# Patient Record
Sex: FEMALE | Race: WHITE | NOT HISPANIC OR LATINO | Employment: FULL TIME | URBAN - METROPOLITAN AREA
[De-identification: names, ages, dates, MRNs, and addresses within clinical notes are randomized per-mention and may not be internally consistent; named-entity substitution may affect disease eponyms.]

---

## 2017-01-03 ENCOUNTER — APPOINTMENT (OUTPATIENT)
Dept: PHYSICAL THERAPY | Facility: MEDICAL CENTER | Age: 57
End: 2017-01-03
Payer: COMMERCIAL

## 2017-01-03 PROCEDURE — 97140 MANUAL THERAPY 1/> REGIONS: CPT

## 2017-01-03 PROCEDURE — 97110 THERAPEUTIC EXERCISES: CPT

## 2017-01-05 ENCOUNTER — APPOINTMENT (OUTPATIENT)
Dept: PHYSICAL THERAPY | Facility: MEDICAL CENTER | Age: 57
End: 2017-01-05
Payer: COMMERCIAL

## 2017-01-05 PROCEDURE — 97110 THERAPEUTIC EXERCISES: CPT

## 2017-01-05 PROCEDURE — 97140 MANUAL THERAPY 1/> REGIONS: CPT

## 2017-01-09 ENCOUNTER — APPOINTMENT (OUTPATIENT)
Dept: PHYSICAL THERAPY | Facility: MEDICAL CENTER | Age: 57
End: 2017-01-09
Payer: COMMERCIAL

## 2017-01-09 PROCEDURE — 97110 THERAPEUTIC EXERCISES: CPT

## 2017-01-09 PROCEDURE — 97140 MANUAL THERAPY 1/> REGIONS: CPT

## 2017-01-11 ENCOUNTER — APPOINTMENT (OUTPATIENT)
Dept: PHYSICAL THERAPY | Facility: MEDICAL CENTER | Age: 57
End: 2017-01-11
Payer: COMMERCIAL

## 2017-01-11 ENCOUNTER — GENERIC CONVERSION - ENCOUNTER (OUTPATIENT)
Dept: OTHER | Facility: OTHER | Age: 57
End: 2017-01-11

## 2017-01-11 PROCEDURE — 97140 MANUAL THERAPY 1/> REGIONS: CPT

## 2017-01-11 PROCEDURE — 97110 THERAPEUTIC EXERCISES: CPT

## 2017-01-13 ENCOUNTER — ALLSCRIPTS OFFICE VISIT (OUTPATIENT)
Dept: OTHER | Facility: OTHER | Age: 57
End: 2017-01-13

## 2017-01-16 ENCOUNTER — APPOINTMENT (OUTPATIENT)
Dept: PHYSICAL THERAPY | Facility: MEDICAL CENTER | Age: 57
End: 2017-01-16
Payer: COMMERCIAL

## 2017-01-16 PROCEDURE — 97140 MANUAL THERAPY 1/> REGIONS: CPT

## 2017-01-16 PROCEDURE — 97110 THERAPEUTIC EXERCISES: CPT

## 2017-01-16 PROCEDURE — 97010 HOT OR COLD PACKS THERAPY: CPT

## 2017-01-20 ENCOUNTER — APPOINTMENT (OUTPATIENT)
Dept: PHYSICAL THERAPY | Facility: MEDICAL CENTER | Age: 57
End: 2017-01-20
Payer: COMMERCIAL

## 2017-01-20 PROCEDURE — 97140 MANUAL THERAPY 1/> REGIONS: CPT

## 2017-01-20 PROCEDURE — 97110 THERAPEUTIC EXERCISES: CPT

## 2017-01-23 ENCOUNTER — APPOINTMENT (OUTPATIENT)
Dept: PHYSICAL THERAPY | Facility: MEDICAL CENTER | Age: 57
End: 2017-01-23
Payer: COMMERCIAL

## 2017-01-23 PROCEDURE — 97140 MANUAL THERAPY 1/> REGIONS: CPT

## 2017-01-23 PROCEDURE — 97110 THERAPEUTIC EXERCISES: CPT

## 2017-01-26 ENCOUNTER — APPOINTMENT (OUTPATIENT)
Dept: PHYSICAL THERAPY | Facility: MEDICAL CENTER | Age: 57
End: 2017-01-26
Payer: COMMERCIAL

## 2017-01-26 PROCEDURE — 97140 MANUAL THERAPY 1/> REGIONS: CPT

## 2017-01-26 PROCEDURE — 97110 THERAPEUTIC EXERCISES: CPT

## 2017-01-30 ENCOUNTER — APPOINTMENT (OUTPATIENT)
Dept: PHYSICAL THERAPY | Facility: MEDICAL CENTER | Age: 57
End: 2017-01-30
Payer: COMMERCIAL

## 2017-01-30 PROCEDURE — 97110 THERAPEUTIC EXERCISES: CPT

## 2017-02-06 ENCOUNTER — APPOINTMENT (OUTPATIENT)
Dept: PHYSICAL THERAPY | Facility: MEDICAL CENTER | Age: 57
End: 2017-02-06
Payer: COMMERCIAL

## 2017-02-06 PROCEDURE — 97110 THERAPEUTIC EXERCISES: CPT

## 2017-02-06 PROCEDURE — 97164 PT RE-EVAL EST PLAN CARE: CPT

## 2017-02-06 PROCEDURE — 97140 MANUAL THERAPY 1/> REGIONS: CPT

## 2017-02-07 ENCOUNTER — GENERIC CONVERSION - ENCOUNTER (OUTPATIENT)
Dept: OTHER | Facility: OTHER | Age: 57
End: 2017-02-07

## 2017-02-08 ENCOUNTER — ALLSCRIPTS OFFICE VISIT (OUTPATIENT)
Dept: OTHER | Facility: OTHER | Age: 57
End: 2017-02-08

## 2017-02-08 ENCOUNTER — APPOINTMENT (OUTPATIENT)
Dept: PHYSICAL THERAPY | Facility: MEDICAL CENTER | Age: 57
End: 2017-02-08
Payer: COMMERCIAL

## 2017-02-24 ENCOUNTER — ALLSCRIPTS OFFICE VISIT (OUTPATIENT)
Dept: OTHER | Facility: OTHER | Age: 57
End: 2017-02-24

## 2017-02-26 DIAGNOSIS — Z12.31 ENCOUNTER FOR SCREENING MAMMOGRAM FOR MALIGNANT NEOPLASM OF BREAST: ICD-10-CM

## 2017-03-09 ENCOUNTER — HOSPITAL ENCOUNTER (OUTPATIENT)
Dept: MAMMOGRAPHY | Facility: MEDICAL CENTER | Age: 57
Discharge: HOME | End: 2017-03-09
Payer: COMMERCIAL

## 2017-03-09 DIAGNOSIS — Z12.31 ENCOUNTER FOR SCREENING MAMMOGRAM FOR MALIGNANT NEOPLASM OF BREAST: ICD-10-CM

## 2017-03-09 PROCEDURE — G0202 SCR MAMMO BI INCL CAD: HCPCS

## 2017-03-09 PROCEDURE — 77063 BREAST TOMOSYNTHESIS BI: CPT

## 2017-04-24 ENCOUNTER — ALLSCRIPTS OFFICE VISIT (OUTPATIENT)
Dept: OTHER | Facility: OTHER | Age: 57
End: 2017-04-24

## 2017-04-24 ENCOUNTER — LAB REQUISITION (OUTPATIENT)
Dept: LAB | Facility: HOSPITAL | Age: 57
End: 2017-04-24
Payer: COMMERCIAL

## 2017-04-24 DIAGNOSIS — Z12.31 ENCOUNTER FOR SCREENING MAMMOGRAM FOR MALIGNANT NEOPLASM OF BREAST: ICD-10-CM

## 2017-04-24 DIAGNOSIS — Z12.4 ENCOUNTER FOR SCREENING FOR MALIGNANT NEOPLASM OF CERVIX: ICD-10-CM

## 2017-04-24 DIAGNOSIS — R10.2 PELVIC AND PERINEAL PAIN: ICD-10-CM

## 2017-04-24 PROCEDURE — G0145 SCR C/V CYTO,THINLAYER,RESCR: HCPCS | Performed by: OBSTETRICS & GYNECOLOGY

## 2017-04-27 LAB
LAB AP GYN PRIMARY INTERPRETATION: NORMAL
Lab: NORMAL

## 2017-05-01 ENCOUNTER — HOSPITAL ENCOUNTER (OUTPATIENT)
Dept: RADIOLOGY | Facility: HOSPITAL | Age: 57
Discharge: HOME | End: 2017-05-01
Attending: OBSTETRICS & GYNECOLOGY
Payer: COMMERCIAL

## 2017-05-01 DIAGNOSIS — R10.2 PELVIC AND PERINEAL PAIN: ICD-10-CM

## 2017-05-01 PROCEDURE — 76830 TRANSVAGINAL US NON-OB: CPT

## 2017-05-01 PROCEDURE — 76856 US EXAM PELVIC COMPLETE: CPT

## 2017-05-11 ENCOUNTER — ALLSCRIPTS OFFICE VISIT (OUTPATIENT)
Dept: OTHER | Facility: OTHER | Age: 57
End: 2017-05-11

## 2017-06-01 ENCOUNTER — ALLSCRIPTS OFFICE VISIT (OUTPATIENT)
Dept: OTHER | Facility: OTHER | Age: 57
End: 2017-06-01

## 2017-06-20 ENCOUNTER — APPOINTMENT (OUTPATIENT)
Dept: LAB | Facility: MEDICAL CENTER | Age: 57
End: 2017-06-20
Payer: COMMERCIAL

## 2017-06-20 ENCOUNTER — TRANSCRIBE ORDERS (OUTPATIENT)
Dept: ADMINISTRATIVE | Facility: HOSPITAL | Age: 57
End: 2017-06-20

## 2017-06-20 DIAGNOSIS — E78.5 HYPERLIPIDEMIA: ICD-10-CM

## 2017-06-20 DIAGNOSIS — I10 ESSENTIAL (PRIMARY) HYPERTENSION: ICD-10-CM

## 2017-06-20 DIAGNOSIS — E55.9 VITAMIN D DEFICIENCY: ICD-10-CM

## 2017-06-20 DIAGNOSIS — R73.09 OTHER ABNORMAL GLUCOSE: ICD-10-CM

## 2017-06-20 LAB
25(OH)D3 SERPL-MCNC: 45 NG/ML (ref 30–100)
ALBUMIN SERPL BCP-MCNC: 3.7 G/DL (ref 3.5–5)
ALP SERPL-CCNC: 87 U/L (ref 46–116)
ALT SERPL W P-5'-P-CCNC: 23 U/L (ref 12–78)
ANION GAP SERPL CALCULATED.3IONS-SCNC: 5 MMOL/L (ref 4–13)
AST SERPL W P-5'-P-CCNC: 14 U/L (ref 5–45)
BILIRUB SERPL-MCNC: 0.47 MG/DL (ref 0.2–1)
BUN SERPL-MCNC: 20 MG/DL (ref 5–25)
CALCIUM SERPL-MCNC: 8.8 MG/DL (ref 8.3–10.1)
CHLORIDE SERPL-SCNC: 106 MMOL/L (ref 100–108)
CHOLEST SERPL-MCNC: 163 MG/DL (ref 50–200)
CO2 SERPL-SCNC: 28 MMOL/L (ref 21–32)
CREAT SERPL-MCNC: 0.97 MG/DL (ref 0.6–1.3)
EST. AVERAGE GLUCOSE BLD GHB EST-MCNC: 117 MG/DL
GFR SERPL CREATININE-BSD FRML MDRD: 59.6 ML/MIN/1.73SQ M
GLUCOSE P FAST SERPL-MCNC: 96 MG/DL (ref 65–99)
HBA1C MFR BLD: 5.7 % (ref 4.2–6.3)
HDLC SERPL-MCNC: 43 MG/DL (ref 40–60)
LDLC SERPL CALC-MCNC: 105 MG/DL (ref 0–100)
POTASSIUM SERPL-SCNC: 3.9 MMOL/L (ref 3.5–5.3)
PROT SERPL-MCNC: 7.3 G/DL (ref 6.4–8.2)
SODIUM SERPL-SCNC: 139 MMOL/L (ref 136–145)
TRIGL SERPL-MCNC: 73 MG/DL

## 2017-06-20 PROCEDURE — 80061 LIPID PANEL: CPT

## 2017-06-20 PROCEDURE — 83036 HEMOGLOBIN GLYCOSYLATED A1C: CPT

## 2017-06-20 PROCEDURE — 36415 COLL VENOUS BLD VENIPUNCTURE: CPT

## 2017-06-20 PROCEDURE — 82306 VITAMIN D 25 HYDROXY: CPT

## 2017-06-20 PROCEDURE — 80053 COMPREHEN METABOLIC PANEL: CPT

## 2017-06-29 ENCOUNTER — ALLSCRIPTS OFFICE VISIT (OUTPATIENT)
Dept: OTHER | Facility: OTHER | Age: 57
End: 2017-06-29

## 2017-07-25 ENCOUNTER — APPOINTMENT (OUTPATIENT)
Dept: URGENT CARE | Facility: MEDICAL CENTER | Age: 57
End: 2017-07-25
Payer: COMMERCIAL

## 2017-07-25 ENCOUNTER — APPOINTMENT (OUTPATIENT)
Dept: RADIOLOGY | Facility: MEDICAL CENTER | Age: 57
End: 2017-07-25
Payer: COMMERCIAL

## 2017-07-25 ENCOUNTER — TRANSCRIBE ORDERS (OUTPATIENT)
Dept: URGENT CARE | Facility: MEDICAL CENTER | Age: 57
End: 2017-07-25

## 2017-07-25 DIAGNOSIS — S69.92XA HAND INJURY, LEFT, INITIAL ENCOUNTER: Primary | ICD-10-CM

## 2017-07-25 DIAGNOSIS — S69.92XA HAND INJURY, LEFT, INITIAL ENCOUNTER: ICD-10-CM

## 2017-07-25 PROCEDURE — S9088 SERVICES PROVIDED IN URGENT: HCPCS

## 2017-07-25 PROCEDURE — 99213 OFFICE O/P EST LOW 20 MIN: CPT

## 2017-07-25 PROCEDURE — 73130 X-RAY EXAM OF HAND: CPT

## 2017-08-14 ENCOUNTER — ALLSCRIPTS OFFICE VISIT (OUTPATIENT)
Dept: OTHER | Facility: OTHER | Age: 57
End: 2017-08-14

## 2017-11-13 ENCOUNTER — ALLSCRIPTS OFFICE VISIT (OUTPATIENT)
Dept: OTHER | Facility: OTHER | Age: 57
End: 2017-11-13

## 2017-12-21 ENCOUNTER — APPOINTMENT (OUTPATIENT)
Dept: LAB | Facility: MEDICAL CENTER | Age: 57
End: 2017-12-21
Payer: COMMERCIAL

## 2017-12-21 ENCOUNTER — TRANSCRIBE ORDERS (OUTPATIENT)
Dept: ADMINISTRATIVE | Facility: HOSPITAL | Age: 57
End: 2017-12-21

## 2017-12-21 DIAGNOSIS — R73.09 OTHER ABNORMAL GLUCOSE: Primary | ICD-10-CM

## 2017-12-21 DIAGNOSIS — E78.5 HYPERLIPIDEMIA, UNSPECIFIED HYPERLIPIDEMIA TYPE: ICD-10-CM

## 2017-12-21 DIAGNOSIS — I10 HYPERTENSION, UNSPECIFIED TYPE: ICD-10-CM

## 2017-12-21 DIAGNOSIS — R00.1 BRADYCARDIA: ICD-10-CM

## 2017-12-21 DIAGNOSIS — R73.09 OTHER ABNORMAL GLUCOSE: ICD-10-CM

## 2017-12-21 LAB
ALBUMIN SERPL BCP-MCNC: 3.6 G/DL (ref 3.5–5)
ALP SERPL-CCNC: 82 U/L (ref 46–116)
ALT SERPL W P-5'-P-CCNC: 18 U/L (ref 12–78)
ANION GAP SERPL CALCULATED.3IONS-SCNC: 4 MMOL/L (ref 4–13)
AST SERPL W P-5'-P-CCNC: 16 U/L (ref 5–45)
BASOPHILS # BLD AUTO: 0.07 THOUSANDS/ΜL (ref 0–0.1)
BASOPHILS NFR BLD AUTO: 1 % (ref 0–1)
BILIRUB SERPL-MCNC: 0.39 MG/DL (ref 0.2–1)
BUN SERPL-MCNC: 25 MG/DL (ref 5–25)
CALCIUM SERPL-MCNC: 8.8 MG/DL (ref 8.3–10.1)
CHLORIDE SERPL-SCNC: 108 MMOL/L (ref 100–108)
CHOLEST SERPL-MCNC: 147 MG/DL (ref 50–200)
CO2 SERPL-SCNC: 29 MMOL/L (ref 21–32)
CREAT SERPL-MCNC: 0.99 MG/DL (ref 0.6–1.3)
EOSINOPHIL # BLD AUTO: 0.15 THOUSAND/ΜL (ref 0–0.61)
EOSINOPHIL NFR BLD AUTO: 3 % (ref 0–6)
ERYTHROCYTE [DISTWIDTH] IN BLOOD BY AUTOMATED COUNT: 13.2 % (ref 11.6–15.1)
EST. AVERAGE GLUCOSE BLD GHB EST-MCNC: 111 MG/DL
GFR SERPL CREATININE-BSD FRML MDRD: 64 ML/MIN/1.73SQ M
GLUCOSE P FAST SERPL-MCNC: 83 MG/DL (ref 65–99)
HBA1C MFR BLD: 5.5 % (ref 4.2–6.3)
HCT VFR BLD AUTO: 39.5 % (ref 34.8–46.1)
HDLC SERPL-MCNC: 49 MG/DL (ref 40–60)
HGB BLD-MCNC: 12.9 G/DL (ref 11.5–15.4)
LDLC SERPL CALC-MCNC: 89 MG/DL (ref 0–100)
LYMPHOCYTES # BLD AUTO: 1.65 THOUSANDS/ΜL (ref 0.6–4.47)
LYMPHOCYTES NFR BLD AUTO: 29 % (ref 14–44)
MCH RBC QN AUTO: 30 PG (ref 26.8–34.3)
MCHC RBC AUTO-ENTMCNC: 32.7 G/DL (ref 31.4–37.4)
MCV RBC AUTO: 92 FL (ref 82–98)
MONOCYTES # BLD AUTO: 0.38 THOUSAND/ΜL (ref 0.17–1.22)
MONOCYTES NFR BLD AUTO: 7 % (ref 4–12)
NEUTROPHILS # BLD AUTO: 3.45 THOUSANDS/ΜL (ref 1.85–7.62)
NEUTS SEG NFR BLD AUTO: 60 % (ref 43–75)
NRBC BLD AUTO-RTO: 0 /100 WBCS
PLATELET # BLD AUTO: 309 THOUSANDS/UL (ref 149–390)
PMV BLD AUTO: 11 FL (ref 8.9–12.7)
POTASSIUM SERPL-SCNC: 4.3 MMOL/L (ref 3.5–5.3)
PROT SERPL-MCNC: 7.4 G/DL (ref 6.4–8.2)
RBC # BLD AUTO: 4.3 MILLION/UL (ref 3.81–5.12)
SODIUM SERPL-SCNC: 141 MMOL/L (ref 136–145)
TRIGL SERPL-MCNC: 46 MG/DL
TSH SERPL DL<=0.05 MIU/L-ACNC: 2.91 UIU/ML (ref 0.36–3.74)
WBC # BLD AUTO: 5.71 THOUSAND/UL (ref 4.31–10.16)

## 2017-12-21 PROCEDURE — 80053 COMPREHEN METABOLIC PANEL: CPT

## 2017-12-21 PROCEDURE — 84443 ASSAY THYROID STIM HORMONE: CPT

## 2017-12-21 PROCEDURE — 83036 HEMOGLOBIN GLYCOSYLATED A1C: CPT

## 2017-12-21 PROCEDURE — 85025 COMPLETE CBC W/AUTO DIFF WBC: CPT

## 2017-12-21 PROCEDURE — 80061 LIPID PANEL: CPT

## 2017-12-21 PROCEDURE — 36415 COLL VENOUS BLD VENIPUNCTURE: CPT

## 2018-01-04 ENCOUNTER — GENERIC CONVERSION - ENCOUNTER (OUTPATIENT)
Dept: OTHER | Facility: OTHER | Age: 58
End: 2018-01-04

## 2018-01-10 NOTE — PROGRESS NOTES
Assessment   1  Strain of tendon of right rotator cuff (840 4) (S46 011A)  2  Lateral epicondylitis of right elbow (726 32) (M77 11)1      1 Amended By: Karissa Boyle; Feb 02 2016 9:01 AM EST    Plan  Strain of tendon of right rotator cuff    · *1 - [facility] Physical Therapy  Consult  Status: Hold For - Scheduling   Requested for: 48THA7529  () Care Summary provided  : Yes    Discussion/Summary    Right shoulder rotator cuff strain, right elbow lateral epicondylitis  Referred to physical therapy for home exercise program   Continue meloxicam   Follow-up 5-6 weeks  Consider MRI if symptoms persist       Chief Complaint   1  Shoulder Pain    History of Present Illness  29-year-old right-hand-dominant female with right shoulder and elbow pain since 1/23/16 when she was shoveling snow  She initially had moderate pain in her right lateral elbow and has been icing that  History is notable for ECRB tendon debridement for lateral epicondylitis with Dr Reynaldo Olivera in 2007  She subsequently developed severe pain in her right shoulder with weakness and inability to raise her arm  The weakness has improved considerably but she continues to have pain localizing to the posterior and lateral aspect  No numbness or tingling reported  She takes meloxicam 15 mg daily  Symptoms are improving overall  The past medical history, past surgical history, medications, allergies, social history, and family history were reviewed and updated today  Review of Systems:  As stated in the history  The remaining systems were reviewed and are documented on the Patient Information and Medical History form in the chart  Active Problems   1  Abnormal blood sugar (790 29) (R73 09)  2  Allergic rhinitis (477 9) (J30 9)  3  Benign essential hypertension (401 1) (I10)  4  Hyperlipidemia (272 4) (E78 5)  5  Hypertension (401 9) (I10)  6  Insomnia (780 52) (G47 00)  7  Obesity (278 00) (E66 9)  8   Pain in both feet (729 5) (M79 671,M79 672)  9  Sinus bradycardia (427 89) (R00 1)  10  Vitamin D deficiency (268 9) (E55 9)    Past Medical History    · History of Abdominal pain (789 00) (R10 9)   · History of Abnormal blood chemistry (790 6) (R79 9)   · History of Acute sinusitis (461 9) (J01 90)   · History of Acute upper respiratory infection (465 9) (J06 9)   · History of Allergic Reaction (995 3)   · History of Allergic rhinitis (477 9) (J30 9)   · History of Arthralgia Of Left Shoulder Region (719 41)   · History of Diarrhea (787 91) (R19 7)   · History of Functional murmur (R01 0)   · 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)   · History of syncope (V15 89) (Z87 898)   · History of Lumbago (724 2) (M54 5)   · History of Obstruction of Eustachian tube, unspecified laterality (381 60) (H68 109)   · History of Otalgia, unspecified laterality (388 70) (H92 09)    Surgical History    · History of Appendectomy   · History of Cardiac Cath Procedure Summary   · History of Hysterectomy   · History of Pacemaker Placement    Family History    · Family history of Breast Cancer (V16 3)    · Family history of Cancer    · Family history of Allergic Bronchitis   · Family history of Diabetes Mellitus (V18 0)   · Family history of Epilepsy   · Family history of Heart Disease (V17 49)   · Family history of Hypertension (V17 49)   · Family history of Skin Cancer (V16 8)    Social History    · Denied: History of Alcohol Use (History)   · Living Independently With Spouse   · Never A Smoker   · Occupation:    Current Meds  1  Aspirin EC 81 MG Oral Tablet Delayed Release; Take 1 tablet daily; Last Rx:15Jun2015   Ordered  2  Azelastine HCl - 0 1 % Nasal Solution; USE 1 SPRAY IN EACH NOSTRIL TWICE DAILY; Therapy: 23Apr2014 to (Last Rx:21Apr2015)  Requested for: 21Apr2015 Ordered  3  EPINEPHrine 0 3 MG/0 3ML Injection Solution Auto-injector; use as directed;    Therapy: 52Ifv3031 to (Last Andrew Keen)  Requested for: 21Dec2015 Ordered  4  Fish Oil 1200 MG Oral Capsule; Take 1 capsule twice daily; Last Rx:15Jun2015 Ordered  5  Fluticasone Propionate 50 MCG/ACT Nasal Suspension; SUSP NA; Therapy: 62NNA0443 to (Evaluate:09Jun2016)  Requested for: 01NFS7752; Last   Rx:15Jun2015 Ordered  6  Meloxicam 7 5 MG Oral Tablet; TAKE 1 TABLET Twice daily PRN; Therapy: 09BEN0532 to (Evaluate:20Mar2016)  Requested for: 12Mac6632; Last   Rx:21Dec2015 Ordered  7  Red Yeast Rice 600 MG Oral Capsule; TAKE 4 CAPSULE Daily; Last Rx:15Jun2015   Ordered  8  Vitamin D2 2000 UNIT Oral Tablet; Take 1 tablet daily; Therapy: 11WWE6357 to Recorded    Allergies   1  Shellfish-derived Products  2  Darvon CAPS  3  Ultram TABS   4  Shellfish    Vitals   Recorded: 88GBP2966 08:38AM   Heart Rate 60   Systolic 239   Diastolic 84   Height 5 ft 7 in   Weight 248 lb    BMI Calculated 38 84   BSA Calculated 2 21     Physical Exam  Right shoulder: [No gross deformity]  [Nontender to palpation]  Range of motion is [symmetric bilaterally]  Neer impingement sign is positive  Fernandez impingement sign is positive  Empty can test is [negative]  Speed's test is [negative]  Emanuel's test is [negative]  Cross body adduction test is [negative]  [5]/5 strength forward flexion  Right elbow: [No] soft tissue swelling  Tenderness to palpation common extensor tendon near the lateral epicondyle  Well-healed surgical scar in this region without erythema  Range of motion is [symmetric bilaterally]  [Stable] with varus stress  [Stable] with valgus stress  Cardiovascular - Pulses: Normal    Skin - Skin and subcutaneous tissue: Normal    Psychiatric - Mood and affect: Normal       Future Appointments    Date/Time Provider Specialty Site   05/06/2016 09:30 AM Cardiology, [address]   02/08/2016 02:00 PM Cardiology, Device Remote  [facility]   05/11/2016 09:00 AM KASHIF Flores   Cardiology  CARDIOLOGY  [place]   06/23/2016 07:45 AM Marcos Bass DO Family Medicine [address]   04/18/2016 08:00 AM Jaswant Fairchild DO Obstetrics/Gynecology [facility]     Signatures   Electronically signed by : KASHIF Cee ; Feb 2 2016  9:01AM EST                       (Author)

## 2018-01-13 VITALS
HEIGHT: 67 IN | BODY MASS INDEX: 37.67 KG/M2 | DIASTOLIC BLOOD PRESSURE: 70 MMHG | SYSTOLIC BLOOD PRESSURE: 117 MMHG | WEIGHT: 240 LBS

## 2018-01-13 VITALS
HEIGHT: 67 IN | DIASTOLIC BLOOD PRESSURE: 78 MMHG | SYSTOLIC BLOOD PRESSURE: 126 MMHG | HEART RATE: 60 BPM | WEIGHT: 231.44 LBS | BODY MASS INDEX: 36.33 KG/M2

## 2018-01-13 VITALS
BODY MASS INDEX: 40.85 KG/M2 | DIASTOLIC BLOOD PRESSURE: 79 MMHG | WEIGHT: 260.25 LBS | SYSTOLIC BLOOD PRESSURE: 139 MMHG | HEART RATE: 66 BPM | HEIGHT: 67 IN

## 2018-01-13 NOTE — RESULT NOTES
Verified Results  * XR SHOULDER 2+ VIEW LEFT 31Bts7515 11:19AM Chandler Vu Order Number: [id number]     Test Name Result Flag Reference   XR SHOULDER 2+ VW LEFT (Report)     LEFT SHOULDER     INDICATION: Left shoulder pain  Lateral pain     COMPARISON: 12/22/2008     VIEWS: 3; 3 images     FINDINGS:   Left chest wall cardiac device  There is no acute fracture or dislocation  Small calcific density adjacent to the greater tuberosity may represent calcific tendinitis  No lytic or blastic lesions are seen  Soft tissues are unremarkable  IMPRESSION:     No acute osseous abnormality  Small calcific density adjacent to the greater tuberosity may represent calcific tendinitis         Workstation performed: [id number]     Signed by:   Wen Hedrick MD   7/12/16

## 2018-01-15 VITALS
WEIGHT: 246.25 LBS | HEIGHT: 67 IN | SYSTOLIC BLOOD PRESSURE: 115 MMHG | DIASTOLIC BLOOD PRESSURE: 76 MMHG | BODY MASS INDEX: 38.65 KG/M2 | HEART RATE: 60 BPM

## 2018-01-15 VITALS
HEIGHT: 67 IN | DIASTOLIC BLOOD PRESSURE: 82 MMHG | SYSTOLIC BLOOD PRESSURE: 128 MMHG | TEMPERATURE: 98.7 F | OXYGEN SATURATION: 98 % | HEART RATE: 55 BPM | WEIGHT: 226.44 LBS | RESPIRATION RATE: 16 BRPM | BODY MASS INDEX: 35.54 KG/M2

## 2018-01-24 VITALS
BODY MASS INDEX: 33.13 KG/M2 | TEMPERATURE: 97.6 F | OXYGEN SATURATION: 99 % | RESPIRATION RATE: 17 BRPM | DIASTOLIC BLOOD PRESSURE: 92 MMHG | HEART RATE: 60 BPM | WEIGHT: 211.56 LBS | SYSTOLIC BLOOD PRESSURE: 142 MMHG

## 2018-01-24 VITALS — SYSTOLIC BLOOD PRESSURE: 124 MMHG | DIASTOLIC BLOOD PRESSURE: 82 MMHG

## 2018-02-14 ENCOUNTER — CLINICAL SUPPORT (OUTPATIENT)
Dept: CARDIOLOGY CLINIC | Facility: CLINIC | Age: 58
End: 2018-02-14
Payer: COMMERCIAL

## 2018-02-14 DIAGNOSIS — Z95.0 CARDIAC PACEMAKER IN SITU: ICD-10-CM

## 2018-02-14 DIAGNOSIS — R55 SYNCOPE AND COLLAPSE: Primary | ICD-10-CM

## 2018-02-14 PROCEDURE — 93296 REM INTERROG EVL PM/IDS: CPT | Performed by: INTERNAL MEDICINE

## 2018-02-14 PROCEDURE — 93294 REM INTERROG EVL PM/LDLS PM: CPT | Performed by: INTERNAL MEDICINE

## 2018-02-15 NOTE — PROGRESS NOTES
CARELINK TRANSMISSION: BATTERY VOLTAGE ADEQUATE (31 MOS)  AP-34%, -0 8%  ALL AVAILABLE LEAD PARAMETERS WITHIN NORMAL LIMITS  NO SIGNIFICANT HIGH RATE EPISODES  155 RATE DROP RESPONSE EPISODES  NORMAL DEVICE FUNCTION   GV

## 2018-02-16 ENCOUNTER — EVALUATION (OUTPATIENT)
Dept: PHYSICAL THERAPY | Facility: MEDICAL CENTER | Age: 58
End: 2018-02-16
Payer: COMMERCIAL

## 2018-02-16 DIAGNOSIS — G56.22 CUBITAL TUNNEL SYNDROME ON LEFT: Primary | ICD-10-CM

## 2018-02-16 PROCEDURE — G8990 OTHER PT/OT CURRENT STATUS: HCPCS | Performed by: PHYSICAL THERAPIST

## 2018-02-16 PROCEDURE — 97161 PT EVAL LOW COMPLEX 20 MIN: CPT | Performed by: PHYSICAL THERAPIST

## 2018-02-16 PROCEDURE — G8991 OTHER PT/OT GOAL STATUS: HCPCS | Performed by: PHYSICAL THERAPIST

## 2018-02-16 NOTE — PROGRESS NOTES
PT Evaluation     Today's date: 2018  Patient name: Sheyla Reeder  : 1960  MRN: [id number]  Referring provider: Rahat Sumner PT  Dx:   Encounter Diagnosis   Name Primary?  Cubital tunnel syndrome on left Yes                  Assessment  Impairments: abnormal muscle firing and abnormal or restricted ROM    Assessment details: Sheyla Reeder is a 56 y o  female presents with Cubital tunnel syndrome on left  (primary encounter diagnosis)  Sheyla Reeder presents with the above listed impairments resulting in daily functional deficits and negative impact to quality of life  Skilled PT services appropriate to facilitate return to prior level of function with transition to home exercise program when appropriate  Understanding of Dx/Px/POC: good   Prognosis: good    Goals    - Increase streng th to 5/5 throughout scapular region  Impairment Goals  Resolve numbness in hand    Functional Goals  - Return to Prior Level of Function  - Return to patient specific functional goals identified at IE  No numbness in morning   - Patient will be independent with HEP    Plan  Patient would benefit from: skilled PT  Referral necessary: No  Other planned modality interventions: Modalities PRN  Planned therapy interventions: home exercise program, manual therapy, patient education, functional ROM exercises, strengthening, stretching and joint mobilization  Frequency: 2x week  Duration in weeks: 6  Treatment plan discussed with: patient        Subjective Evaluation    History of Present Illness  Date of onset: 2018  Mechanism of injury: Sheyla Reeder is a 56 y o female presenting to therapy with complaints of left hand numbness for a few days  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 who believes it is cubital tunnel  She notes intermittent events of numbness over past few months however since Wednesday it has been constant    She has attempted OTC brace however is not able to wear due to discomfort  Numbness is 5th digit and lateral side of 4th digit  She is present for DA evaluation  Quality of life: excellent    Pain  Current pain ratin  At best pain ratin  At worst pain ratin  Location: Numbness, not pain   Progression: worsening      Diagnostic Tests  No diagnostic tests performed  Treatments  No previous or current treatments  Current treatment: massage  Patient Goals  Patient goal: Resolve numbness        Objective     Tests     Additional Tests Details  + tinnel to cubital tunnel    (-) guyons canal compression    ULTT (-) for all neural biases     General Comments     Cervical/Thoracic Comments  Cervical spine testing unable to reproduce symptoms          Precautions: Pacemaker    Daily Treatment Diary     Manual                                                                                   Exercise Diary                                                                                                                                                                                                                                                                                      Modalities

## 2018-02-20 ENCOUNTER — OFFICE VISIT (OUTPATIENT)
Dept: PHYSICAL THERAPY | Facility: MEDICAL CENTER | Age: 58
End: 2018-02-20
Payer: COMMERCIAL

## 2018-02-20 DIAGNOSIS — G56.22 CUBITAL TUNNEL SYNDROME ON LEFT: Primary | ICD-10-CM

## 2018-02-20 PROCEDURE — 97110 THERAPEUTIC EXERCISES: CPT | Performed by: PHYSICAL THERAPIST

## 2018-02-21 NOTE — PROGRESS NOTES
Daily Note     Today's date: 2018  Patient name: Pedro Lucio  : 1960  MRN: [id number]  Referring provider: Tony Lobo, [place]  Dx:   Encounter Diagnosis     ICD-10-CM    1  Cubital tunnel syndrome on left G56 22                   Subjective: Patient reports no change from IE       Objective: See treatment diary below    Precautions: Pacemaker    Daily Treatment Diary     Manual             Radial-ulnar gliding  AF           Humero-ulnar distraction  AF           Cervical side glides  AF           Carpal mobs  AF                            Exercise Diary                                                                                                                                                                                                                                                                                      Modalities              MHP pre manuals  10'                                         Assessment: Tolerated treatment well  Patient would benefit from continued PT  Patient to have brace made to manage overnight  Plan: Continue per plan of care

## 2018-03-07 NOTE — PROGRESS NOTES
"  Discussion/Summary  Normal device function     Not clearly afib  atrial high rate episodes brief  Results/Data  Cardiac Device Remote [id number] 10:10PM Terrie Self     Test Name Result Flag Reference   MISCELLANEOUS COMMENT (Report)     CARELINK TRANSMISSION: BATTERY VOLTAGE ADEQUATE (3 5 YRS)  AP-16%, -0 7%  8 NEW AHR EPISODES LONGEST 12 SEC  ST W/ FARFIELD OVERSENSING ON EGM'S  HX OF SAME  WILL NEED TO REPROGRAM SENSITIVITY @ NEXT INTERRO  40 NEW RATE DROP RESPONSE EPISODES  ALL AVAILABLE LEAD PARAMETERS WITHIN NORMAL LIMITS  NORMAL DEVICE FUNCTION  GV   Cardiac Electrophysiology Report      [id number]  pdf   DEVICE TYPE Pacemaker       Cardiac Electrophysiology Report [id number] 10:10PM Terrie Self     Test Name Result Flag Reference   Cardiac Electrophysiology Report      [id number]  pdf     Signatures   Electronically signed by : Curly Rust RN; Feb 9 2017  4:14PM EST                       (Author)    Electronically signed by : Sarbjit Huntley DO; Feb 19 2017  6:00PM EST                       (Author)    "

## 2018-03-07 NOTE — PROGRESS NOTES
"  Discussion/Summary  Normal device function     Rate drop response noted  Results/Data  Cardiac Device Remote [id number] 02:48AM Lorean Fought     Test Name Result Flag Reference   MISCELLANEOUS COMMENT      CARELINK TRANSMISSION: BATTERY VOLTAGE ADEQUATE (~232 MOS)  AP-34%, -1%  ALL AVAILABLE LEAD PARAMETERS APPEAR WITHIN NORMAL LIMITS & STABLE  NO SIGNIFICANT HIGH RATE EPISODES  111 RATE DROP RESPONSE EPISODES  PACEMAKER FUNCTIONING APPROPRIATELY  eb   Cardiac Electrophysiology Report      [id number]  pdf   DEVICE TYPE Pacemaker       Cardiac Electrophysiology Report [id number] 02:48AM Loreabubba Fodarryl     Test Name Result Flag Reference   Cardiac Electrophysiology Report      [id number]  pdf     Signatures   Electronically signed by : Tej Burnett, ; Nov 13 2017 12:54PM EST                       (Author)    Electronically signed by : KASHIF Fermin ; Nov 14 2017  9:51AM EST                       (Author)    "

## 2018-03-07 NOTE — PROGRESS NOTES
"  Discussion/Summary  Normal device function      Results/Data  Cardiac Device Remote [id number] 07:09PM Kira Rodriguez     Test Name Result Flag Reference   MISCELLANEOUS COMMENT      CARELINK TRANSMISSION: BATTERY VOLTAGE ADEQUATE (3 YRS)  AP-34%, -0 7%  ALL AVAILABLE LEAD PARAMETERS WITHIN NORMAL LIMITS  NO SIGNIFICANT HIGH RATE EPISODES  50 RATE DROP RESPONSE EPISODES  NORMAL DEVICE FUNCTION  GV   Cardiac Electrophysiology Report      [id number]  pdf   DEVICE TYPE Pacemaker       Cardiac Electrophysiology Report [id number] 07:09PM Kira Rodriguez     Test Name Result Flag Reference   Cardiac Electrophysiology Report      [id number]  pdf     Signatures   Electronically signed by : Sandy Mejia RN; Aug 15 2017  4:29PM EST                       (Author)    Electronically signed by : William Aguilar DO; Aug 15 2017  6:42PM EST                       (Author)    "

## 2018-03-07 NOTE — PROGRESS NOTES
"  Discussion/Summary  Normal device function     Brief episodes of AHR  no egm , only markers      atrial lead is set up bipolar, hence unlikely far field sensing   follow closely  Results/Data  Cardiac Device Remote [id number] 04:01PM Arty Cutter     Test Name Result Flag Reference   MISCELLANEOUS COMMENT (Report)     CARELINK TRANSMISSION: BATTERY VOLTAGE ADEQUATE (3 5 YRS)  AP-19%, -0 7%  14 NEW AHR EPISODES- ST W/ FARFIELD OVERSENSING ON AVAILABLE EGM'S W/ HX OF SAME  78 RATE DROP RESPONSE EPISODES SINCE 5/6/16  ALL AVAILABLE LEAD PARAMETERS WITHIN NORMAL LIMITS  NORMAL DEVICE FUNCTION  GV   Cardiac Electrophysiology Report      [id number]  pdf   DEVICE TYPE Pacemaker       Cardiac Electrophysiology Report [id number] 04:01PM Arty Cutter     Test Name Result Flag Reference   Cardiac Electrophysiology Report      [id number]  pdf     Signatures   Electronically signed by : Alexa Westbrook RN; Nov 14 2016  4:37PM EST                       (Author)    Electronically signed by : KASHIF Stevenson ; Nov 23 2016 10:48PM EST                       (Author)    "

## 2018-03-07 NOTE — PROGRESS NOTES
"  Discussion/Summary  Normal device function      Results/Data  Results   Cardiac Device Remote [id number] 09:39AM Paula Garces     Test Name Result Flag Reference   MISCELLANEOUS COMMENT (Report)     CARELINK TRANSMISSION: BATTERY VOLTAGE ADEQUATE (4 YRS); AP = 15%,  = 1%; 4 DEVICE CLASSIFIED AHR EPISODES - ALL <1 MIN DURATION - HX OF SAME; PT TAKES ASA 81 MG; MULTI RATE DROP EPISODES NOTED - HX OF SAME; ALL LEAD PARAMETERS APPEAR WITHIN NORMAL LIMITS/STABLE; NO OTHER TESTING AVAILABLE DUE TO REMOTE TRANSMISSION; NORMAL DEVICE FUNCTION  eb   Cardiac Electrophysiology Report      [id number]}  pdf   DEVICE TYPE Pacemaker       Cardiac Electrophysiology Report [id number] 09:39AM Paula Garces     Test Name Result Flag Reference   Cardiac Electrophysiology Report      [id number]  pdf     Signatures   Electronically signed by : Lisy Abernathy, ; Feb 8 2016 12:17PM EST                       (Author)    Electronically signed by : Nilo Hodge DO; Feb 14 2016  7:45AM EST                       (Author)    "

## 2018-03-07 NOTE — PROGRESS NOTES
"  Discussion/Summary  Normal device function      Results/Data  Results   Cardiac Device In Clinic [id number] 01:17PM Blossom Ege     Test Name Result Flag Reference   MISCELLANEOUS COMMENT (Report)     DEVICE INTERROGATED IN THE [place] OFFICE  BATTERY VOLTAGE ADEQUATE (4 YRS)  AP 13 5%  0 8%  ALL AVAILABLE LEAD PARAMETERS WITHIN NORMAL LIMITS  MULT AHR EPISODES UP TO 8 SEC = FAR-FIELD OVERSENSING  MULT RATE DROP EPISODES WITH HX OF SAME  NO EGRAMS AVAILABLE  PT ASYMPTOMATIC  INCREASED PVAB FROM 180-200 MS  PACEMAKER FUNCTIONING APPROPRIATELY  CP   Cardiac Electrophysiology Report      [id number] [id number] [id number]_16_1  pdf   DEVICE TYPE Pacemaker       Cardiac Electrophysiology Report [id number] 01:17PM Blossom Ege     Test Name Result Flag Reference   Cardiac Electrophysiology Report      [id number]  pdf     Signatures   Electronically signed by : Ryan Taylor, ; May  6 2016 10:07AM EST                       (Author)    Electronically signed by : Vladimir Leonard DO; May  8 2016 11:41AM EST                       (Author)    "

## 2018-03-07 NOTE — PROGRESS NOTES
"  Discussion/Summary  Normal device function      Results/Data  Cardiac Device In Clinic [id number] 06:48PM Ilichris Tomlinson     Test Name Result Flag Reference   MISCELLANEOUS COMMENT (Report)     DEVICE INTERROGATED IN THE [place] OFFICE  J5IhKNIHMD VOLTAGE ADEQUATE (3YRS)  AP-20%, -1%  ALL LEAD PARAMETERS TEST WITHIN NORMAL LIMITS WITH SM  RISE IN RV CAPTURE THRESHOLD  INCREASE MADE TO RV AMPLITUDE TO PROVIDE ADEQUATE SAFETY MARGIN  6 NEW AHR EPISODES WITH DURATION ALL <1 MIN FOR ST W/ FARFIELD OS  RA SENSING INCREASED  168 RATE DROP RESPONSE EPISODES ALSO NOTED  NORMAL DEVICE FUNCTION  eb   Cardiac Electrophysiology Report      [id number] [id number] [id number]  pdf   DEVICE TYPE Pacemaker       Cardiac Electrophysiology Report [id number] 06:48PM Trista Tomlinson     Test Name Result Flag Reference   Cardiac Electrophysiology Report      [id number]  pdf     Signatures   Electronically signed by : Glenys Jama, ; May 15 2017  9:28AM EST                       (Author)    Electronically signed by : Morenita Uriarte DO; May 17 2017 12:46PM EST                       (Author)    "

## 2018-03-07 NOTE — PROGRESS NOTES
"  Discussion/Summary  Normal device function      Results/Data  Cardiac Device Remote [id number] 12:11PM Rbeecca Alphonso     Test Name Result Flag Reference   MISCELLANEOUS COMMENT (Report)     CARELINK TRANSMISSION: BATTERY VOLTAGE ADEQUATE (3 5 YRS); AP = 18%,  = 1%; (1) DEVICE CLASSIFIED AHR EPISODES - DURATION @ 2 SECS - APPEARS TO BE FAR FIELD OVERSENSING (NOT AF); (45) RATE DROP EPISODES ALSO NOTED (NO EGRMS STORED); PT WITH HX OF SAME; ALL AVAILABLE LEAD PARAMETERS APPEAR WITHIN NORMAL LIMITS/STABLE; NORMAL DEVICE FUNCTION  eb   Cardiac Electrophysiology Report      [id number]}  pdf   DEVICE TYPE Pacemaker       Cardiac Electrophysiology Report [id number] 12:11PM Rebecca Laphonso     Test Name Result Flag Reference   Cardiac Electrophysiology Report      [id number] pdf     Signatures   Electronically signed by : Torin Padilla, ; Aug  8 2016  9:22AM EST                       (Author)    Electronically signed by : Eze Nelson DO; Aug 10 2016  1:49PM EST                       (Author)    "

## 2018-03-12 ENCOUNTER — HOSPITAL ENCOUNTER (OUTPATIENT)
Dept: MAMMOGRAPHY | Facility: MEDICAL CENTER | Age: 58
Discharge: HOME | End: 2018-03-12
Payer: COMMERCIAL

## 2018-03-12 DIAGNOSIS — Z12.31 ENCOUNTER FOR SCREENING MAMMOGRAM FOR MALIGNANT NEOPLASM OF BREAST: ICD-10-CM

## 2018-03-12 PROCEDURE — 77063 BREAST TOMOSYNTHESIS BI: CPT

## 2018-03-12 PROCEDURE — 77067 SCR MAMMO BI INCL CAD: CPT

## 2018-04-17 DIAGNOSIS — R20.2 LEFT HAND PARESTHESIA: ICD-10-CM

## 2018-04-17 DIAGNOSIS — R20.2 PARESTHESIA OF THUMB OF LEFT HAND: Primary | ICD-10-CM

## 2018-04-18 ENCOUNTER — OFFICE VISIT (OUTPATIENT)
Dept: CARDIOLOGY CLINIC | Facility: CLINIC | Age: 58
End: 2018-04-18
Payer: COMMERCIAL

## 2018-04-18 VITALS — WEIGHT: 206 LBS | HEIGHT: 67 IN | BODY MASS INDEX: 32.33 KG/M2

## 2018-04-18 DIAGNOSIS — E78.2 MIXED HYPERLIPIDEMIA: ICD-10-CM

## 2018-04-18 DIAGNOSIS — I10 HYPERTENSION, UNSPECIFIED TYPE: Primary | ICD-10-CM

## 2018-04-18 DIAGNOSIS — R55 SYNCOPE, UNSPECIFIED SYNCOPE TYPE: ICD-10-CM

## 2018-04-18 DIAGNOSIS — R00.1 SINUS BRADYCARDIA: ICD-10-CM

## 2018-04-18 PROCEDURE — 93000 ELECTROCARDIOGRAM COMPLETE: CPT | Performed by: INTERNAL MEDICINE

## 2018-04-18 PROCEDURE — 99214 OFFICE O/P EST MOD 30 MIN: CPT | Performed by: INTERNAL MEDICINE

## 2018-04-18 NOTE — PROGRESS NOTES
Cardiology Follow Up    Yajaira [facility]  1960  [id number]  [address]  [address] [address]    1  Hypertension, unspecified type  POCT ECG   2  Mixed hyperlipidemia     3  Sinus bradycardia     4  Syncope, unspecified syncope type         Interval History:  Of overall she feels well  She tolerates all activity  She has had no further syncope  She has had significant weight loss with that notices herself to be at times a little bit orthostatic  She denies chest pain orthopnea paroxysmal nocturnal dyspnea or syncope  Patient Active Problem List   Diagnosis    Hypertension    Mixed hyperlipidemia    Sinus bradycardia    Syncope     Past Medical History:   Diagnosis Date    Abnormal blood chemistry     last assessed: 6/9/2014    Allergic reaction     last assessed: 9/17/2012    Allergic rhinitis     last assessed: 6/9/2014    Arthralgia of left shoulder region     last assessed: 12/3/2013    Arthritis     Dysautonomia     Elevated blood pressure reading without diagnosis of hypertension     last assessed: 9/30/2016    Familial combined hyperlipidemia     last assessed: 6/9/2014    Functional murmur     description: soft systolic murmur - no valvular disease on 2D echo Last assessed: 4/23/2014    Hypertension     Obstruction of eustachian tube     unspecified laterality Last assessed: 3/17/2014    PONV (postoperative nausea and vomiting)     Symptomatic bradycardia     Syncope     Wears glasses      Social History     Social History    Marital status: /Civil Union     Spouse name: N/A    Number of children: 3    Years of education: N/A     Occupational History    Not on file       Social History Main Topics    Smoking status: Never Smoker    Smokeless tobacco: Not on file    Alcohol use Yes      Comment: 1-2 x per year Per allscripts - no alcohol use     Drug use: No    Sexual activity: Not on file     Other Topics Concern    Not on file     Social History Narrative    Living independently with spouse    Caffeine use       Family History   Problem Relation Age of Onset    Breast cancer Mother     Cancer Father     Allergies Family      bronchitis    Diabetes Family     Seizures Family     Heart disease Family     Hypertension Family     Skin cancer Family      Past Surgical History:   Procedure Laterality Date    APPENDECTOMY      CARDIAC CATHETERIZATION      Description: Normal EF, No obstructive coronary artery disease, systolic hypertension  Zachariah Solis done at [facility] Resolved: 1998    CARDIAC PACEMAKER PLACEMENT Left      SECTION      COLONOSCOPY      DEBRIDEMENT TENNIS ELBOW      GANGLION CYST EXCISION Left     hand    HYSTERECTOMY      KNEE ARTHROSCOPY Right     WA SHLDR ARTHROSCOP,SURG,W/ROTAT CUFF REPR Left 10/3/2016    Procedure: ARTHROSCOPY SHOULDER, ROTATOR CUFF REPAIR,SUBACROMIAL DECOMPRESSION ; MICROFRACTURE MIKAELA GLENOID HEAD;  Surgeon: Anita Stapleton MD;  Location: [facility];  Service: Orthopedics    TONSILLECTOMY      WISDOM TOOTH EXTRACTION         Current Outpatient Prescriptions:     aspirin 81 MG tablet, Take 81 mg by mouth daily, Disp: , Rfl:     azelastine (ASTELIN) 0 1 % nasal spray, 2 sprays into each nostril daily Use in each nostril as directed, Disp: , Rfl:     Cholecalciferol (VITAMIN D-3 PO), Take 4,000 Units by mouth daily, Disp: , Rfl:     EPINEPHrine (EPIPEN) 0 3 mg/0 3 mL SOAJ, Inject 0 3 mg into the shoulder, thigh, or buttocks as needed for anaphylaxis, Disp: , Rfl:     FLUTICASONE PROPIONATE, NASAL, NA, 2 sprays into each nostril daily, Disp: , Rfl:     loratadine (CLARITIN) 10 mg tablet, Take 10 mg by mouth daily, Disp: , Rfl:     Omega-3 Fatty Acids (FISH OIL PO), Take 2,000 mg by mouth 2 (two) times a day, Disp: , Rfl:     Red Yeast Rice Extract (RED YEAST RICE PO), Take 1,200 mg by mouth 2 (two) times a day, Disp: , Rfl: Allergies   Allergen Reactions    Shellfish Allergy Anaphylaxis    Shellfish-Derived Products Anaphylaxis    Shrimp Flavor Anaphylaxis    Darvon [Propoxyphene] Hives    Tramadol Hives    Ultram [Tramadol Hcl] Hives       Labs:  Appointment on 12/21/2017   Component Date Value    WBC 12/21/2017 5 71     RBC 12/21/2017 4 30     Hemoglobin 12/21/2017 12 9     Hematocrit 12/21/2017 39 5     MCV 12/21/2017 92     MCH 12/21/2017 30 0     MCHC 12/21/2017 32 7     RDW 12/21/2017 13 2     MPV 12/21/2017 11 0     Platelets 35/68/1428 309     nRBC 12/21/2017 0     Neutrophils Relative 12/21/2017 60     Lymphocytes Relative 12/21/2017 29     Monocytes Relative 12/21/2017 7     Eosinophils Relative 12/21/2017 3     Basophils Relative 12/21/2017 1     Neutrophils Absolute 12/21/2017 3 45     Lymphocytes Absolute 12/21/2017 1 65     Monocytes Absolute 12/21/2017 0 38     Eosinophils Absolute 12/21/2017 0 15     Basophils Absolute 12/21/2017 0 07     Sodium 12/21/2017 141     Potassium 12/21/2017 4 3     Chloride 12/21/2017 108     CO2 12/21/2017 29     Anion Gap 12/21/2017 4     BUN 12/21/2017 25     Creatinine 12/21/2017 0 99     Glucose, Fasting 12/21/2017 83     Calcium 12/21/2017 8 8     AST 12/21/2017 16     ALT 12/21/2017 18     Alkaline Phosphatase 12/21/2017 82     Total Protein 12/21/2017 7 4     Albumin 12/21/2017 3 6     Total Bilirubin 12/21/2017 0 39     eGFR 12/21/2017 64     Hemoglobin A1C 12/21/2017 5 5     EAG 12/21/2017 111     TSH 3RD GENERATON 12/21/2017 2 910     Cholesterol 12/21/2017 147     Triglycerides 12/21/2017 46     HDL, Direct 12/21/2017 49     LDL Calculated 12/21/2017 89      Imaging: No results found  Review of Systems:  Review of Systems   Constitutional: Negative for fatigue  HENT: Negative for hearing loss  Eyes: Negative for discharge  Respiratory: Negative for shortness of breath      Cardiovascular: Positive for leg swelling  Negative for chest pain and palpitations  Gastrointestinal: Negative for abdominal pain  Endocrine: Negative for polyuria  Genitourinary: Negative for dysuria  Musculoskeletal: Negative for back pain and myalgias  Skin: Negative for rash  Neurological: Negative for syncope  Hematological: Does not bruise/bleed easily  Psychiatric/Behavioral: Negative for confusion and sleep disturbance  Physical Exam:  Physical Exam   Constitutional: She is oriented to person, place, and time  She appears well-developed and well-nourished  HENT:   Head: Normocephalic and atraumatic  Mouth/Throat: Oropharynx is clear and moist    Eyes: EOM are normal    Neck: Normal range of motion  Neck supple  No JVD present  Cardiovascular: Normal rate, regular rhythm, normal heart sounds and intact distal pulses  Pulmonary/Chest: Effort normal and breath sounds normal    Abdominal: Soft  Bowel sounds are normal    Musculoskeletal: Normal range of motion  Neurological: She is alert and oriented to person, place, and time  Skin: Skin is warm and dry  Psychiatric: She has a normal mood and affect  Her behavior is normal  Judgment and thought content normal    Nursing note and vitals reviewed  Discussion/Summary:  Of asymptomatic in functional class 1  She has a history of a pacemaker secondary to syncope that has not recurred since pacemaker was placed  Recent assessment of pacemaker shows it to be functioning appropriately  Twelve lead EKG today shows sinus bradycardia at a rate of 57 with first-degree AV block  Blood pressure is currently controlled  Most recent lipid profile shows an LDL less than 100  She takes red yeast rice  She takes an aspirin tablet daily    I have made no changes I will see her again in 1 year

## 2018-04-19 ENCOUNTER — HOSPITAL ENCOUNTER (OUTPATIENT)
Dept: NEUROLOGY | Facility: AMBULATORY SURGERY CENTER | Age: 58
Discharge: HOME | End: 2018-04-19
Payer: COMMERCIAL

## 2018-04-19 DIAGNOSIS — R20.2 LEFT HAND PARESTHESIA: ICD-10-CM

## 2018-04-19 DIAGNOSIS — R20.2 ARM PARESTHESIA, LEFT: Primary | ICD-10-CM

## 2018-04-19 PROCEDURE — 95886 MUSC TEST DONE W/N TEST COMP: CPT | Performed by: PSYCHIATRY & NEUROLOGY

## 2018-04-19 PROCEDURE — 95909 NRV CNDJ TST 5-6 STUDIES: CPT | Performed by: PSYCHIATRY & NEUROLOGY

## 2018-04-20 ENCOUNTER — HOSPITAL ENCOUNTER (OUTPATIENT)
Dept: RADIOLOGY | Facility: HOSPITAL | Age: 58
Discharge: HOME | End: 2018-04-20
Payer: COMMERCIAL

## 2018-04-20 DIAGNOSIS — R20.2 ARM PARESTHESIA, LEFT: ICD-10-CM

## 2018-04-20 PROCEDURE — 72125 CT NECK SPINE W/O DYE: CPT

## 2018-04-23 ENCOUNTER — EVALUATION (OUTPATIENT)
Dept: PHYSICAL THERAPY | Facility: MEDICAL CENTER | Age: 58
End: 2018-04-23
Payer: COMMERCIAL

## 2018-04-23 DIAGNOSIS — M54.12 CERVICAL RADICULOPATHY: Primary | ICD-10-CM

## 2018-04-23 PROCEDURE — G8990 OTHER PT/OT CURRENT STATUS: HCPCS | Performed by: PHYSICAL THERAPIST

## 2018-04-23 PROCEDURE — G8991 OTHER PT/OT GOAL STATUS: HCPCS | Performed by: PHYSICAL THERAPIST

## 2018-04-23 PROCEDURE — 97161 PT EVAL LOW COMPLEX 20 MIN: CPT | Performed by: PHYSICAL THERAPIST

## 2018-04-23 NOTE — PROGRESS NOTES
PT Evaluation     Today's date: 2018  Patient name: Andie Madrid  : 1960  MRN: [id number]  Referring provider: Latoya Alanis, PT  Dx:   Encounter Diagnosis     ICD-10-CM    1  Cervical radiculopathy M54 12                   Assessment  Impairments: abnormal muscle firing, abnormal muscle tone, abnormal or restricted ROM, impaired physical strength and pain with function    Assessment details: Andie Madrid is a 56 y o  female presents with Cervical radiculopathy  (primary encounter diagnosis)  Andie Madrid presents with the above listed impairments resulting in daily functional deficits and negative impact to quality of life  Skilled PT services appropriate to facilitate return to prior level of function with transition to home exercise program when appropriate  Understanding of Dx/Px/POC: good   Prognosis: good    Goals  Impairment Goals  Resolve numbness in hand      Functional Goals  - Return to Prior Level of Function  -  - Patient will be independent with Hient specific functional goals identified at IE  - Increase Functional Status Measure to: above FOTO predicted    Plan  Patient would benefit from: skilled PT  Referral necessary: No  Planned therapy interventions: home exercise program, manual therapy, neuromuscular re-education, patient education, functional ROM exercises, strengthening, stretching, joint mobilization, graded activity and graded exercise  Frequency: 2x week  Duration in weeks: 12  Treatment plan discussed with: patient        Subjective Evaluation    History of Present Illness  Mechanism of injury: Hola Martinez presents to therapy with complaints of L hand numbness in 4th and 5th digits  Numbness is constantly present and is unchanged by position  She underwent EMG and most significant neural compromise was present in the cervical spine without presence of distal compression    She is here for direct access evaluation and to begin treatment while she awaits to hear from her PCP regarding results  Goals for therapy are to resolve numbness  Pain  Current pain ratin  At best pain ratin  At worst pain ratin      Diagnostic Tests  EMG: abnormal  Treatments  No previous or current treatments  Patient Goals  Patient goal: Resolve numbness         Objective     Special Questions  Negative for night pain, disturbed sleep, dizziness, trouble swallowing and history of trauma    Neurological Testing     Sensation   Cervical/Thoracic   Left   Intact: light touch    Right   Intact: light touch    Comments   Left light touch: with exceptoin of C8 dermatome  Reflexes   Left   Biceps (C5/C6): normal (2+)  Triceps (C7): normal (2+)  Holden's reflex: negative    Right   Biceps (C5/C6): normal (2+)  Triceps (C7): normal (2+)  Holden's reflex: negative    Active Range of Motion   Cervical/Thoracic Spine   Normal active range of motion    Passive Range of Motion   Cervical/Thoracic Spine   Normal passive range of motion    Joint Play Hypomobile: C7, T1, T2 and T3   Comments: No change in symptoms with CPA or UPA spring testing     Tests   Cervical     Left   Negative cervical compression test       Right   Negative cervical compression test       Left Shoulder   Negative ULTT1, ULTT2, ULTT3 and ULTT4  Right Shoulder   Negative ULTT1, ULTT2 and ULTT4       Additional Tests Details  Negative tinnels at cubital and carpal tunnel           Precautions: Pacemaker,    Daily Treatment Diary     Manual              CPA C7-T3 AF            Rotational grade IV CTJ mob's  AF                                                       Exercise Diary              Traction 10min  24#/  10#                                                                                                                                                                                                                                                                       Modalities

## 2018-04-26 ENCOUNTER — ANNUAL EXAM (OUTPATIENT)
Dept: OBGYN CLINIC | Facility: CLINIC | Age: 58
End: 2018-04-26
Payer: COMMERCIAL

## 2018-04-26 VITALS
SYSTOLIC BLOOD PRESSURE: 114 MMHG | WEIGHT: 207 LBS | BODY MASS INDEX: 32.49 KG/M2 | DIASTOLIC BLOOD PRESSURE: 70 MMHG | HEIGHT: 67 IN

## 2018-04-26 DIAGNOSIS — Z80.41 FAMILY HISTORY OF OVARIAN CANCER: ICD-10-CM

## 2018-04-26 DIAGNOSIS — Z01.419 WOMEN'S ANNUAL ROUTINE GYNECOLOGICAL EXAMINATION: Primary | ICD-10-CM

## 2018-04-26 DIAGNOSIS — Z80.3 FAMILY HISTORY OF BREAST CANCER IN FIRST DEGREE RELATIVE: ICD-10-CM

## 2018-04-26 DIAGNOSIS — Z90.710 HISTORY OF HYSTERECTOMY FOR BENIGN DISEASE: ICD-10-CM

## 2018-04-26 PROCEDURE — 99396 PREV VISIT EST AGE 40-64: CPT | Performed by: OBSTETRICS & GYNECOLOGY

## 2018-04-26 PROCEDURE — G0143 SCR C/V CYTO,THINLAYER,RESCR: HCPCS | Performed by: OBSTETRICS & GYNECOLOGY

## 2018-04-26 NOTE — PROGRESS NOTES
Assessment   Well-woman exam  Family history of breast cancer first-degree relative, her mother  Family history of ovarian cancer first-degree relative, her mother  Status post hysterectomy, uterine fibroids and AUB  History of endometriosis         Plan   Referral to breast surgery regarding BRCA testing  Repeat pelvic ultrasound for ovarian screening   All questions answered  Await pap smear results  Subjective no new gyn concerns or complaints     Ty Leahy is a 56 y o  female who presents for annual exam  Periods absence status post hysterectomy  No intermenstrual bleeding, spotting, or discharge  The patient reports that there is not domestic violence in her life  Current contraception: none  History of abnormal Pap smear: no  Family history of uterine or ovarian cancer: yes -mother  Regular self breast exam: yes  History of abnormal mammogram: no  Family history of breast cancer: yes -mother  History of abnormal lipids: yes - patient  Menstrual History:  OB History     No data available         Menarche age: 17  No LMP recorded (lmp unknown)  Patient has had a hysterectomy  Review of Systems  Pertinent items are noted in HPI  Objective      /70 (BP Location: Left arm, Patient Position: Sitting, Cuff Size: Large)   Ht 5' 7" (1 702 m)   Wt 93 9 kg (207 lb)   LMP  (LMP Unknown)   Breastfeeding?  No   BMI 32 42 kg/m²     General:   alert and oriented, in no acute distress, alert, appears stated age and cooperative   Heart: regular rate and rhythm, S1, S2 normal, no murmur, click, rub or gallop   Lungs: clear to auscultation bilaterally   Abdomen: soft, non-tender, without masses or organomegaly   Vulva: normal, Bartholin's, Urethra, Hepzibah's normal   Vagina: normal mucosa   Cervix: surgically absent   Uterus: surgically absent   Adnexa: normal adnexa   Breast exam bilateral breast exam in the sitting and supine position no palpable masses or visible lesions no supraclavicular axillary lymphadenopathy noted    No nipple discharge

## 2018-04-27 ENCOUNTER — OFFICE VISIT (OUTPATIENT)
Dept: PHYSICAL THERAPY | Facility: MEDICAL CENTER | Age: 58
End: 2018-04-27
Payer: COMMERCIAL

## 2018-04-27 DIAGNOSIS — M54.12 CERVICAL RADICULOPATHY: Primary | ICD-10-CM

## 2018-04-27 PROCEDURE — 97140 MANUAL THERAPY 1/> REGIONS: CPT | Performed by: PHYSICAL THERAPIST

## 2018-04-27 NOTE — PROGRESS NOTES
Daily Note     Today's date: 2018  Patient name: Jatinder Bhatt  : 1960  MRN: [id number]  Referring provider: Fernando Regalado, PT  Dx:   Encounter Diagnosis     ICD-10-CM    1  Cervical radiculopathy M54 12                   Subjective: Estela Paez reports that she notes less numbness in her palm since last visit  Objective: See treatment diary below  Precautions: Pacemaker,    Daily Treatment Diary     Manual             CPA C7-T3 AF AF           Rotational grade IV CTJ mob's  AF AF           Followed by nerve glides  AF                                         Exercise Diary              Traction 10min  24#/  10#                                                                                                                                                                                                                                                                       Modalities                                                         Assessment: Tolerated treatment well  Patient would benefit from continued PT to resolve UE symptoms       Plan: Continue per plan of care

## 2018-04-30 LAB
LAB AP GYN PRIMARY INTERPRETATION: NORMAL
Lab: NORMAL

## 2018-05-04 ENCOUNTER — OFFICE VISIT (OUTPATIENT)
Dept: PHYSICAL THERAPY | Facility: MEDICAL CENTER | Age: 58
End: 2018-05-04
Payer: COMMERCIAL

## 2018-05-04 DIAGNOSIS — M54.12 CERVICAL RADICULOPATHY: Primary | ICD-10-CM

## 2018-05-04 PROCEDURE — 97140 MANUAL THERAPY 1/> REGIONS: CPT | Performed by: PHYSICAL THERAPIST

## 2018-05-05 NOTE — PROGRESS NOTES
Daily Note     Today's date: 2018  Patient name: Rere Israel  : 1960  MRN: [id number]  Referring provider: Gianfranco Rubio PT  Dx:   Encounter Diagnosis     ICD-10-CM    1  Cervical radiculopathy M54 12                   Subjective: Rosaura Diaz reports the the feeling in her hand and pinky is changing  No longer feels "dead" as before and is more intermittent numbness       Objective: See treatment diary below  Precautions: Pacemaker,    Daily Treatment Diary     Manual            CPA C7-T3 AF AF AF          Rotational grade IV CTJ mob's  AF AF AF          Followed by nerve glides  AF AF                                        Exercise Diary              Traction 10min  24#/  10#  10min  24#  10#                                                                                                                                                                                                                                                                     Modalities                                                         Assessment: Tolerated treatment well  Patient would benefit from continued PT and manual techniques given positive response to hand symptoms       Plan: Continue per plan of care

## 2018-05-11 ENCOUNTER — OFFICE VISIT (OUTPATIENT)
Dept: PHYSICAL THERAPY | Facility: MEDICAL CENTER | Age: 58
End: 2018-05-11
Payer: COMMERCIAL

## 2018-05-11 DIAGNOSIS — M54.12 CERVICAL RADICULOPATHY: Primary | ICD-10-CM

## 2018-05-11 PROCEDURE — 97140 MANUAL THERAPY 1/> REGIONS: CPT | Performed by: PHYSICAL THERAPIST

## 2018-05-12 NOTE — PROGRESS NOTES
Daily Note     Today's date: 2018  Patient name: Marielena South  : 1960  MRN: [id number]  Referring provider: Hola Rodriguez, PT  Dx:   Encounter Diagnosis     ICD-10-CM    1  Cervical radiculopathy M54 12                   Subjective: Yoshi Brennan reports she has some neck pain today  L hand remains improved but static from prior visit  Objective: See treatment diary below    Precautions: Pacemaker,    Daily Treatment Diary     Manual           CPA C7-T3 AF AF AF AF         Rotational grade IV CTJ mob's  AF AF AF aF         Followed by nerve glides  AF AF aF                                       Exercise Diary              Traction 10min  24#/  10#  10min  24#  10# 10 min  24#  10#                                                                                                                                                                                                                                                                    Modalities                                                         Assessment: Tolerated treatment well  Patient exhibited good technique with therapeutic exercises and would benefit from continued PT      Plan: Continue per plan of care

## 2018-05-17 ENCOUNTER — ULTRASOUND (OUTPATIENT)
Dept: OBGYN CLINIC | Facility: CLINIC | Age: 58
End: 2018-05-17
Payer: COMMERCIAL

## 2018-05-17 DIAGNOSIS — Z80.41 FAMILY HISTORY OF OVARIAN CARCINOMA: Primary | ICD-10-CM

## 2018-05-17 PROCEDURE — 76856 US EXAM PELVIC COMPLETE: CPT

## 2018-05-17 NOTE — PROGRESS NOTES
AMB US Pelvic Non OB  Date/Time: 5/17/2018 9:13 AM  Performed by: Shawn James by: Ricardo Drain     Procedure details:     Indications comment:  Family history ov ca    Technique:  US Pelvic, Non-OB with complete exam  Left ovary findings:     Left ovary:  Visualized    Diameter (mm):  30    Length (mm):  19    Width (mm):  23  Right ovary findings:     Right ovary:  Visualized    Diameter (mm):  28    Length (mm):  25    Width (mm):  18  Other findings:     Free pelvic fluid: not identified      Free peritoneal fluid: not identified    Post-Procedure Details:     Impression:  No cyst or mass identified    Complications: no complications

## 2018-05-18 ENCOUNTER — OFFICE VISIT (OUTPATIENT)
Dept: PHYSICAL THERAPY | Facility: MEDICAL CENTER | Age: 58
End: 2018-05-18
Payer: COMMERCIAL

## 2018-05-18 DIAGNOSIS — M54.12 CERVICAL RADICULOPATHY: Primary | ICD-10-CM

## 2018-05-18 PROCEDURE — 97140 MANUAL THERAPY 1/> REGIONS: CPT | Performed by: PHYSICAL THERAPIST

## 2018-05-18 PROCEDURE — 97110 THERAPEUTIC EXERCISES: CPT | Performed by: PHYSICAL THERAPIST

## 2018-05-19 NOTE — PROGRESS NOTES
Daily Note     Today's date: 2018  Patient name: Tahir Nesbitt  : 1960  MRN: [id number]  Referring provider: Ankush López, PT  Dx:   Encounter Diagnosis     ICD-10-CM    1  Cervical radiculopathy M54 12                   Subjective: Marisol Men reports her neck is feeling very good since last visit  Numbness present in hand but is aware it may last for quite some time       Objective: See treatment diary below  Precautions: Pacemaker,    Daily Treatment Diary     Manual          CPA C7-T3 AF AF AF AF AF        Rotational grade IV CTJ mob's  AF AF AF aF AF        Followed by nerve glides  AF AF aF AF                                      Exercise Diary              Traction 10min  24#/  10#  10min  24#  10# 10 min  24#  10# NP today        pec stretch     30sec  X3        Scap 4     Red  3X10                                                                                                                                                                                                                                         Modalities                                                         Assessment: Tolerated treatment well  Patient exhibited good technique with therapeutic exercises and would benefit from continued PT      Plan: Continue per plan of care    Follow up as needed

## 2018-05-29 ENCOUNTER — OFFICE VISIT (OUTPATIENT)
Dept: OBGYN CLINIC | Facility: CLINIC | Age: 58
End: 2018-05-29
Payer: COMMERCIAL

## 2018-05-29 VITALS
BODY MASS INDEX: 32.74 KG/M2 | HEIGHT: 67 IN | SYSTOLIC BLOOD PRESSURE: 112 MMHG | WEIGHT: 208.6 LBS | DIASTOLIC BLOOD PRESSURE: 78 MMHG

## 2018-05-29 DIAGNOSIS — Z84.2: ICD-10-CM

## 2018-05-29 DIAGNOSIS — Z80.41 FAMILY HISTORY OF OVARIAN CANCER: Primary | ICD-10-CM

## 2018-05-29 DIAGNOSIS — Z90.710 STATUS POST LAPAROSCOPY-ASSISTED VAGINAL HYSTERECTOMY: ICD-10-CM

## 2018-05-29 PROCEDURE — 99213 OFFICE O/P EST LOW 20 MIN: CPT | Performed by: OBSTETRICS & GYNECOLOGY

## 2018-05-29 NOTE — PROGRESS NOTES
Assessment/Plan:   Here for test results     Diagnoses and all orders for this visit:    Family history of ovarian cancer    Family history of breast disease    Status post laparoscopy-assisted vaginal hysterectomy        Subjective:  Here for test results   Patient ID: Mae Whitley is a 66 y o  female  HPI   62-year-old female here for test results regarding recent pelvic ultrasound  Patient's mother was diagnosed and treated for breast cancer  Also recently her mother was treated for ovarian cancer  Recent follow-up studies regarding her mother have been negative  Screening pelvic ultrasound was completed 2 weeks ago and was normal  There are no pelvic masses no ovarian cyst no free fluid identified  Her recent Pap smear was also normal   Screening mammography was also normal   This patient has had a vaginal hysterectomy many years ago for uterine prolapse and fibroids  Patient denies any pelvic pain symptoms bloating or abdominal swelling  She does have occasional hot flashes and some vaginal dryness  Declines any hormonal therapy treatments  Recommend continue annual surveillance declines any further testing at this time  Review of Systems   All other systems reviewed and are negative  Objective:  No acute distress     Physical Exam   Constitutional: She is oriented to person, place, and time  She appears well-developed and well-nourished  HENT:   Head: Normocephalic  Eyes: Pupils are equal, round, and reactive to light  Neck: Normal range of motion  Genitourinary: Vagina normal    Genitourinary Comments: Pelvic exam  Recent pelvic exam within normal limits status post vaginal hysterectomy no pelvic masses noted  Musculoskeletal: Normal range of motion  Neurological: She is alert and oriented to person, place, and time  Skin: Skin is warm and dry  Psychiatric: She has a normal mood and affect   Her behavior is normal  Judgment and thought content normal

## 2018-06-28 ENCOUNTER — APPOINTMENT (OUTPATIENT)
Dept: LAB | Facility: MEDICAL CENTER | Age: 58
End: 2018-06-28
Payer: COMMERCIAL

## 2018-06-28 ENCOUNTER — TRANSCRIBE ORDERS (OUTPATIENT)
Dept: ADMINISTRATIVE | Facility: HOSPITAL | Age: 58
End: 2018-06-28

## 2018-06-28 DIAGNOSIS — E78.5 HYPERLIPIDEMIA, UNSPECIFIED HYPERLIPIDEMIA TYPE: ICD-10-CM

## 2018-06-28 DIAGNOSIS — E66.9 OBESITY, UNSPECIFIED CLASSIFICATION, UNSPECIFIED OBESITY TYPE, UNSPECIFIED WHETHER SERIOUS COMORBIDITY PRESENT: ICD-10-CM

## 2018-06-28 DIAGNOSIS — I10 HYPERTENSION, UNSPECIFIED TYPE: ICD-10-CM

## 2018-06-28 DIAGNOSIS — R73.09 OTHER ABNORMAL GLUCOSE: Primary | ICD-10-CM

## 2018-06-28 DIAGNOSIS — R73.09 OTHER ABNORMAL GLUCOSE: ICD-10-CM

## 2018-06-28 LAB
ALBUMIN SERPL BCP-MCNC: 3.5 G/DL (ref 3.5–5)
ALP SERPL-CCNC: 74 U/L (ref 46–116)
ALT SERPL W P-5'-P-CCNC: 18 U/L (ref 12–78)
ANION GAP SERPL CALCULATED.3IONS-SCNC: 5 MMOL/L (ref 4–13)
AST SERPL W P-5'-P-CCNC: 13 U/L (ref 5–45)
BILIRUB SERPL-MCNC: 0.43 MG/DL (ref 0.2–1)
BUN SERPL-MCNC: 25 MG/DL (ref 5–25)
CALCIUM SERPL-MCNC: 9 MG/DL (ref 8.3–10.1)
CHLORIDE SERPL-SCNC: 106 MMOL/L (ref 100–108)
CHOLEST SERPL-MCNC: 157 MG/DL (ref 50–200)
CO2 SERPL-SCNC: 29 MMOL/L (ref 21–32)
CREAT SERPL-MCNC: 0.93 MG/DL (ref 0.6–1.3)
EST. AVERAGE GLUCOSE BLD GHB EST-MCNC: 105 MG/DL
GFR SERPL CREATININE-BSD FRML MDRD: 69 ML/MIN/1.73SQ M
GLUCOSE P FAST SERPL-MCNC: 88 MG/DL (ref 65–99)
HBA1C MFR BLD: 5.3 % (ref 4.2–6.3)
HDLC SERPL-MCNC: 47 MG/DL (ref 40–60)
LDLC SERPL CALC-MCNC: 100 MG/DL (ref 0–100)
NONHDLC SERPL-MCNC: 110 MG/DL
POTASSIUM SERPL-SCNC: 4.2 MMOL/L (ref 3.5–5.3)
PROT SERPL-MCNC: 7 G/DL (ref 6.4–8.2)
SODIUM SERPL-SCNC: 140 MMOL/L (ref 136–145)
TRIGL SERPL-MCNC: 52 MG/DL

## 2018-06-28 PROCEDURE — 83036 HEMOGLOBIN GLYCOSYLATED A1C: CPT

## 2018-06-28 PROCEDURE — 80053 COMPREHEN METABOLIC PANEL: CPT

## 2018-06-28 PROCEDURE — 36415 COLL VENOUS BLD VENIPUNCTURE: CPT

## 2018-06-28 PROCEDURE — 80061 LIPID PANEL: CPT

## 2018-07-05 PROBLEM — E66.9 OBESITY (BMI 30-39.9): Status: ACTIVE | Noted: 2018-07-05

## 2018-07-05 PROBLEM — R73.09 ABNORMAL BLOOD SUGAR: Status: ACTIVE | Noted: 2018-07-05

## 2018-07-09 ENCOUNTER — OFFICE VISIT (OUTPATIENT)
Dept: FAMILY MEDICINE CLINIC | Facility: CLINIC | Age: 58
End: 2018-07-09
Payer: COMMERCIAL

## 2018-07-09 VITALS
HEART RATE: 58 BPM | OXYGEN SATURATION: 99 % | DIASTOLIC BLOOD PRESSURE: 76 MMHG | TEMPERATURE: 96.8 F | BODY MASS INDEX: 32.69 KG/M2 | WEIGHT: 208.3 LBS | SYSTOLIC BLOOD PRESSURE: 108 MMHG | HEIGHT: 67 IN | RESPIRATION RATE: 16 BRPM

## 2018-07-09 DIAGNOSIS — E78.2 MIXED HYPERLIPIDEMIA: Primary | ICD-10-CM

## 2018-07-09 DIAGNOSIS — Z13.0 SCREENING FOR DEFICIENCY ANEMIA: ICD-10-CM

## 2018-07-09 DIAGNOSIS — R00.1 SINUS BRADYCARDIA: ICD-10-CM

## 2018-07-09 DIAGNOSIS — M54.12 CERVICAL RADICULOPATHY: ICD-10-CM

## 2018-07-09 DIAGNOSIS — R73.09 ABNORMAL BLOOD SUGAR: ICD-10-CM

## 2018-07-09 DIAGNOSIS — J30.1 ALLERGIC RHINITIS DUE TO POLLEN, UNSPECIFIED SEASONALITY: ICD-10-CM

## 2018-07-09 DIAGNOSIS — E66.9 OBESITY (BMI 30-39.9): ICD-10-CM

## 2018-07-09 PROBLEM — J30.9 ALLERGIC RHINITIS: Status: ACTIVE | Noted: 2018-07-09

## 2018-07-09 PROCEDURE — 99214 OFFICE O/P EST MOD 30 MIN: CPT | Performed by: FAMILY MEDICINE

## 2018-07-09 PROCEDURE — 1036F TOBACCO NON-USER: CPT | Performed by: FAMILY MEDICINE

## 2018-07-09 NOTE — ASSESSMENT & PLAN NOTE
History of sinus bradycardia / syncopal episode  Status post pacemaker insertion  Patient has not experienced any symptoms since insertion     Last seen by Cardiology April 2018 (Dr Jarrett Alvarez)   Continue f/u as directed (yearly)

## 2018-07-09 NOTE — ASSESSMENT & PLAN NOTE
Stable BMI at 32  Patient still meets with nutritional list   Continue diet and exercise    Patient has restarted weight lifting again

## 2018-07-09 NOTE — PROGRESS NOTES
Assessment/Plan:    Sinus bradycardia  History of sinus bradycardia / syncopal episode  Status post pacemaker insertion  Patient has not experienced any symptoms since insertion     Last seen by Cardiology April 2018 (Dr Sunday Lazaro)  Continue f/u as directed (yearly)    Abnormal blood sugar   A1c 5 3%  Was 5 5%  Recommend continue diet, exercise, and weight loss  Will continue to monitor    Allergic rhinitis   Doing well on allergy meds    Cervical radiculopathy   Overall stable/ improved since finishing physical therapy  Still with some numbness in left  hand    Mixed hyperlipidemia   Cholesterol 157/100  Has elevated somewhat since decreasing dosage of red yeast   Recommend increase back to 2400 mg daily    Obesity (BMI 30-39  9)   Stable BMI at 32  Patient still meets with nutritional list   Continue diet and exercise  Patient has restarted weight lifting again       Diagnoses and all orders for this visit:    Mixed hyperlipidemia  -     Lipid Panel with Direct LDL reflex; Future    Obesity (BMI 30-39  9)    Sinus bradycardia  -     TSH, 3rd generation with Free T4 reflex; Future    Abnormal blood sugar  -     Comprehensive metabolic panel; Future  -     Hemoglobin A1C; Future    Screening for deficiency anemia  -     CBC and differential; Future    Cervical radiculopathy    Allergic rhinitis due to pollen, unspecified seasonality       6 MONTHS, FASTING BLOOD WORK PRIOR AT [facility]    Subjective:      Patient ID: Mae Whitley is a 66 y o  female  Patient presents for recheck of chronic medical problems today  Overall, patient is feeling well without complaints  She is still watching her diet and meets with nutritional list regularly  She is also exercising  Doing well on red yeast rice and allergy meds    Patient had labs drawn June 28        The following portions of the patient's history were reviewed and updated as appropriate: allergies, current medications, past family history, past medical history, past social history, past surgical history and problem list     Review of Systems   Respiratory: Negative  Cardiovascular: Negative  Gastrointestinal: Negative  Genitourinary: Negative  Objective:      /76 (BP Location: Left arm, Patient Position: Sitting, Cuff Size: Large)   Pulse 58   Temp (!) 96 8 °F (36 °C) (Tympanic)   Resp 16   Ht 5' 7" (1 702 m)   Wt 94 5 kg (208 lb 4 8 oz)   LMP  (LMP Unknown)   SpO2 99%   BMI 32 62 kg/m²          Physical Exam   Cardiovascular: Normal rate, regular rhythm, normal heart sounds and intact distal pulses  Carotids: no bruits  Ext: no edema   Pulmonary/Chest: Effort normal  No respiratory distress  She has no wheezes  She has no rales  Psychiatric: She has a normal mood and affect   Her behavior is normal  Thought content normal

## 2018-07-09 NOTE — ASSESSMENT & PLAN NOTE
Cholesterol 157/100     Has elevated somewhat since decreasing dosage of red yeast   Recommend increase back to 2400 mg daily

## 2018-07-27 ENCOUNTER — IN-CLINIC DEVICE VISIT (OUTPATIENT)
Dept: CARDIOLOGY CLINIC | Facility: CLINIC | Age: 58
End: 2018-07-27
Payer: COMMERCIAL

## 2018-07-27 DIAGNOSIS — Z95.0 PRESENCE OF CARDIAC PACEMAKER: ICD-10-CM

## 2018-07-27 DIAGNOSIS — R55 SYNCOPE AND COLLAPSE: Primary | ICD-10-CM

## 2018-07-27 PROCEDURE — 93280 PM DEVICE PROGR EVAL DUAL: CPT | Performed by: INTERNAL MEDICINE

## 2018-07-27 NOTE — PROGRESS NOTES
Results for orders placed or performed in visit on 07/27/18   Cardiac EP device report    Narrative    MDT DUAL CHAMBER PM  DEVICE INTERROGATED IN THE [facility] OFFICE  bATTERY VOLTAGE ADEQUATE 29 MO)  AP: 32 9%  : 0 9%  ALL LEAD PARAMETERS WITHIN NORMAL LIMITS  NO SIGNIFICANT HIGH RATE EPISODES  SRD EPISODES NOTED  NO PROGRAMMING CHANGES MADE TO DEVICE PARAMETERS  PACEMAKER FUNCTIONING APPROPRIATELY    [address] [place] [address]

## 2018-10-17 ENCOUNTER — TELEPHONE (OUTPATIENT)
Dept: FAMILY MEDICINE CLINIC | Facility: CLINIC | Age: 58
End: 2018-10-17

## 2018-10-17 DIAGNOSIS — T78.40XS ALLERGIC REACTION, SEQUELA: Primary | ICD-10-CM

## 2018-10-17 PROBLEM — T78.40XA ALLERGIC REACTION: Status: ACTIVE | Noted: 2018-10-17

## 2018-10-17 RX ORDER — EPINEPHRINE 0.3 MG/.3ML
0.3 INJECTION SUBCUTANEOUS AS NEEDED
Qty: 2 EACH | Refills: 3 | Status: SHIPPED | OUTPATIENT
Start: 2018-10-17 | End: 2019-09-27 | Stop reason: SDUPTHER

## 2018-10-17 NOTE — TELEPHONE ENCOUNTER
Pecolia Duane from [address] called regarding pt's Epi pens  She stated the pt's insurance will not cover the brand name, is it ok to give her the generic pen   Please call Pecolia Duane at [phone]

## 2018-10-17 NOTE — TELEPHONE ENCOUNTER
Called Pharmacy and spoke with Yara Shrestha   Informed her per the pt's Dr it is ok to fill the Epi pen with a generic

## 2018-11-06 ENCOUNTER — REMOTE DEVICE CLINIC VISIT (OUTPATIENT)
Dept: CARDIOLOGY CLINIC | Facility: CLINIC | Age: 58
End: 2018-11-06
Payer: COMMERCIAL

## 2018-11-06 DIAGNOSIS — Z95.0 CARDIAC PACEMAKER: ICD-10-CM

## 2018-11-06 DIAGNOSIS — R00.1 SINUS BRADYCARDIA: ICD-10-CM

## 2018-11-06 DIAGNOSIS — R55 SYNCOPE AND COLLAPSE: Primary | ICD-10-CM

## 2018-11-06 PROCEDURE — 93294 REM INTERROG EVL PM/LDLS PM: CPT | Performed by: INTERNAL MEDICINE

## 2018-11-06 PROCEDURE — 93296 REM INTERROG EVL PM/IDS: CPT | Performed by: INTERNAL MEDICINE

## 2018-11-06 NOTE — PROGRESS NOTES
Results for orders placed or performed in visit on 11/06/18   Cardiac EP device report    Narrative    MDT DUAL CHAMBER PM  CARELINK TRANSMISSION: BATTERY VOLTAGE ADEQUATE (~27 MOS  )  ALL AVAILABLE LEAD PARAMETERS WITHIN NORMAL LIMITS & STABLE  76 SUDDEN RATE DROP EPISODES  1 VHR EPISODE WITH AVAIL  EGRM SHOWING STACH - DURATION OF 12 SEC/AVG RATE 160 BPM (1:1)  NO BB, ASA 81 ONLY  PACEMAKER FUNCTIONING APPROPRIATELY    eb

## 2019-01-03 ENCOUNTER — APPOINTMENT (OUTPATIENT)
Dept: LAB | Facility: MEDICAL CENTER | Age: 59
End: 2019-01-03
Payer: COMMERCIAL

## 2019-01-03 DIAGNOSIS — R00.1 SINUS BRADYCARDIA: ICD-10-CM

## 2019-01-03 DIAGNOSIS — E78.2 MIXED HYPERLIPIDEMIA: ICD-10-CM

## 2019-01-03 DIAGNOSIS — Z13.0 SCREENING FOR DEFICIENCY ANEMIA: ICD-10-CM

## 2019-01-03 DIAGNOSIS — R73.09 ABNORMAL BLOOD SUGAR: ICD-10-CM

## 2019-01-03 LAB
ALBUMIN SERPL BCP-MCNC: 3.6 G/DL (ref 3.5–5)
ALP SERPL-CCNC: 80 U/L (ref 46–116)
ALT SERPL W P-5'-P-CCNC: 25 U/L (ref 12–78)
ANION GAP SERPL CALCULATED.3IONS-SCNC: 5 MMOL/L (ref 4–13)
AST SERPL W P-5'-P-CCNC: 14 U/L (ref 5–45)
BASOPHILS # BLD AUTO: 0.06 THOUSANDS/ΜL (ref 0–0.1)
BASOPHILS NFR BLD AUTO: 1 % (ref 0–1)
BILIRUB SERPL-MCNC: 0.52 MG/DL (ref 0.2–1)
BUN SERPL-MCNC: 25 MG/DL (ref 5–25)
CALCIUM SERPL-MCNC: 9.1 MG/DL (ref 8.3–10.1)
CHLORIDE SERPL-SCNC: 105 MMOL/L (ref 100–108)
CHOLEST SERPL-MCNC: 213 MG/DL (ref 50–200)
CO2 SERPL-SCNC: 28 MMOL/L (ref 21–32)
CREAT SERPL-MCNC: 0.99 MG/DL (ref 0.6–1.3)
EOSINOPHIL # BLD AUTO: 0.26 THOUSAND/ΜL (ref 0–0.61)
EOSINOPHIL NFR BLD AUTO: 4 % (ref 0–6)
ERYTHROCYTE [DISTWIDTH] IN BLOOD BY AUTOMATED COUNT: 12.6 % (ref 11.6–15.1)
EST. AVERAGE GLUCOSE BLD GHB EST-MCNC: 114 MG/DL
GFR SERPL CREATININE-BSD FRML MDRD: 64 ML/MIN/1.73SQ M
GLUCOSE P FAST SERPL-MCNC: 89 MG/DL (ref 65–99)
HBA1C MFR BLD: 5.6 % (ref 4.2–6.3)
HCT VFR BLD AUTO: 40.6 % (ref 34.8–46.1)
HDLC SERPL-MCNC: 67 MG/DL (ref 40–60)
HGB BLD-MCNC: 12.8 G/DL (ref 11.5–15.4)
IMM GRANULOCYTES # BLD AUTO: 0.02 THOUSAND/UL (ref 0–0.2)
IMM GRANULOCYTES NFR BLD AUTO: 0 % (ref 0–2)
LDLC SERPL CALC-MCNC: 131 MG/DL (ref 0–100)
LYMPHOCYTES # BLD AUTO: 2.09 THOUSANDS/ΜL (ref 0.6–4.47)
LYMPHOCYTES NFR BLD AUTO: 33 % (ref 14–44)
MCH RBC QN AUTO: 29.7 PG (ref 26.8–34.3)
MCHC RBC AUTO-ENTMCNC: 31.5 G/DL (ref 31.4–37.4)
MCV RBC AUTO: 94 FL (ref 82–98)
MONOCYTES # BLD AUTO: 0.42 THOUSAND/ΜL (ref 0.17–1.22)
MONOCYTES NFR BLD AUTO: 7 % (ref 4–12)
NEUTROPHILS # BLD AUTO: 3.43 THOUSANDS/ΜL (ref 1.85–7.62)
NEUTS SEG NFR BLD AUTO: 55 % (ref 43–75)
NRBC BLD AUTO-RTO: 0 /100 WBCS
PLATELET # BLD AUTO: 297 THOUSANDS/UL (ref 149–390)
PMV BLD AUTO: 10.3 FL (ref 8.9–12.7)
POTASSIUM SERPL-SCNC: 4.3 MMOL/L (ref 3.5–5.3)
PROT SERPL-MCNC: 7.5 G/DL (ref 6.4–8.2)
RBC # BLD AUTO: 4.31 MILLION/UL (ref 3.81–5.12)
SODIUM SERPL-SCNC: 138 MMOL/L (ref 136–145)
T4 FREE SERPL-MCNC: 0.77 NG/DL (ref 0.76–1.46)
TRIGL SERPL-MCNC: 76 MG/DL
TSH SERPL DL<=0.05 MIU/L-ACNC: 4.17 UIU/ML (ref 0.36–3.74)
WBC # BLD AUTO: 6.28 THOUSAND/UL (ref 4.31–10.16)

## 2019-01-03 PROCEDURE — 83036 HEMOGLOBIN GLYCOSYLATED A1C: CPT

## 2019-01-03 PROCEDURE — 85025 COMPLETE CBC W/AUTO DIFF WBC: CPT

## 2019-01-03 PROCEDURE — 80053 COMPREHEN METABOLIC PANEL: CPT

## 2019-01-03 PROCEDURE — 84439 ASSAY OF FREE THYROXINE: CPT

## 2019-01-03 PROCEDURE — 80061 LIPID PANEL: CPT

## 2019-01-03 PROCEDURE — 36415 COLL VENOUS BLD VENIPUNCTURE: CPT

## 2019-01-03 PROCEDURE — 84443 ASSAY THYROID STIM HORMONE: CPT

## 2019-01-06 PROBLEM — R55 SYNCOPE: Status: RESOLVED | Noted: 2018-04-18 | Resolved: 2019-01-06

## 2019-01-06 PROBLEM — I10 HYPERTENSION: Status: RESOLVED | Noted: 2018-04-18 | Resolved: 2019-01-06

## 2019-01-06 PROBLEM — E03.8 SUBCLINICAL HYPOTHYROIDISM: Status: ACTIVE | Noted: 2019-01-06

## 2019-01-07 ENCOUNTER — OFFICE VISIT (OUTPATIENT)
Dept: FAMILY MEDICINE CLINIC | Facility: CLINIC | Age: 59
End: 2019-01-07
Payer: COMMERCIAL

## 2019-01-07 VITALS
HEART RATE: 66 BPM | BODY MASS INDEX: 34.9 KG/M2 | OXYGEN SATURATION: 99 % | HEIGHT: 67 IN | DIASTOLIC BLOOD PRESSURE: 80 MMHG | WEIGHT: 222.38 LBS | TEMPERATURE: 97.7 F | RESPIRATION RATE: 17 BRPM | SYSTOLIC BLOOD PRESSURE: 124 MMHG

## 2019-01-07 DIAGNOSIS — M54.12 CERVICAL RADICULOPATHY: ICD-10-CM

## 2019-01-07 DIAGNOSIS — E78.2 MIXED HYPERLIPIDEMIA: ICD-10-CM

## 2019-01-07 DIAGNOSIS — E03.8 SUBCLINICAL HYPOTHYROIDISM: ICD-10-CM

## 2019-01-07 DIAGNOSIS — R00.1 SINUS BRADYCARDIA: Primary | ICD-10-CM

## 2019-01-07 DIAGNOSIS — R73.09 ABNORMAL BLOOD SUGAR: ICD-10-CM

## 2019-01-07 DIAGNOSIS — E66.9 OBESITY (BMI 30-39.9): ICD-10-CM

## 2019-01-07 PROCEDURE — 99214 OFFICE O/P EST MOD 30 MIN: CPT | Performed by: FAMILY MEDICINE

## 2019-01-07 PROCEDURE — 3008F BODY MASS INDEX DOCD: CPT | Performed by: FAMILY MEDICINE

## 2019-01-07 PROCEDURE — 1036F TOBACCO NON-USER: CPT | Performed by: FAMILY MEDICINE

## 2019-01-07 RX ORDER — LEVOTHYROXINE SODIUM 0.05 MG/1
50 TABLET ORAL DAILY
Qty: 90 TABLET | Refills: 0 | Status: SHIPPED | OUTPATIENT
Start: 2019-01-07 | End: 2019-03-27 | Stop reason: SDUPTHER

## 2019-01-07 NOTE — PROGRESS NOTES
[facility]      NAME: Marlin Spangler  AGE: 66 y o  SEX: female  : 1960   MRN: [id number]    DATE: 2019  TIME: 8:48 AM    Assessment and Plan     Problem List Items Addressed This Visit     Mixed hyperlipidemia      Cholesterol 213/131, HDL 67  (was 157/100)  May be worse due to subclinical hypothyroid  Recommend continue diet and exercise  Will continue to monitor         Relevant Orders    Lipid Panel with Direct LDL reflex    Sinus bradycardia - Primary      History of sinus bradycardia in syncopal episode  She is status post pacemaker insertion  Doing well without chest pain or shortness of breath  Still sees cardiologist yearly (Dr Zuleima Andre)  Cervical radiculopathy      Patient finished physical therapy         Obesity (BMI 30-39  9)      Patient has gained some weight since last visit  She is still it hearing to 1400 calorie diet and exercising regularly  But lately she has not been exercising as often due to ongoing fatigue  Will start levothyroxine today due to subclinical hypothyroid  Will continue to monitor weights         Abnormal blood sugar      Blood sugar 89, A1c 5 6%  Continue reduced carb diet and exercise  Will continue to monitor         Relevant Orders    Comprehensive metabolic panel    Hemoglobin A1C    Subclinical hypothyroidism      Patient has been complaining of ongoing fatigue, moodiness recently  TSH is 4 170  Will start levothyroxine 50 mcg daily  Will recheck TSH in approximately 2 months  (And again in 6 months)  Relevant Medications    levothyroxine 50 mcg tablet    Other Relevant Orders    TSH, 3rd generation with Free T4 reflex    TSH, 3rd generation with Free T4 reflex              Return to office in:  TSH in 2 months    Will call   otherwise 6 months, fasting blood work prior at [facility] Complaint   Patient presents with    Follow-up     6m check up to chronic conditions and to discuss blood work from 1/3/19    Weight Gain     having muscle cramps and feeling cold x 3 months  History of Present Illness      Patient presents for recheck of chronic medical problems today  Lately, she has not been feeling well  Less energy, problems losing weight, dia  He still is watching her diet and currently on 1400 calorie diet she is also exercising, but lately not is often due to fatigue  No other problems today  Still taking red yeast rice for cholesterol  Still sees Cardiology yearly for pacemaker checks  She had labs drawn on January 3rd        The following portions of the patient's history were reviewed and updated as appropriate: allergies, current medications, past family history, past medical history, past social history, past surgical history and problem list     Review of Systems   Review of Systems   Respiratory: Negative  Cardiovascular: Negative  Gastrointestinal: Negative  Genitourinary: Negative  Active Problem List     Patient Active Problem List   Diagnosis    Mixed hyperlipidemia    Sinus bradycardia    Left hand paresthesia    Cervical radiculopathy    Obesity (BMI 30-39  9)    Abnormal blood sugar    Allergic rhinitis    Allergic reaction    Subclinical hypothyroidism       Objective   /80 (BP Location: Left arm, Patient Position: Sitting, Cuff Size: Large)   Pulse 66   Temp 97 7 °F (36 5 °C) (Tympanic)   Resp 17   Ht 5' 6 93" (1 7 m)   Wt 101 kg (222 lb 6 oz)   LMP  (LMP Unknown)   SpO2 99%   Breastfeeding? No   BMI 34 90 kg/m²     Physical Exam   Cardiovascular: Normal rate, regular rhythm, normal heart sounds and intact distal pulses  Carotids: no bruits  Ext: no edema   Pulmonary/Chest: Effort normal  No respiratory distress  She has no wheezes  She has no rales  Psychiatric: She has a normal mood and affect   Her behavior is normal  Thought content normal        Pertinent Laboratory/Diagnostic Studies:    Recent labs    Current Medications     Current Outpatient Prescriptions:     aspirin 81 MG tablet, Take 81 mg by mouth daily, Disp: , Rfl:     azelastine (ASTELIN) 0 1 % nasal spray, 2 sprays into each nostril daily Use in each nostril as directed, Disp: , Rfl:     Cholecalciferol (VITAMIN D-3 PO), Take 4,000 Units by mouth daily, Disp: , Rfl:     EPINEPHrine (EPIPEN) 0 3 mg/0 3 mL SOAJ, Inject 0 3 mL (0 3 mg total) into a muscle as needed for anaphylaxis (twin pack), Disp: 2 each, Rfl: 3    FLUTICASONE PROPIONATE, NASAL, NA, 2 sprays into each nostril as needed  , Disp: , Rfl:     loratadine (CLARITIN) 10 mg tablet, Take 10 mg by mouth daily, Disp: , Rfl:     Omega-3 Fatty Acids (FISH OIL PO), Take 2,000 mg by mouth 2 (two) times a day, Disp: , Rfl:     Red Yeast Rice Extract (RED YEAST RICE PO), Take 1,200 mg by mouth 2 (two) times a day, Disp: , Rfl:     levothyroxine 50 mcg tablet, Take 1 tablet (50 mcg total) by mouth daily, Disp: 90 tablet, Rfl: 0    Health Maintenance     Health Maintenance   Topic Date Due    Hepatitis C Screening  1960    DTaP,Tdap,and Td Vaccines (1 - Tdap) 04/27/1983    PT PLAN OF CARE  05/23/2018    INFLUENZA VACCINE  07/01/2018    Depression Screening PHQ  04/23/2019    CRC Screening: Colonoscopy  08/22/2023     Immunization History   Administered Date(s) Administered    Influenza 10/01/2016, 10/02/2017    Influenza Quadrivalent, 6-35 Months IM 10/02/2017    Influenza, Quadrivalent (nasal) 10/01/2016       Halima Waterman DO  [facility]

## 2019-01-07 NOTE — ASSESSMENT & PLAN NOTE
History of sinus bradycardia in syncopal episode  She is status post pacemaker insertion  Doing well without chest pain or shortness of breath  Still sees cardiologist yearly (Dr Malik Willoughby)

## 2019-01-07 NOTE — ASSESSMENT & PLAN NOTE
Patient has gained some weight since last visit  She is still it hearing to 1400 calorie diet and exercising regularly  But lately she has not been exercising as often due to ongoing fatigue  Will start levothyroxine today due to subclinical hypothyroid    Will continue to monitor weights

## 2019-01-07 NOTE — ASSESSMENT & PLAN NOTE
Cholesterol 213/131, HDL 67  (was 157/100)  May be worse due to subclinical hypothyroid  Recommend continue diet and exercise    Will continue to monitor

## 2019-01-07 NOTE — ASSESSMENT & PLAN NOTE
Patient has been complaining of ongoing fatigue, moodiness recently  TSH is 4 170  Will start levothyroxine 50 mcg daily  Will recheck TSH in approximately 2 months  (And again in 6 months)

## 2019-02-05 ENCOUNTER — REMOTE DEVICE CLINIC VISIT (OUTPATIENT)
Dept: CARDIOLOGY CLINIC | Facility: CLINIC | Age: 59
End: 2019-02-05
Payer: COMMERCIAL

## 2019-02-05 DIAGNOSIS — Z95.0 PRESENCE OF CARDIAC PACEMAKER: ICD-10-CM

## 2019-02-05 DIAGNOSIS — R55 SYNCOPE AND COLLAPSE: Primary | ICD-10-CM

## 2019-02-05 PROCEDURE — 93294 REM INTERROG EVL PM/LDLS PM: CPT | Performed by: INTERNAL MEDICINE

## 2019-02-05 PROCEDURE — 93296 REM INTERROG EVL PM/IDS: CPT | Performed by: INTERNAL MEDICINE

## 2019-02-05 NOTE — PROGRESS NOTES
Results for orders placed or performed in visit on 02/05/19   Cardiac EP device report    Narrative    MDT DUAL CHAMBER PM  CARELINK TRANSMISSION:  BATTERY VOLTAGE ADEQUATE (26 MOS)  AP: 25%  : 24 3%  ALL AVAILABLE LEAD PARAMETERS WITHIN NORMAL LIMITS  NO SIGNIFICANT HIGH RATE EPISODES (VHR ADDRESSED IN PREV REMOTE)  122 RATE DROPS NOTED OVER THE PAST 6 MO  PACEMAKER FUNCTIONING APPROPRIATELY    [address] [place] [address]

## 2019-03-15 ENCOUNTER — APPOINTMENT (OUTPATIENT)
Dept: LAB | Facility: HOSPITAL | Age: 59
End: 2019-03-15
Payer: COMMERCIAL

## 2019-03-15 DIAGNOSIS — E03.8 SUBCLINICAL HYPOTHYROIDISM: ICD-10-CM

## 2019-03-15 LAB — TSH SERPL DL<=0.05 MIU/L-ACNC: 1.19 UIU/ML (ref 0.36–3.74)

## 2019-03-15 PROCEDURE — 84443 ASSAY THYROID STIM HORMONE: CPT

## 2019-03-15 PROCEDURE — 36415 COLL VENOUS BLD VENIPUNCTURE: CPT

## 2019-03-27 DIAGNOSIS — E03.8 SUBCLINICAL HYPOTHYROIDISM: ICD-10-CM

## 2019-03-27 RX ORDER — LEVOTHYROXINE SODIUM 0.05 MG/1
50 TABLET ORAL DAILY
Qty: 90 TABLET | Refills: 1 | Status: SHIPPED | OUTPATIENT
Start: 2019-03-27 | End: 2019-09-27 | Stop reason: SDUPTHER

## 2019-04-10 ENCOUNTER — OFFICE VISIT (OUTPATIENT)
Dept: CARDIOLOGY CLINIC | Facility: CLINIC | Age: 59
End: 2019-04-10
Payer: COMMERCIAL

## 2019-04-10 VITALS
SYSTOLIC BLOOD PRESSURE: 128 MMHG | HEART RATE: 76 BPM | DIASTOLIC BLOOD PRESSURE: 80 MMHG | RESPIRATION RATE: 18 BRPM | BODY MASS INDEX: 36.07 KG/M2 | WEIGHT: 229.8 LBS | HEIGHT: 67 IN

## 2019-04-10 DIAGNOSIS — R00.1 SINUS BRADYCARDIA: Primary | ICD-10-CM

## 2019-04-10 DIAGNOSIS — Z95.0 PACEMAKER: ICD-10-CM

## 2019-04-10 DIAGNOSIS — E78.2 MIXED HYPERLIPIDEMIA: ICD-10-CM

## 2019-04-10 PROCEDURE — 99214 OFFICE O/P EST MOD 30 MIN: CPT | Performed by: INTERNAL MEDICINE

## 2019-04-11 ENCOUNTER — TRANSCRIBE ORDERS (OUTPATIENT)
Dept: ADMINISTRATIVE | Facility: HOSPITAL | Age: 59
End: 2019-04-11

## 2019-04-11 DIAGNOSIS — Z12.39 BREAST SCREENING, UNSPECIFIED: Primary | ICD-10-CM

## 2019-04-30 ENCOUNTER — ANNUAL EXAM (OUTPATIENT)
Dept: OBGYN CLINIC | Facility: CLINIC | Age: 59
End: 2019-04-30
Payer: COMMERCIAL

## 2019-04-30 VITALS
WEIGHT: 231.4 LBS | SYSTOLIC BLOOD PRESSURE: 142 MMHG | DIASTOLIC BLOOD PRESSURE: 80 MMHG | HEIGHT: 66 IN | BODY MASS INDEX: 37.19 KG/M2

## 2019-04-30 DIAGNOSIS — E66.9 OBESITY (BMI 35.0-39.9 WITHOUT COMORBIDITY): ICD-10-CM

## 2019-04-30 DIAGNOSIS — Z01.419 WOMEN'S ANNUAL ROUTINE GYNECOLOGICAL EXAMINATION: ICD-10-CM

## 2019-04-30 DIAGNOSIS — Z90.710 STATUS POST LAPAROSCOPIC HYSTERECTOMY: ICD-10-CM

## 2019-04-30 DIAGNOSIS — Z12.39 BREAST CANCER SCREENING: Primary | ICD-10-CM

## 2019-04-30 DIAGNOSIS — E03.9 HYPOTHYROIDISM (ACQUIRED): ICD-10-CM

## 2019-04-30 DIAGNOSIS — Z87.42 HISTORY OF ENDOMETRIOSIS: ICD-10-CM

## 2019-04-30 DIAGNOSIS — E28.2 PCOS (POLYCYSTIC OVARIAN SYNDROME): ICD-10-CM

## 2019-04-30 DIAGNOSIS — Z90.79 STATUS POST BILATERAL SALPINGECTOMY: ICD-10-CM

## 2019-04-30 PROCEDURE — 99396 PREV VISIT EST AGE 40-64: CPT | Performed by: OBSTETRICS & GYNECOLOGY

## 2019-05-07 ENCOUNTER — REMOTE DEVICE CLINIC VISIT (OUTPATIENT)
Dept: CARDIOLOGY CLINIC | Facility: CLINIC | Age: 59
End: 2019-05-07
Payer: COMMERCIAL

## 2019-05-07 DIAGNOSIS — Z95.0 CARDIAC PACEMAKER IN SITU: ICD-10-CM

## 2019-05-07 DIAGNOSIS — R55 SYNCOPE AND COLLAPSE: Primary | ICD-10-CM

## 2019-05-07 PROCEDURE — 93294 REM INTERROG EVL PM/LDLS PM: CPT | Performed by: INTERNAL MEDICINE

## 2019-05-07 PROCEDURE — 93296 REM INTERROG EVL PM/IDS: CPT | Performed by: INTERNAL MEDICINE

## 2019-05-16 ENCOUNTER — HOSPITAL ENCOUNTER (OUTPATIENT)
Dept: MAMMOGRAPHY | Facility: MEDICAL CENTER | Age: 59
Discharge: HOME | End: 2019-05-16
Payer: COMMERCIAL

## 2019-05-16 VITALS — WEIGHT: 230 LBS | BODY MASS INDEX: 36.1 KG/M2 | HEIGHT: 67 IN

## 2019-05-16 DIAGNOSIS — Z12.39 BREAST SCREENING, UNSPECIFIED: ICD-10-CM

## 2019-05-16 PROCEDURE — 77067 SCR MAMMO BI INCL CAD: CPT

## 2019-05-16 PROCEDURE — 77063 BREAST TOMOSYNTHESIS BI: CPT

## 2019-07-12 ENCOUNTER — APPOINTMENT (OUTPATIENT)
Dept: LAB | Facility: MEDICAL CENTER | Age: 59
End: 2019-07-12
Payer: COMMERCIAL

## 2019-07-12 DIAGNOSIS — E03.8 SUBCLINICAL HYPOTHYROIDISM: ICD-10-CM

## 2019-07-12 DIAGNOSIS — R73.09 ABNORMAL BLOOD SUGAR: ICD-10-CM

## 2019-07-12 DIAGNOSIS — E78.2 MIXED HYPERLIPIDEMIA: ICD-10-CM

## 2019-07-12 LAB
ALBUMIN SERPL BCP-MCNC: 3.6 G/DL (ref 3.5–5)
ALP SERPL-CCNC: 91 U/L (ref 46–116)
ALT SERPL W P-5'-P-CCNC: 23 U/L (ref 12–78)
ANION GAP SERPL CALCULATED.3IONS-SCNC: 4 MMOL/L (ref 4–13)
AST SERPL W P-5'-P-CCNC: 14 U/L (ref 5–45)
BILIRUB SERPL-MCNC: 0.35 MG/DL (ref 0.2–1)
BUN SERPL-MCNC: 25 MG/DL (ref 5–25)
CALCIUM SERPL-MCNC: 9 MG/DL (ref 8.3–10.1)
CHLORIDE SERPL-SCNC: 105 MMOL/L (ref 100–108)
CHOLEST SERPL-MCNC: 203 MG/DL (ref 50–200)
CO2 SERPL-SCNC: 28 MMOL/L (ref 21–32)
CREAT SERPL-MCNC: 1.02 MG/DL (ref 0.6–1.3)
EST. AVERAGE GLUCOSE BLD GHB EST-MCNC: 114 MG/DL
GFR SERPL CREATININE-BSD FRML MDRD: 61 ML/MIN/1.73SQ M
GLUCOSE P FAST SERPL-MCNC: 95 MG/DL (ref 65–99)
HBA1C MFR BLD: 5.6 % (ref 4.2–6.3)
HDLC SERPL-MCNC: 49 MG/DL (ref 40–60)
LDLC SERPL CALC-MCNC: 134 MG/DL (ref 0–100)
POTASSIUM SERPL-SCNC: 4.1 MMOL/L (ref 3.5–5.3)
PROT SERPL-MCNC: 7.4 G/DL (ref 6.4–8.2)
SODIUM SERPL-SCNC: 137 MMOL/L (ref 136–145)
TRIGL SERPL-MCNC: 98 MG/DL
TSH SERPL DL<=0.05 MIU/L-ACNC: 2.15 UIU/ML (ref 0.36–3.74)

## 2019-07-12 PROCEDURE — 80061 LIPID PANEL: CPT

## 2019-07-12 PROCEDURE — 83036 HEMOGLOBIN GLYCOSYLATED A1C: CPT

## 2019-07-12 PROCEDURE — 80053 COMPREHEN METABOLIC PANEL: CPT

## 2019-07-12 PROCEDURE — 84443 ASSAY THYROID STIM HORMONE: CPT

## 2019-07-12 PROCEDURE — 36415 COLL VENOUS BLD VENIPUNCTURE: CPT

## 2019-07-17 ENCOUNTER — OFFICE VISIT (OUTPATIENT)
Dept: FAMILY MEDICINE CLINIC | Facility: CLINIC | Age: 59
End: 2019-07-17
Payer: COMMERCIAL

## 2019-07-17 VITALS
BODY MASS INDEX: 36.81 KG/M2 | WEIGHT: 234.5 LBS | HEART RATE: 67 BPM | SYSTOLIC BLOOD PRESSURE: 122 MMHG | OXYGEN SATURATION: 98 % | TEMPERATURE: 98.6 F | HEIGHT: 67 IN | DIASTOLIC BLOOD PRESSURE: 76 MMHG | RESPIRATION RATE: 17 BRPM

## 2019-07-17 DIAGNOSIS — R00.1 SINUS BRADYCARDIA: ICD-10-CM

## 2019-07-17 DIAGNOSIS — R73.09 ABNORMAL BLOOD SUGAR: ICD-10-CM

## 2019-07-17 DIAGNOSIS — E66.9 OBESITY (BMI 30-39.9): Primary | ICD-10-CM

## 2019-07-17 DIAGNOSIS — E03.8 SUBCLINICAL HYPOTHYROIDISM: ICD-10-CM

## 2019-07-17 DIAGNOSIS — E55.9 VITAMIN D DEFICIENCY: ICD-10-CM

## 2019-07-17 DIAGNOSIS — E78.2 MIXED HYPERLIPIDEMIA: ICD-10-CM

## 2019-07-17 PROCEDURE — 1036F TOBACCO NON-USER: CPT | Performed by: FAMILY MEDICINE

## 2019-07-17 PROCEDURE — 99214 OFFICE O/P EST MOD 30 MIN: CPT | Performed by: FAMILY MEDICINE

## 2019-07-17 PROCEDURE — 3008F BODY MASS INDEX DOCD: CPT | Performed by: FAMILY MEDICINE

## 2019-07-17 NOTE — ASSESSMENT & PLAN NOTE
Cholesterol 203/134, HDL 49  Patient has been watching her diet and exercising    Will continue to monitor

## 2019-07-17 NOTE — PROGRESS NOTES
[facility]      NAME: Rere Israel  AGE: 64 y o  SEX: female  : 1960   MRN: [id number]    DATE: 2019  TIME: 4:00 PM    Assessment and Plan     Problem List Items Addressed This Visit     Mixed hyperlipidemia     Cholesterol 203/134, HDL 49  Patient has been watching her diet and exercising  Will continue to monitor           Relevant Orders    Lipid Panel with Direct LDL reflex    Sinus bradycardia     History of bradycardia and syncopal episode  Status post permanent pacemaker insertion  Continue regular yearly follow-up with her cardiologist, Dr Blanco Wylie         Obesity (BMI 30-39 9) - Primary     Continue diet and exercise  Will continue to monitor         Abnormal blood sugar     Blood sugar 95, A1c 5 6%  Continue reduced carb diet and exercise         Relevant Orders    Comprehensive metabolic panel    Subclinical hypothyroidism     TSH 2 1  Doing well on levothyroxine 50         Relevant Orders    TSH, 3rd generation with Free T4 reflex    Vitamin D deficiency     Doing well on OTC vitamin-D 2000 International Units daily  Will check vitamin-D level prior to next appointment in 6 months         Relevant Orders    Vitamin D 25 hydroxy              Return to office in:  6 months, fasting blood work prior at [facility] Financial Complaint   Patient presents with    Follow-up     6m check up to chronic conditions and to discuss blood from 19       History of Present Illness     Patient presents for recheck chronic medical problems today  Overall she is feeling well  She has been trying to watch her diet and exercise regularly  Doing well on levothyroxine 50  Still sees cardiologist regularly regarding her pacemaker    Last labs were done on        The following portions of the patient's history were reviewed and updated as appropriate: allergies, current medications, past family history, past medical history, past social history, past surgical history and problem list     Review of Systems   Review of Systems   Respiratory: Negative  Cardiovascular: Negative  Gastrointestinal: Negative  Genitourinary: Negative  Active Problem List     Patient Active Problem List   Diagnosis    Mixed hyperlipidemia    Sinus bradycardia    Left hand paresthesia    Cervical radiculopathy    Obesity (BMI 30-39  9)    Abnormal blood sugar    Allergic rhinitis    Allergic reaction    Subclinical hypothyroidism    Pacemaker    Vitamin D deficiency       Objective   /76 (BP Location: Left arm, Patient Position: Sitting, Cuff Size: Large)   Pulse 67   Temp 98 6 °F (37 °C) (Tympanic)   Resp 17   Ht 5' 7" (1 702 m)   Wt 106 kg (234 lb 8 oz)   LMP  (LMP Unknown)   SpO2 98%   Breastfeeding? No   BMI 36 73 kg/m²     Physical Exam   Cardiovascular: Normal rate, regular rhythm, normal heart sounds and intact distal pulses  Carotids: no bruits  Ext: no edema   Pulmonary/Chest: Effort normal  No respiratory distress  She has no wheezes  She has no rales  Psychiatric: She has a normal mood and affect   Her behavior is normal  Thought content normal        Pertinent Laboratory/Diagnostic Studies:  Lab results July 12th    Current Medications     Current Outpatient Medications:     aspirin 81 MG tablet, Take 81 mg by mouth daily, Disp: , Rfl:     azelastine (ASTELIN) 0 1 % nasal spray, 2 sprays into each nostril daily Use in each nostril as directed, Disp: , Rfl:     Cholecalciferol (VITAMIN D-3 PO), Take 4,000 Units by mouth daily, Disp: , Rfl:     EPINEPHrine (EPIPEN) 0 3 mg/0 3 mL SOAJ, Inject 0 3 mL (0 3 mg total) into a muscle as needed for anaphylaxis (twin pack), Disp: 2 each, Rfl: 3    FLUTICASONE PROPIONATE, NASAL, NA, 2 sprays into each nostril as needed  , Disp: , Rfl:     levothyroxine 50 mcg tablet, Take 1 tablet (50 mcg total) by mouth daily, Disp: 90 tablet, Rfl: 1    loratadine (CLARITIN) 10 mg tablet, Take 10 mg by mouth daily, Disp: , Rfl:     Omega-3 Fatty Acids (FISH OIL PO), Take 2,000 mg by mouth 2 (two) times a day, Disp: , Rfl:     Red Yeast Rice Extract (RED YEAST RICE PO), Take 1,200 mg by mouth 2 (two) times a day, Disp: , Rfl:     Health Maintenance     Health Maintenance   Topic Date Due    Hepatitis C Screening  1960    BMI: Followup Plan  04/27/1980    DTaP,Tdap,and Td Vaccines (1 - Tdap) 04/27/1983    PT PLAN OF CARE  05/23/2018    INFLUENZA VACCINE  09/17/2019 (Originally 7/1/2019)    MAMMOGRAM  05/16/2020    Depression Screening PHQ  07/17/2020    BMI: Adult  07/17/2020    CRC Screening: Colonoscopy  08/22/2023    Pneumococcal Vaccine: 65+ Years (1 of 2 - PCV13) 04/27/2027    Pneumococcal Vaccine: Pediatrics (0 to 5 Years) and At-Risk Patients (6 to 59 Years)  Aged Out    HEPATITIS B VACCINES  Aged [employer] History   Administered Date(s) Administered    INFLUENZA 10/01/2016, 10/02/2017    Influenza Quadrivalent, 6-35 Months IM 10/02/2017    Influenza, Quadrivalent (nasal) 10/01/2016       Humberto Rosenbaum DO  [facility]

## 2019-07-17 NOTE — ASSESSMENT & PLAN NOTE
History of bradycardia and syncopal episode  Status post permanent pacemaker insertion    Continue regular yearly follow-up with her cardiologist, Dr Galindo Route

## 2019-07-17 NOTE — ASSESSMENT & PLAN NOTE
Doing well on OTC vitamin-D 2000 International Units daily    Will check vitamin-D level prior to next appointment in 6 months

## 2019-08-14 ENCOUNTER — REMOTE DEVICE CLINIC VISIT (OUTPATIENT)
Dept: CARDIOLOGY CLINIC | Facility: CLINIC | Age: 59
End: 2019-08-14
Payer: COMMERCIAL

## 2019-08-14 DIAGNOSIS — Z95.0 CARDIAC PACEMAKER IN SITU: Primary | ICD-10-CM

## 2019-08-14 PROCEDURE — 93294 REM INTERROG EVL PM/LDLS PM: CPT | Performed by: INTERNAL MEDICINE

## 2019-08-14 PROCEDURE — 93296 REM INTERROG EVL PM/IDS: CPT | Performed by: INTERNAL MEDICINE

## 2019-08-14 NOTE — PROGRESS NOTES
MDT DUAL CHAMBER PM  CARELINK TRANSMISSION: BATTERY VOLTAGE ADEQUATE (~19 MOS)  AP 23%  0 9%  ALL AVAILABLE LEAD PARAMETERS WITHIN NORMAL LIMITS  1 AHR EPISODE, DURATION 5 SEC W/MARKERS SHOWING PAT  NO OTHER SIGNIFICANT HIGH RATE EPISODES  >254 RATE DROP RESPONSE EPISODES  NORMAL DEVICE FUNCTION      EB

## 2019-09-27 ENCOUNTER — APPOINTMENT (OUTPATIENT)
Dept: RADIOLOGY | Facility: CLINIC | Age: 59
End: 2019-09-27
Payer: COMMERCIAL

## 2019-09-27 ENCOUNTER — OFFICE VISIT (OUTPATIENT)
Dept: FAMILY MEDICINE CLINIC | Facility: CLINIC | Age: 59
End: 2019-09-27
Payer: COMMERCIAL

## 2019-09-27 VITALS
WEIGHT: 239.2 LBS | HEIGHT: 67 IN | OXYGEN SATURATION: 98 % | BODY MASS INDEX: 37.54 KG/M2 | DIASTOLIC BLOOD PRESSURE: 74 MMHG | SYSTOLIC BLOOD PRESSURE: 132 MMHG | HEART RATE: 61 BPM | TEMPERATURE: 98.1 F

## 2019-09-27 DIAGNOSIS — M79.641 HAND PAIN, RIGHT: Primary | ICD-10-CM

## 2019-09-27 DIAGNOSIS — T78.40XS ALLERGIC REACTION, SEQUELA: ICD-10-CM

## 2019-09-27 DIAGNOSIS — M79.641 HAND PAIN, RIGHT: ICD-10-CM

## 2019-09-27 DIAGNOSIS — E03.8 SUBCLINICAL HYPOTHYROIDISM: ICD-10-CM

## 2019-09-27 PROCEDURE — 99213 OFFICE O/P EST LOW 20 MIN: CPT | Performed by: FAMILY MEDICINE

## 2019-09-27 PROCEDURE — 73130 X-RAY EXAM OF HAND: CPT

## 2019-09-27 PROCEDURE — 3008F BODY MASS INDEX DOCD: CPT | Performed by: FAMILY MEDICINE

## 2019-09-27 RX ORDER — LEVOTHYROXINE SODIUM 0.05 MG/1
50 TABLET ORAL DAILY
Qty: 90 TABLET | Refills: 1 | Status: SHIPPED | OUTPATIENT
Start: 2019-09-27 | End: 2020-01-16 | Stop reason: SDUPTHER

## 2019-09-27 RX ORDER — EPINEPHRINE 0.3 MG/.3ML
0.3 INJECTION SUBCUTANEOUS AS NEEDED
Qty: 2 EACH | Refills: 3 | Status: SHIPPED | OUTPATIENT
Start: 2019-09-27 | End: 2019-10-01 | Stop reason: SDUPTHER

## 2019-09-27 RX ORDER — MELOXICAM 15 MG/1
15 TABLET ORAL DAILY
Qty: 30 TABLET | Refills: 0 | Status: SHIPPED | OUTPATIENT
Start: 2019-09-27 | End: 2019-11-27 | Stop reason: ALTCHOICE

## 2019-09-27 NOTE — ASSESSMENT & PLAN NOTE
2 month hx of pain/swelling 2nd MCP joint  No recent trauma  No morning stiffness  no other arthralgias  Examination reveals inflammation of 2nd MCP joint  Otherwise normal exam   Will sent for x-rays of right hand  Will also give Rx for meloxicam 15 mg daily as needed  Call with results  Possible refer to physical therapy    If symptoms persist, consider laboratory arthritis workup

## 2019-09-27 NOTE — PROGRESS NOTES
[facility]      NAME: Leisa De  AGE: 64 y o  SEX: female  : 1960   MRN: [id number]    DATE: 2019  TIME: 3:35 PM    Assessment and Plan     Problem List Items Addressed This Visit     Allergic reaction    Relevant Medications    EPINEPHrine (EPIPEN) 0 3 mg/0 3 mL SOAJ    meloxicam (MOBIC) 15 mg tablet    Subclinical hypothyroidism    Relevant Medications    levothyroxine 50 mcg tablet    Hand pain, right - Primary     2 month hx of pain/swelling 2nd MCP joint  No recent trauma  No morning stiffness  no other arthralgias  Examination reveals inflammation of 2nd MCP joint  Otherwise normal exam   Will sent for x-rays of right hand  Will also give Rx for meloxicam 15 mg daily as needed  Call with results  Possible refer to physical therapy  If symptoms persist, consider laboratory arthritis workup           Relevant Medications    meloxicam (MOBIC) 15 mg tablet    Other Relevant Orders    XR hand 3+ vw right              Return to office in:  Will call with x-ray results    Chief Complaint     Chief Complaint   Patient presents with    other     Pt c/o Right knuckle and index finger swollen for about 2 weeks now       History of Present Illness     2 month hx of pain/swelling 2nd MCP joint  No recent trauma  No morning stiffness  no other arthralgias  The following portions of the patient's history were reviewed and updated as appropriate: allergies, current medications, past family history, past medical history, past social history, past surgical history and problem list     Review of Systems   Review of Systems   Respiratory: Negative  Cardiovascular: Negative  Gastrointestinal: Negative  Genitourinary: Negative  Musculoskeletal: Positive for arthralgias  Active Problem List     Patient Active Problem List   Diagnosis    Mixed hyperlipidemia    Sinus bradycardia    Left hand paresthesia    Cervical radiculopathy    Obesity (BMI 30-39  9)    Abnormal blood sugar    Allergic rhinitis    Allergic reaction    Subclinical hypothyroidism    Pacemaker    Vitamin D deficiency    Hand pain, right       Objective   /74 (BP Location: Left arm, Patient Position: Sitting, Cuff Size: Adult)   Pulse 61   Temp 98 1 °F (36 7 °C) (Tympanic)   Ht 5' 6 93" (1 7 m)   Wt 109 kg (239 lb 3 2 oz)   LMP  (LMP Unknown)   SpO2 98%   BMI 37 54 kg/m²     Physical Exam    Pertinent Laboratory/Diagnostic Studies:   none    Current Medications     Current Outpatient Medications:     aspirin 81 MG tablet, Take 81 mg by mouth daily, Disp: , Rfl:     azelastine (ASTELIN) 0 1 % nasal spray, 2 sprays into each nostril daily Use in each nostril as directed, Disp: , Rfl:     Cholecalciferol (VITAMIN D-3 PO), Take 4,000 Units by mouth daily, Disp: , Rfl:     EPINEPHrine (EPIPEN) 0 3 mg/0 3 mL SOAJ, Inject 0 3 mL (0 3 mg total) into a muscle as needed for anaphylaxis (twin pack), Disp: 2 each, Rfl: 3    FLUTICASONE PROPIONATE, NASAL, NA, 2 sprays into each nostril as needed  , Disp: , Rfl:     levothyroxine 50 mcg tablet, Take 1 tablet (50 mcg total) by mouth daily, Disp: 90 tablet, Rfl: 1    loratadine (CLARITIN) 10 mg tablet, Take 10 mg by mouth daily, Disp: , Rfl:     Omega-3 Fatty Acids (FISH OIL PO), Take 2,000 mg by mouth 2 (two) times a day, Disp: , Rfl:     Red Yeast Rice Extract (RED YEAST RICE PO), Take 1,200 mg by mouth 2 (two) times a day, Disp: , Rfl:     meloxicam (MOBIC) 15 mg tablet, Take 1 tablet (15 mg total) by mouth daily, Disp: 30 tablet, Rfl: 0    Health Maintenance     Health Maintenance   Topic Date Due    Hepatitis C Screening  1960    BMI: Followup Plan  04/27/1980    DTaP,Tdap,and Td Vaccines (1 - Tdap) 04/27/1983    PT PLAN OF CARE  05/23/2018    INFLUENZA VACCINE  07/01/2019    MAMMOGRAM  05/16/2020    Depression Screening PHQ  07/17/2020    BMI: Adult  07/17/2020    CRC Screening: Colonoscopy  08/22/2023    Pneumococcal Vaccine: 65+ Years (1 of 2 - PCV13) 04/27/2027    Pneumococcal Vaccine: Pediatrics (0 to 5 Years) and At-Risk Patients (6 to 59 Years)  Aged Out    HEPATITIS B VACCINES  Aged [employer] History   Administered Date(s) Administered    INFLUENZA 10/01/2016, 10/02/2017    Influenza Quadrivalent, 6-35 Months IM 10/02/2017    Influenza, Quadrivalent (nasal) 10/01/2016       Salvador Deleon DO  [facility]

## 2019-10-01 DIAGNOSIS — T78.40XS ALLERGIC REACTION, SEQUELA: ICD-10-CM

## 2019-10-01 RX ORDER — EPINEPHRINE 0.3 MG/.3ML
0.3 INJECTION SUBCUTANEOUS AS NEEDED
Qty: 2 EACH | Refills: 3 | Status: SHIPPED | OUTPATIENT
Start: 2019-10-01 | End: 2021-01-18 | Stop reason: SDUPTHER

## 2019-10-03 DIAGNOSIS — M79.641 HAND PAIN, RIGHT: Primary | ICD-10-CM

## 2019-10-17 ENCOUNTER — EVALUATION (OUTPATIENT)
Dept: PHYSICAL THERAPY | Facility: MEDICAL CENTER | Age: 59
End: 2019-10-17
Payer: COMMERCIAL

## 2019-10-17 DIAGNOSIS — M79.641 HAND PAIN, RIGHT: ICD-10-CM

## 2019-10-17 PROCEDURE — 97161 PT EVAL LOW COMPLEX 20 MIN: CPT | Performed by: PHYSICAL THERAPIST

## 2019-10-17 PROCEDURE — 97110 THERAPEUTIC EXERCISES: CPT | Performed by: PHYSICAL THERAPIST

## 2019-10-17 NOTE — PROGRESS NOTES
PT Evaluation     Today's date: 10/17/2019  Patient name: Juan Jose Kraus  : 1960  MRN: [id number]  Referring provider: Andrew Jalloh DO  Dx:   Encounter Diagnosis     ICD-10-CM    1  Hand pain, right M79 641 Ambulatory referral to Physical Therapy                  Assessment  Assessment details: Ms Norma Mckeon is a pleasant 64 y o  RHD Female who presents today for physical therapy evaluation for insidious onset swelling and pain of the dorsal aspect of the index and long fingers of the right hand  No further referral appears necessary at this time based upon examination findings  Patient presents with signs and symptoms consistent with a lumbrical strain  She demonstrates mild swelling and tenderness around the insertion of the lumbricals on the 2nd and 3rd digits at the dorsal expansion  Patient presents with intrinsic muscle tightness and decreased lateral pinch strength  Patient will benefit from outpatient physical therapy services to address the observed impairments to improve tolerance to activities such as sewing  Prognosis is good given compliance with PT attendance and HEP performance  Please contact me with any questions  Thank you for the referral    Impairments: abnormal or restricted ROM, activity intolerance, lacks appropriate home exercise program and pain with function  Understanding of Dx/Px/POC: good   Prognosis: good    Goals  1  Patient will be independent in individualized HEP  2  Patient will improve lumbrical muscle length to WNL compared to contralateral side  3  Patient will improve lateral pinch strength to WNL compared to contralateral side  4  Patient will achieve functional outcome score by MDIC on FOTO  5  Patient will be able to sew without limitation due to pain         Plan  Patient would benefit from: skilled physical therapy  Planned therapy interventions: manual therapy, neuromuscular re-education, strengthening, stretching, therapeutic activities, therapeutic exercise, patient education, home exercise program and functional ROM exercises  Frequency: 1-2x/week  Duration in weeks: 8  Plan of Care beginning date: 10/17/2019  Plan of Care expiration date: 12/6/2019  Treatment plan discussed with: patient        Subjective Evaluation    History of Present Illness  Mechanism of injury: Ms Dobbs Monday is a 64 y o  Female who presents today with reports of insidious onset swelling of the right hand  She reports prior to the incident 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 in addition to moving furniture  She states in September she started to notice swelling on the dorsal aspect of the hand at the inde finger  She followed up with her PCP and was prescribed meloxicam with significant relief  She states she recently started a sewing project of 500 scarves in which the pain and swelling has returned  She denies any pain or stiffness in the morning, pain increases after repetitive activity such as keyboarding, sewing, grasping  She reports mild loss of strength, specifically with pinching and pulling  Denies any numbness/tingling or clumsiness  [place] reports her biggest concern is fear of losing function of her hand  She states her father does have a history of RA  She received x-rays which were normal but no blood work at this time  Pain  Current pain rating: 3  At best pain rating: 3  At worst pain rating: 10  Pain location: dorsal aspect of MCP joint of index finger  Quality: dull ache and sharp  Relieving factors: medications (topical cream)  Exacerbated by: grasping, holding a pencil, brushing teeth, sewing, pinching, pulling  Social Support    Employment status: working (director of neuromedical services)  Hand dominance: right      Diagnostic Tests  X-ray: normal  Treatments  No previous or current treatments        Objective     Observations     Right Wrist/Hand   Positive for edema   Negative for atrophy, Albert's nodes, Heberden's nodes, swan neck deformity and ulnar deviation  Additional Observation Details  Mild edema index finger and long finger at MCP     Palpation     Right   Tenderness of the intrinsics  Left Wrist/Hand Comments  Left intrinsics: 1st and 2nd digits  Tenderness     Right Wrist/Hand   No tenderness in the first dorsal compartment, second dorsal compartment, third dorsal compartment and common extensor tendon       Neurological Testing     Sensation     Wrist/Hand     Right   Intact: light touch    Active Range of Motion     Left Thumb   Opposition: Full active composite fist bilaterally    Passive Range of Motion     Additional Passive Range of Motion Details  Extrinsic flexors/extensors WNL bilaterally  Lumbrical tightness digits 2-4 of right hand  Passive MCP, IP flexion WNL of all digits  Good excursion of extensor tendons into composite flexion    Strength/Myotome Testing     Left Wrist/Hand   Normal wrist strength     (2nd hand position)     Trial 1: 80    Thumb Strength  Key/Lateral Pinch     Trail 1: 18  Tip/Two-Point Pinch     Trial 1: 9    Right Wrist/Hand   Normal wrist strength     (2nd hand position)     Trial 1: 78    Thumb Strength   Key/Lateral Pinch     Trial 1: 15  Tip/Two-Point Pinch     Trial 1: 9    Additional Strength Details  FDP, FDS 5/5  Dorsal/palmar interossei 5/5      Swelling     Left Wrist/Hand   Index     Proximal: 7 cm    Middle: 6 cm  Middle     Proximal: 7 cm    Middle: 6 2 cm  Circumference MCP: 20 5 cm    Right Wrist/Hand   Index     Proximal: 6 8 cm    Middle: 5 8 cm  Middle     Proximal: 6 6 cm    Middle: 6 2 cm  Circumference MCP: 20 5 cm      Flowsheet Rows      Most Recent Value   PT/OT G-Codes   Current Score  67   Projected Score  75   FOTO information reviewed  Yes             Precautions: pacemaker, hx of L RTC repair,       Manual  10/17            Intrinsic stretching             STM                                                        Exercise Diary  10/17 Hook fist             Puppet hand             Putty press puppet hand             Clothe pins                                                                                                                                                                                      HEP: intrinsic stretching, TGE's x8'                                          Modalities

## 2019-10-18 ENCOUNTER — IN-CLINIC DEVICE VISIT (OUTPATIENT)
Dept: CARDIOLOGY CLINIC | Facility: CLINIC | Age: 59
End: 2019-10-18
Payer: COMMERCIAL

## 2019-10-18 DIAGNOSIS — Z95.0 CARDIAC PACEMAKER IN SITU: Primary | ICD-10-CM

## 2019-10-18 PROCEDURE — 93280 PM DEVICE PROGR EVAL DUAL: CPT | Performed by: INTERNAL MEDICINE

## 2019-10-18 NOTE — PROGRESS NOTES
Results for orders placed or performed in visit on 10/18/19   Cardiac EP device report    Narrative    MDT DUAL CHAMBER PM  DEVICE INTERROGATED IN THE [place] OFFICE  BATTERY VOLTAGE ADEQUATE (~ 14 MOS)  AP 23%  1%  ALL LEAD PARAMETERS WITHIN NORMAL LIMITS  ALL OTHER TESTING WITHIN NORMAL LIMITS  NO NEW SIGNIFICANT HIGH RATE EPISODES  >245 RATE DROP EPISODES, NO EGRMS AVAILABLE FOR REVIEW  HX OF SAME  NO PROGRAMMING CHANGES MADE TO DEVICE PARAMETERS  PACEMAKER FUNCTIONING APPROPRIATELY      EB

## 2019-11-05 ENCOUNTER — EVALUATION (OUTPATIENT)
Dept: PHYSICAL THERAPY | Facility: MEDICAL CENTER | Age: 59
End: 2019-11-05
Payer: COMMERCIAL

## 2019-11-05 DIAGNOSIS — M79.641 HAND PAIN, RIGHT: Primary | ICD-10-CM

## 2019-11-05 PROCEDURE — 97110 THERAPEUTIC EXERCISES: CPT | Performed by: PHYSICAL THERAPIST

## 2019-11-05 PROCEDURE — 97140 MANUAL THERAPY 1/> REGIONS: CPT | Performed by: PHYSICAL THERAPIST

## 2019-11-05 NOTE — PROGRESS NOTES
Daily Note     Today's date: 2019  Patient name: Titi Guerra  : 1960  MRN: [id number]  Referring provider: Leah Moncada DO  Dx:   Encounter Diagnosis     ICD-10-CM    1  Hand pain, right M79 641                   Subjective: patient states she felt good initially following initial evaluation with her stretches, however she was away on vacation and she states her pain and swelling returned  Objective: See treatment diary below      Assessment:   Intrinsic tightness persists- positive response to manual interventions  Focused on AROM and intrinsic strengthening with light resistance  Difficulty with hook fist (intrinsic plus position)  Patient educated in minimizing prolonged use of index finger with phone  Patient provided with foam for updated HEP to continue working on intrinsic function        Plan: Continue per plan of care  Progress treatment as tolerated         Precautions: pacemaker, hx of L RTC repair,       Manual  111/5            Intrinsic stretching x5'            STM x5'                                                       Exercise Diary  11            Hook fist 2x10            Puppet hand 2x10            Hook fist pegs 1 round            theraball puttethand x20            Putty press puppet hand                                                                                                                                                                                                   HEP: intrinsic stretching, TGE's x8'                                          Modalities

## 2019-11-13 ENCOUNTER — OFFICE VISIT (OUTPATIENT)
Dept: PHYSICAL THERAPY | Facility: MEDICAL CENTER | Age: 59
End: 2019-11-13
Payer: COMMERCIAL

## 2019-11-13 DIAGNOSIS — M79.641 HAND PAIN, RIGHT: Primary | ICD-10-CM

## 2019-11-13 PROCEDURE — 97140 MANUAL THERAPY 1/> REGIONS: CPT | Performed by: PHYSICAL THERAPIST

## 2019-11-13 PROCEDURE — 97112 NEUROMUSCULAR REEDUCATION: CPT | Performed by: PHYSICAL THERAPIST

## 2019-11-13 PROCEDURE — 97110 THERAPEUTIC EXERCISES: CPT | Performed by: PHYSICAL THERAPIST

## 2019-11-13 NOTE — PROGRESS NOTES
Daily Note     Today's date: 2019  Patient name: Liza Zamora  : 1960  MRN: [id number]  Referring provider: Bala Higginbotham DO  Dx:   Encounter Diagnosis     ICD-10-CM    1  Hand pain, right M79 641                   Subjective: Patient reports good and bad days  She denies any swelling  She reports symptoms persist with activities such as peeling potatoes or sewing/pinning garments  Objective: See treatment diary below      Assessment:   Intrinsic tightness at 3rd digit only  Improved instrinsic minusposition  Gentle strengthening in instrinsic plus position- patient provided with foam to work on at home including MCP flexion and IP adduciton  Trialed KT to 2nd finger for stability at MCP joint- monitor response nv  Plan: Continue per plan of care  Progress treatment as tolerated         Precautions: pacemaker, hx of L RTC repair,       Manual             Intrinsic stretching x5' x5'           STM x5' x5'                                                      Exercise Diary             Hook fist 2x10            Puppet hand 2x10 2x10           Hook fist pegs 1 round 1  round           theraball puttethand x20 x20           Putty press puppet hand  x2' yellow           Rubber band spread  yellow x1 round                                                                                                                                                                                    HEP: intrinsic stretching, TGE's x8'                                          Modalities              KT  MCP 2nd digit

## 2019-11-21 ENCOUNTER — OFFICE VISIT (OUTPATIENT)
Dept: PHYSICAL THERAPY | Facility: MEDICAL CENTER | Age: 59
End: 2019-11-21
Payer: COMMERCIAL

## 2019-11-21 DIAGNOSIS — M79.641 HAND PAIN, RIGHT: Primary | ICD-10-CM

## 2019-11-21 PROCEDURE — 97140 MANUAL THERAPY 1/> REGIONS: CPT | Performed by: PHYSICAL THERAPIST

## 2019-11-21 PROCEDURE — 97110 THERAPEUTIC EXERCISES: CPT | Performed by: PHYSICAL THERAPIST

## 2019-11-21 NOTE — PROGRESS NOTES
Daily Note     Today's date: 2019  Patient name: Rosa Isela Garduno  : 1960  MRN: [id number]  Referring provider: Yimi Scanlon DO  Dx:   Encounter Diagnosis     ICD-10-CM    1  Hand pain, right M79 641                   Subjective: Patient reports with the combination of the tape and the new exercises she reports significant improvement  She states she has been able to hem pants, including pulling pins, and peel vegetables with out any discomfort  Objective: See treatment diary below    Outcome measures: FOTO = 98 (improved from 67 at intake)    Assessment:   Intrinsic tightness at 3rd digit only, mild  (-) tenderness with palpation  Patient tolerated treatment session well  Good technique with TE  Reviewed home exercises in which patient verbalized and demonstrated understanding  Applied KT to 2nd finger for stability at MCP joint- patient provided with KT for home  Plan: Follow up in one week as needed       Precautions: pacemaker, hx of L RTC repair,       Manual  /5           Intrinsic stretching x5' x5' x5'          STM x5' x5' x5'                                                     Exercise Diary            Hook fist 2x10            Puppet hand 2x10 2x10           Hook fist pegs 1 round 1  round 2 rounds          theraball puttethand x20 x20 x30          Putty press puppet hand  x2' yellow x2' yellow          Rubber band spread  yellow x1 round Red x1 round                                                                                                                                                                                   HEP: intrinsic stretching, TGE's x8'                                          Modalities             KT  MCP 2nd digit MCP 2nd digit

## 2019-11-25 ENCOUNTER — APPOINTMENT (OUTPATIENT)
Dept: PHYSICAL THERAPY | Facility: MEDICAL CENTER | Age: 59
End: 2019-11-25
Payer: COMMERCIAL

## 2019-11-27 ENCOUNTER — OFFICE VISIT (OUTPATIENT)
Dept: FAMILY MEDICINE CLINIC | Facility: CLINIC | Age: 59
End: 2019-11-27
Payer: COMMERCIAL

## 2019-11-27 VITALS
TEMPERATURE: 97.6 F | DIASTOLIC BLOOD PRESSURE: 80 MMHG | WEIGHT: 247.2 LBS | HEIGHT: 67 IN | HEART RATE: 62 BPM | OXYGEN SATURATION: 95 % | BODY MASS INDEX: 38.8 KG/M2 | SYSTOLIC BLOOD PRESSURE: 130 MMHG | RESPIRATION RATE: 17 BRPM

## 2019-11-27 DIAGNOSIS — R07.81 RIB PAIN ON LEFT SIDE: Primary | ICD-10-CM

## 2019-11-27 PROCEDURE — 99213 OFFICE O/P EST LOW 20 MIN: CPT | Performed by: FAMILY MEDICINE

## 2019-11-27 RX ORDER — MELOXICAM 15 MG/1
15 TABLET ORAL DAILY
Qty: 30 TABLET | Refills: 0 | Status: SHIPPED | OUTPATIENT
Start: 2019-11-27 | End: 2019-12-20 | Stop reason: SDUPTHER

## 2019-11-27 RX ORDER — CYCLOBENZAPRINE HCL 10 MG
10 TABLET ORAL 3 TIMES DAILY PRN
Qty: 30 TABLET | Refills: 0 | Status: SHIPPED | OUTPATIENT
Start: 2019-11-27 | End: 2020-01-10

## 2019-11-27 NOTE — PROGRESS NOTES
Assessment/Plan:    Patient is a 61-year-old female with what is most likely musculoskeletal pain  She was doing some heavy cleaning but otherwise does not recall any injury  We discussed other etiologies of this pain but I do not believe her symptoms or exam are consistent with cardiac or respiratory causes  I see no rash  Certainly if her symptoms worsen, she should go to the emergency room  He she should call if she has no improvement  Diagnoses and all orders for this visit:    Rib pain on left side  -     meloxicam (MOBIC) 15 mg tablet; Take 1 tablet (15 mg total) by mouth daily  -     cyclobenzaprine (FLEXERIL) 10 mg tablet; Take 1 tablet (10 mg total) by mouth 3 (three) times a day as needed for muscle spasms          Subjective:   Chief Complaint   Patient presents with    Rib Pain     L side rib pain started last night         Patient ID: Corine Cushing is a 64 y o  female  Patient started with pain in the left lateral ribs after cleaning last night  Woke up in the middle of the night as the pain was getting worse  Pain is not relieved with Tylenol  Also had 3 bouts of hiccups this morning  Pain is more prominent when twist to the right, but not tender on palpation  The following portions of the patient's history were reviewed and updated as appropriate: allergies, current medications, past family history, past medical history, past social history, past surgical history and problem list     Review of Systems   Constitutional: Negative for chills and fever  HENT: Negative  Respiratory:          Pain can take her breath away  Cardiovascular: Negative for palpitations and leg swelling  Gastrointestinal: Negative for constipation, diarrhea, nausea and vomiting  Hiccups   Musculoskeletal:          As in HPI  Skin: Negative for rash  Psychiatric/Behavioral: Negative            Objective:      /80 (BP Location: Left arm, Patient Position: Sitting, Cuff Size: Large)   Pulse 62   Temp 97 6 °F (36 4 °C) (Tympanic)   Resp 17   Ht 5' 7" (1 702 m)   Wt 112 kg (247 lb 3 2 oz)   LMP  (LMP Unknown)   SpO2 95%   BMI 38 72 kg/m²          Physical Exam   Constitutional: She is oriented to person, place, and time  No distress  Neck: Normal range of motion  Cardiovascular: Normal rate and regular rhythm  Pulmonary/Chest: Effort normal and breath sounds normal    Abdominal: Soft  Bowel sounds are normal    obese   Musculoskeletal:     Pain with range of motion  Lymphadenopathy:     She has no cervical adenopathy  Neurological: She is alert and oriented to person, place, and time  Skin: No rash noted  Psychiatric: She has a normal mood and affect  Nursing note and vitals reviewed

## 2019-12-12 PROBLEM — B02.9 HERPES ZOSTER WITHOUT COMPLICATION: Status: ACTIVE | Noted: 2019-12-12

## 2019-12-13 ENCOUNTER — OFFICE VISIT (OUTPATIENT)
Dept: FAMILY MEDICINE CLINIC | Facility: CLINIC | Age: 59
End: 2019-12-13
Payer: COMMERCIAL

## 2019-12-13 VITALS
TEMPERATURE: 98 F | HEART RATE: 72 BPM | DIASTOLIC BLOOD PRESSURE: 80 MMHG | WEIGHT: 247 LBS | HEIGHT: 67 IN | OXYGEN SATURATION: 98 % | BODY MASS INDEX: 38.77 KG/M2 | RESPIRATION RATE: 17 BRPM | SYSTOLIC BLOOD PRESSURE: 130 MMHG

## 2019-12-13 DIAGNOSIS — B02.9 HERPES ZOSTER WITHOUT COMPLICATION: Primary | ICD-10-CM

## 2019-12-13 PROCEDURE — 1036F TOBACCO NON-USER: CPT | Performed by: FAMILY MEDICINE

## 2019-12-13 PROCEDURE — 3008F BODY MASS INDEX DOCD: CPT | Performed by: FAMILY MEDICINE

## 2019-12-13 PROCEDURE — 99213 OFFICE O/P EST LOW 20 MIN: CPT | Performed by: FAMILY MEDICINE

## 2019-12-13 NOTE — ASSESSMENT & PLAN NOTE
Patient presents for recheck of shingles  Patient developed rash on left flank approximately 2 weeks ago while away in [facility]  She was seen by urgent care there and given prescription for Valtrex  She has been having mild to moderate pain, but is much improved in the past few days  Upon examination today, patient exhibits dried vesicular cholesterol on left flank  No signs of infection  Since pain is much improved  Will not recommend any additional treatment at this time  Patient is to contact me should her pain return  If this occurs, will start gabapentin  Otherwise I would like to get her vaccinated with Shingrix and approximately 3-6 months

## 2019-12-13 NOTE — PROGRESS NOTES
[facility]      NAME: Britt Gastelum  AGE: 64 y o  SEX: female  : 1960   MRN: [id number]    DATE: 2019  TIME: 9:10 AM    Assessment and Plan     Problem List Items Addressed This Visit     Herpes zoster without complication - Primary     Patient presents for recheck of shingles  Patient developed rash on left flank approximately 2 weeks ago while away in [facility]  She was seen by urgent care there and given prescription for Valtrex  She has been having mild to moderate pain, but is much improved in the past few days  Upon examination today, patient exhibits dried vesicular cholesterol on left flank  No signs of infection  Since pain is much improved  Will not recommend any additional treatment at this time  Patient is to contact me should her pain return  If this occurs, will start gabapentin  Otherwise I would like to get her vaccinated with Shingrix and approximately 3-6 months  Return to office in:  Call if pain returns  Keep next regularly scheduled appointment      Will make sure patient gets Shingrix vaccine in 3-6 months    Chief Complaint     Chief Complaint   Patient presents with    Follow-up     shingles       History of Present Illness      Patient presents for recheck of shingles  Patient developed rash on left flank approximately 2 weeks ago while away in [facility]  She was seen by urgent care there and given prescription for Valtrex  She has been having mild to moderate pain, but is much improved in the past few days  The following portions of the patient's history were reviewed and updated as appropriate: allergies, current medications, past family history, past medical history, past social history, past surgical history and problem list     Review of Systems   Review of Systems   Respiratory: Negative  Cardiovascular: Negative  Gastrointestinal: Negative  Genitourinary: Negative          Active Problem List     Patient Active Problem List   Diagnosis    Mixed hyperlipidemia    Sinus bradycardia    Left hand paresthesia    Cervical radiculopathy    Obesity (BMI 30-39  9)    Abnormal blood sugar    Allergic rhinitis    Allergic reaction    Subclinical hypothyroidism    Pacemaker    Vitamin D deficiency    Hand pain, right    Herpes zoster without complication       Objective   /80 (BP Location: Left arm, Patient Position: Sitting, Cuff Size: Large)   Pulse 72   Temp 98 °F (36 7 °C) (Tympanic)   Resp 17   Ht 5' 6 93" (1 7 m)   Wt 112 kg (247 lb)   LMP  (LMP Unknown)   SpO2 98%   BMI 38 77 kg/m²     Physical Exam   Skin:   Dried  Vesicular cluster left flank  No erythema    No discharge       Pertinent Laboratory/Diagnostic Studies:   none    Current Medications     Current Outpatient Medications:     aspirin 81 MG tablet, Take 81 mg by mouth daily, Disp: , Rfl:     azelastine (ASTELIN) 0 1 % nasal spray, 2 sprays into each nostril daily Use in each nostril as directed, Disp: , Rfl:     Cholecalciferol (VITAMIN D-3 PO), Take 4,000 Units by mouth daily, Disp: , Rfl:     EPINEPHrine (EPIPEN) 0 3 mg/0 3 mL SOAJ, Inject 0 3 mL (0 3 mg total) into a muscle as needed for anaphylaxis (twin pack), Disp: 2 each, Rfl: 3    FLUTICASONE PROPIONATE, NASAL, NA, 2 sprays into each nostril as needed  , Disp: , Rfl:     levothyroxine 50 mcg tablet, Take 1 tablet (50 mcg total) by mouth daily, Disp: 90 tablet, Rfl: 1    loratadine (CLARITIN) 10 mg tablet, Take 10 mg by mouth daily, Disp: , Rfl:     Omega-3 Fatty Acids (FISH OIL PO), Take 2,000 mg by mouth 2 (two) times a day, Disp: , Rfl:     Red Yeast Rice Extract (RED YEAST RICE PO), Take 1,200 mg by mouth 2 (two) times a day, Disp: , Rfl:     cyclobenzaprine (FLEXERIL) 10 mg tablet, Take 1 tablet (10 mg total) by mouth 3 (three) times a day as needed for muscle spasms (Patient not taking: Reported on 12/13/2019), Disp: 30 tablet, Rfl: 0    meloxicam (MOBIC) 15 mg tablet, Take 1 tablet (15 mg total) by mouth daily (Patient not taking: Reported on 12/13/2019), Disp: 30 tablet, Rfl: 0    Health Maintenance     Health Maintenance   Topic Date Due    DTaP,Tdap,and Td Vaccines (1 - Tdap) 04/27/1973    BMI: Followup Plan  04/27/1980    PT PLAN OF CARE  11/16/2019    HIV Screening  01/13/2020 (Originally 4/27/1975)    Hepatitis C Screening  12/13/2020 (Originally 1960)    MAMMOGRAM  05/16/2020    Depression Screening PHQ  10/17/2020    BMI: Adult  12/13/2020    CRC Screening: Colonoscopy  08/22/2023    Pneumococcal Vaccine: 65+ Years (1 of 2 - PCV13) 04/27/2027    Influenza Vaccine  Completed    Pneumococcal Vaccine: Pediatrics (0 to 5 Years) and At-Risk Patients (6 to 59 Years)  Aged Out    HIB Vaccine  Aged Out    Hepatitis B Vaccine  Aged Out    IPV Vaccine  Aged Out    Hepatitis A Vaccine  Aged Out    Meningococcal ACWY Vaccine  Aged Out    HPV Vaccine  Aged [employer] History   Administered Date(s) Administered    INFLUENZA 10/01/2016, 10/02/2017, 10/30/2019    Influenza Quadrivalent, 6-35 Months IM 10/02/2017    Influenza, Quadrivalent (nasal) 10/01/2016       Echo Reese DO  [facility]

## 2019-12-17 DIAGNOSIS — B02.29 POST HERPETIC NEURALGIA: Primary | ICD-10-CM

## 2019-12-17 RX ORDER — GABAPENTIN 300 MG/1
CAPSULE ORAL
Qty: 60 CAPSULE | Refills: 0 | Status: SHIPPED | OUTPATIENT
Start: 2019-12-17 | End: 2020-04-20 | Stop reason: ALTCHOICE

## 2019-12-20 DIAGNOSIS — R07.81 RIB PAIN ON LEFT SIDE: ICD-10-CM

## 2019-12-20 RX ORDER — MELOXICAM 15 MG/1
TABLET ORAL
Qty: 30 TABLET | Refills: 0 | Status: SHIPPED | OUTPATIENT
Start: 2019-12-20 | End: 2020-01-10

## 2020-01-06 ENCOUNTER — IN-CLINIC DEVICE VISIT (OUTPATIENT)
Dept: CARDIOLOGY CLINIC | Facility: CLINIC | Age: 60
End: 2020-01-06
Payer: COMMERCIAL

## 2020-01-06 DIAGNOSIS — Z95.0 CARDIAC PACEMAKER IN SITU: Primary | ICD-10-CM

## 2020-01-06 PROCEDURE — 93288 INTERROG EVL PM/LDLS PM IP: CPT | Performed by: INTERNAL MEDICINE

## 2020-01-06 NOTE — PROGRESS NOTES
Results for orders placed or performed in visit on 01/06/20   Cardiac EP device report    Narrative    MDT DUAL CHAMBER PM  DEVICE INTERROGATED IN THE [place] OFFICE (NO TEST): PT CALLED WITH C/O SUDDEN INCREASED EPISODES OF NEAR SYNCOPE AS BEFORE PPM IMPLANT  BATTERY VOLTAGE REACHED RRT (12/25/19 - 2 62 V) WHICH COORELATES WITH ONSET OF SXS  PT SCHEDULED FOR H&P WITH DR CANTU 1/10/2020 1:00 P  [place] [facility]   0 6%  ALL EGRM STORAGE PROGRAMMED OFF DUE TO BATTERY STATUS  NORMAL DEVICE FUNCTION @ RRT BATTERY STATUS    EB

## 2020-01-09 ENCOUNTER — CONSULT (OUTPATIENT)
Dept: CARDIOLOGY CLINIC | Facility: CLINIC | Age: 60
End: 2020-01-09
Payer: COMMERCIAL

## 2020-01-09 VITALS
DIASTOLIC BLOOD PRESSURE: 92 MMHG | WEIGHT: 251.6 LBS | BODY MASS INDEX: 40.43 KG/M2 | HEART RATE: 65 BPM | HEIGHT: 66 IN | SYSTOLIC BLOOD PRESSURE: 162 MMHG

## 2020-01-09 DIAGNOSIS — R06.00 DYSPNEA ON EXERTION: Primary | ICD-10-CM

## 2020-01-09 DIAGNOSIS — R00.1 SEVERE SINUS BRADYCARDIA: Primary | ICD-10-CM

## 2020-01-09 PROCEDURE — 99215 OFFICE O/P EST HI 40 MIN: CPT | Performed by: INTERNAL MEDICINE

## 2020-01-09 NOTE — PROGRESS NOTES
Patient reports experiencing epigastric pain on oscar morning with her symptoms worsening to having orthostatic episodes with shortness of breath  She states back in 2007 she experienced the same situation when she was reaching [employer]  Once she had her generator changed her symptoms completely subsided  She denies LE edema or syncope

## 2020-01-09 NOTE — PROGRESS NOTES
EPS Consultation/New Patient Evaluation - Georgia Youngblood 64 y o  female MRN: [id number]    CC/HPI:   It was a pleasure to see Georgia Youngblood in our arrhythmia clinic at [place] [address]  As you know she is a 64 y o  Woman with sinus bradycardia s/p PPM with previous gen change, arthritis, morbidity obesity, HTN who presents to discuss management of her dyspnea  Patient reports experiencing epigastric pain on oscar morning with her symptoms worsening to having orthostatic episodes with shortness of breath  She states back in 2007 she experienced the same situation when her pacemaker had reached CHAS  However the current symptoms are worse  She also feels dyspnea to walking 1/2 block and has noticed weight gain  She feels coughing every few minutes but does not have any phlegm  She denies LE edema or syncope  She had pacemaker placed originally after having syncopal episodes due to bradycardia       Past Medical History:  Past Medical History:   Diagnosis Date    Abnormal blood chemistry     last assessed: 6/9/2014    Allergic reaction     last assessed: 9/17/2012    Allergic rhinitis     last assessed: 6/9/2014    Arthralgia of left shoulder region     last assessed: 12/3/2013    Arthritis     Dysautonomia ([facility] [address] )     Elevated blood pressure reading without diagnosis of hypertension     last assessed: 9/30/2016    Endometriosis     Familial combined hyperlipidemia     last assessed: 6/9/2014    Female infertility     Fibroid     Functional murmur     description: soft systolic murmur - no valvular disease on 2D echo Last assessed: 4/23/2014    Hypertension     Obstruction of eustachian tube     unspecified laterality Last assessed: 3/17/2014    Polycystic ovary syndrome     PONV (postoperative nausea and vomiting)     Subclinical hypothyroidism 1/6/2019    Symptomatic bradycardia     Syncope     Wears glasses        Medications:      Current Outpatient Medications:     aspirin 81 MG tablet, Take 81 mg by mouth daily, Disp: , Rfl:     azelastine (ASTELIN) 0 1 % nasal spray, 2 sprays into each nostril daily Use in each nostril as directed, Disp: , Rfl:     Cholecalciferol (VITAMIN D-3 PO), Take 4,000 Units by mouth daily, Disp: , Rfl:     EPINEPHrine (EPIPEN) 0 3 mg/0 3 mL SOAJ, Inject 0 3 mL (0 3 mg total) into a muscle as needed for anaphylaxis (twin pack), Disp: 2 each, Rfl: 3    FLUTICASONE PROPIONATE, NASAL, NA, 2 sprays into each nostril as needed  , Disp: , Rfl:     gabapentin (NEURONTIN) 300 mg capsule, 1 cap qd x 3 days, then increase to 1 BID (Patient taking differently: 2 (two) times a day 1 cap qd x 3 days, then increase to 1 BID), Disp: 60 capsule, Rfl: 0    levothyroxine 50 mcg tablet, Take 1 tablet (50 mcg total) by mouth daily, Disp: 90 tablet, Rfl: 1    loratadine (CLARITIN) 10 mg tablet, Take 10 mg by mouth daily, Disp: , Rfl:     Omega-3 Fatty Acids (FISH OIL PO), Take 2,000 mg by mouth 2 (two) times a day, Disp: , Rfl:     Red Yeast Rice Extract (RED YEAST RICE PO), Take 1,200 mg by mouth 2 (two) times a day, Disp: , Rfl:     cyclobenzaprine (FLEXERIL) 10 mg tablet, Take 1 tablet (10 mg total) by mouth 3 (three) times a day as needed for muscle spasms (Patient not taking: Reported on 12/13/2019), Disp: 30 tablet, Rfl: 0    meloxicam (MOBIC) 15 mg tablet, TAKE 1 TABLET BY MOUTH EVERY DAY (Patient not taking: Reported on 1/9/2020), Disp: 30 tablet, Rfl: 0     Family History   Problem Relation Age of Onset    Breast cancer Mother 76    Ovarian cancer Mother 80    Multiple myeloma Mother     Cancer Father         liver    Allergies Family         bronchitis    Diabetes Family     Seizures Family     Heart disease Family     Hypertension Family     Skin cancer Family     No Known Problems Sister     No Known Problems Daughter     Breast cancer additional onset Maternal Grandmother     Cancer Maternal Grandfather         thyroid    No Known Problems Sister     No Known Problems Sister     No Known Problems Daughter     No Known Problems Maternal Aunt     No Known Problems Paternal Aunt     Cancer Paternal Aunt         liver     Social History     Socioeconomic History    Marital status: /Civil Union     Spouse name: Not on file    Number of children: 3    Years of education: Not on file    Highest education level: Not on file   Occupational History    Not on file   Social Needs    Financial resource strain: Not on file    Food insecurity:     Worry: Not on file     Inability: Not on file    Transportation needs:     Medical: Not on file     Non-medical: Not on file   Tobacco Use    Smoking status: Never Smoker    Smokeless tobacco: Never Used   Substance and Sexual Activity    Alcohol use: Yes     Frequency: 2-3 times a week     Drinks per session: 3 or 4     Comment: 1-2 x per year Per allscripts - no alcohol use     Drug use: No    Sexual activity: Yes     Birth control/protection: Female Sterilization   Lifestyle    Physical activity:     Days per week: Not on file     Minutes per session: Not on file    Stress: Not on file   Relationships    Social connections:     Talks on phone: Not on file     Gets together: Not on file     Attends Rastafarian service: Not on file     Active member of club or organization: Not on file     Attends meetings of clubs or organizations: Not on file     Relationship status: Not on file    Intimate partner violence:     Fear of current or ex partner: Not on file     Emotionally abused: Not on file     Physically abused: Not on file     Forced sexual activity: Not on file   Other Topics Concern    Not on file   Social History Narrative    Living independently with spouse    Caffeine use      Social History     Tobacco Use   Smoking Status Never Smoker   Smokeless Tobacco Never Used     Social History     Substance and Sexual Activity   Alcohol Use Yes    Frequency: 2-3 times a week    Drinks per session: 3 or 4    Comment: 1-2 x per year Per allscripts - no alcohol use        Review of Systems   Constitution: Positive for malaise/fatigue and weight gain  Negative for chills and fever  HENT: Negative  Eyes: Negative for blurred vision and double vision  Cardiovascular: Positive for dyspnea on exertion and palpitations  Negative for chest pain, leg swelling, orthopnea, paroxysmal nocturnal dyspnea and syncope  Respiratory: Positive for cough and shortness of breath  Negative for sputum production and wheezing  Endocrine: Negative  Skin: Negative  Negative for rash  Musculoskeletal: Negative  Gastrointestinal: Negative for abdominal pain, nausea and vomiting  Genitourinary: Negative  Neurological: Negative for dizziness and light-headedness  Psychiatric/Behavioral: Negative  Objective:     Vitals: Blood pressure 162/92, pulse 65, height 5' 6" (1 676 m), weight 114 kg (251 lb 9 6 oz), not currently breastfeeding , Body mass index is 40 61 kg/m²  ,        Physical Exam:    GEN: Robert Cooney appears well, alert and oriented x 3, pleasant and cooperative; morbidly obese  HEENT: pupils equal, round, and reactive to light; extraocular muscles intact  NECK: supple, no carotid bruits   HEART: regular rhythm, normal S1 and S2, no murmurs, clicks, gallops or rubs   LUNGS: clear to auscultation bilaterally; no wheezes, rales, or rhonchi   ABDOMEN: normal bowel sounds, soft, no tenderness, no distention  EXTREMITIES: peripheral pulses normal; no clubbing, cyanosis, or edema  NEURO: no focal findings   SKIN: normal without suspicious lesions on exposed skin      Labs & Results:  Below is the patient's most recent value for Albumin, ALT, AST, BUN, Calcium, Chloride, Cholesterol, CO2, Creatinine, GFR, Glucose, HDL, Hematocrit, Hemoglobin, Hemoglobin A1C, LDL, Magnesium, Phosphorus, Platelets, Potassium, PSA, Sodium, Triglycerides, and WBC     Lab Results   Component Value Date    ALT 23 07/12/2019    AST 14 07/12/2019    BUN 25 07/12/2019    CALCIUM 9 0 07/12/2019     07/12/2019    CHOL 204 12/17/2015    CO2 28 07/12/2019    CREATININE 1 02 07/12/2019    HDL 49 07/12/2019    HCT 40 6 01/03/2019    HGB 12 8 01/03/2019    HGBA1C 5 6 07/12/2019     01/03/2019    K 4 1 07/12/2019     12/17/2015    TRIG 98 07/12/2019    WBC 6 28 01/03/2019     Note: for a comprehensive list of the patient's lab results, access the Results Review activity  Cardiac testing:     I personally reviewed the ECG performed in the clinic on 01/09/20  It reveals ventricular paced rhythm at 65  Bpm      Device interrogation shows it has reached CHAS, and current mode is at VVI 65 bpm      ASSESSMENT:  #Syncope, sinus bradycardia s/p PPM  - Device now CHAS  - VVI at 65 bpm; unable to change modes due to CHAS status    #Arthritis  - stable    #Morbidity obesity  - attempting to loose weight   #HTN   - hypertensive in the office today  - Re-evaluate after generator change       SUMMARY:    In summary, Ms Dev Figueroa is a 64 y o  woman with HTN, morbid obesity, hypothyroid, syncope s/p PPM now at CHAS who has been having pacemaker syndrome since the device has mode switched to VVI 65 bpm  Unfortunately mode cannot be switched back due to CHAS status and patient need generator change  She has symptoms that are limiting her lifestyle  We will attempt at getting her schedule within a week  She was advised to go to ER if her symptoms worsen  She is also advised to stay away from driving  PLAN:  Schedule generator change  If has worsening of symptoms then recommend going to ER

## 2020-01-10 ENCOUNTER — ANESTHESIA (EMERGENCY)
Dept: NON INVASIVE DIAGNOSTICS | Facility: HOSPITAL | Age: 60
End: 2020-01-10
Payer: COMMERCIAL

## 2020-01-10 ENCOUNTER — APPOINTMENT (OUTPATIENT)
Dept: NON INVASIVE DIAGNOSTICS | Facility: HOSPITAL | Age: 60
End: 2020-01-10
Payer: COMMERCIAL

## 2020-01-10 ENCOUNTER — ANESTHESIA EVENT (EMERGENCY)
Dept: NON INVASIVE DIAGNOSTICS | Facility: HOSPITAL | Age: 60
End: 2020-01-10
Payer: COMMERCIAL

## 2020-01-10 ENCOUNTER — APPOINTMENT (EMERGENCY)
Dept: RADIOLOGY | Facility: HOSPITAL | Age: 60
End: 2020-01-10
Payer: COMMERCIAL

## 2020-01-10 ENCOUNTER — HOSPITAL ENCOUNTER (EMERGENCY)
Facility: HOSPITAL | Age: 60
Discharge: HOME | End: 2020-01-10
Attending: EMERGENCY MEDICINE | Admitting: EMERGENCY MEDICINE
Payer: COMMERCIAL

## 2020-01-10 ENCOUNTER — APPOINTMENT (EMERGENCY)
Dept: NON INVASIVE DIAGNOSTICS | Facility: HOSPITAL | Age: 60
End: 2020-01-10
Payer: COMMERCIAL

## 2020-01-10 VITALS
BODY MASS INDEX: 40.56 KG/M2 | TEMPERATURE: 97.9 F | OXYGEN SATURATION: 100 % | SYSTOLIC BLOOD PRESSURE: 142 MMHG | WEIGHT: 251.32 LBS | RESPIRATION RATE: 18 BRPM | DIASTOLIC BLOOD PRESSURE: 69 MMHG | HEART RATE: 71 BPM

## 2020-01-10 DIAGNOSIS — T82.9XXA DISORDER OF CARDIAC PACEMAKER SYSTEM, INITIAL ENCOUNTER: ICD-10-CM

## 2020-01-10 DIAGNOSIS — R42 DIZZINESS: Primary | ICD-10-CM

## 2020-01-10 LAB
ANION GAP SERPL CALCULATED.3IONS-SCNC: 6 MMOL/L (ref 4–13)
ATRIAL RATE: 65 BPM
ATRIAL RATE: 66 BPM
BASOPHILS # BLD AUTO: 0.05 THOUSANDS/ΜL (ref 0–0.1)
BASOPHILS NFR BLD AUTO: 1 % (ref 0–1)
BUN SERPL-MCNC: 18 MG/DL (ref 5–25)
CALCIUM SERPL-MCNC: 9 MG/DL (ref 8.3–10.1)
CHLORIDE SERPL-SCNC: 109 MMOL/L (ref 100–108)
CO2 SERPL-SCNC: 25 MMOL/L (ref 21–32)
CREAT SERPL-MCNC: 1.15 MG/DL (ref 0.6–1.3)
EOSINOPHIL # BLD AUTO: 0.15 THOUSAND/ΜL (ref 0–0.61)
EOSINOPHIL NFR BLD AUTO: 2 % (ref 0–6)
ERYTHROCYTE [DISTWIDTH] IN BLOOD BY AUTOMATED COUNT: 13.2 % (ref 11.6–15.1)
GFR SERPL CREATININE-BSD FRML MDRD: 53 ML/MIN/1.73SQ M
GLUCOSE SERPL-MCNC: 102 MG/DL (ref 65–140)
HCT VFR BLD AUTO: 38.7 % (ref 34.8–46.1)
HGB BLD-MCNC: 12.8 G/DL (ref 11.5–15.4)
IMM GRANULOCYTES # BLD AUTO: 0.02 THOUSAND/UL (ref 0–0.2)
IMM GRANULOCYTES NFR BLD AUTO: 0 % (ref 0–2)
LYMPHOCYTES # BLD AUTO: 2.49 THOUSANDS/ΜL (ref 0.6–4.47)
LYMPHOCYTES NFR BLD AUTO: 38 % (ref 14–44)
MCH RBC QN AUTO: 30 PG (ref 26.8–34.3)
MCHC RBC AUTO-ENTMCNC: 33.1 G/DL (ref 31.4–37.4)
MCV RBC AUTO: 91 FL (ref 82–98)
MONOCYTES # BLD AUTO: 0.5 THOUSAND/ΜL (ref 0.17–1.22)
MONOCYTES NFR BLD AUTO: 8 % (ref 4–12)
NEUTROPHILS # BLD AUTO: 3.3 THOUSANDS/ΜL (ref 1.85–7.62)
NEUTS SEG NFR BLD AUTO: 51 % (ref 43–75)
NRBC BLD AUTO-RTO: 0 /100 WBCS
P AXIS: 61 DEGREES
PLATELET # BLD AUTO: 317 THOUSANDS/UL (ref 149–390)
PMV BLD AUTO: 10.1 FL (ref 8.9–12.7)
POTASSIUM SERPL-SCNC: 3.8 MMOL/L (ref 3.5–5.3)
PR INTERVAL: 196 MS
QRS AXIS: -79 DEGREES
QRS AXIS: 81 DEGREES
QRSD INTERVAL: 164 MS
QRSD INTERVAL: 86 MS
QT INTERVAL: 422 MS
QT INTERVAL: 494 MS
QTC INTERVAL: 442 MS
QTC INTERVAL: 521 MS
RBC # BLD AUTO: 4.27 MILLION/UL (ref 3.81–5.12)
SODIUM SERPL-SCNC: 140 MMOL/L (ref 136–145)
T WAVE AXIS: -66 DEGREES
T WAVE AXIS: 89 DEGREES
TROPONIN I SERPL-MCNC: <0.02 NG/ML
TROPONIN I SERPL-MCNC: <0.02 NG/ML
VENTRICULAR RATE: 66 BPM
VENTRICULAR RATE: 67 BPM
WBC # BLD AUTO: 6.51 THOUSAND/UL (ref 4.31–10.16)

## 2020-01-10 PROCEDURE — 84484 ASSAY OF TROPONIN QUANT: CPT | Performed by: EMERGENCY MEDICINE

## 2020-01-10 PROCEDURE — 36415 COLL VENOUS BLD VENIPUNCTURE: CPT | Performed by: EMERGENCY MEDICINE

## 2020-01-10 PROCEDURE — 99285 EMERGENCY DEPT VISIT HI MDM: CPT | Performed by: EMERGENCY MEDICINE

## 2020-01-10 PROCEDURE — 99245 OFF/OP CONSLTJ NEW/EST HI 55: CPT | Performed by: INTERNAL MEDICINE

## 2020-01-10 PROCEDURE — 85025 COMPLETE CBC W/AUTO DIFF WBC: CPT | Performed by: EMERGENCY MEDICINE

## 2020-01-10 PROCEDURE — 93306 TTE W/DOPPLER COMPLETE: CPT

## 2020-01-10 PROCEDURE — 93306 TTE W/DOPPLER COMPLETE: CPT | Performed by: INTERNAL MEDICINE

## 2020-01-10 PROCEDURE — C1785 PMKR, DUAL, RATE-RESP: HCPCS

## 2020-01-10 PROCEDURE — 71045 X-RAY EXAM CHEST 1 VIEW: CPT

## 2020-01-10 PROCEDURE — 80048 BASIC METABOLIC PNL TOTAL CA: CPT | Performed by: EMERGENCY MEDICINE

## 2020-01-10 PROCEDURE — 33228 REMV&REPLC PM GEN DUAL LEAD: CPT | Performed by: INTERNAL MEDICINE

## 2020-01-10 PROCEDURE — 96365 THER/PROPH/DIAG IV INF INIT: CPT

## 2020-01-10 PROCEDURE — 93010 ELECTROCARDIOGRAM REPORT: CPT | Performed by: INTERNAL MEDICINE

## 2020-01-10 PROCEDURE — 99285 EMERGENCY DEPT VISIT HI MDM: CPT

## 2020-01-10 PROCEDURE — 93005 ELECTROCARDIOGRAM TRACING: CPT

## 2020-01-10 PROCEDURE — 33228 REMV&REPLC PM GEN DUAL LEAD: CPT

## 2020-01-10 RX ORDER — ACETAMINOPHEN 325 MG/1
650 TABLET ORAL EVERY 4 HOURS PRN
Status: CANCELLED | OUTPATIENT
Start: 2020-01-10

## 2020-01-10 RX ORDER — CEFAZOLIN SODIUM 2 G/50ML
2000 SOLUTION INTRAVENOUS ONCE
Status: DISCONTINUED | OUTPATIENT
Start: 2020-01-10 | End: 2020-01-10 | Stop reason: HOSPADM

## 2020-01-10 RX ORDER — CEFAZOLIN SODIUM 2 G/50ML
2000 SOLUTION INTRAVENOUS ONCE
Status: COMPLETED | OUTPATIENT
Start: 2020-01-10 | End: 2020-01-10

## 2020-01-10 RX ORDER — SODIUM CHLORIDE 9 MG/ML
INJECTION, SOLUTION INTRAVENOUS CONTINUOUS PRN
Status: DISCONTINUED | OUTPATIENT
Start: 2020-01-10 | End: 2020-01-10 | Stop reason: SURG

## 2020-01-10 RX ORDER — ASPIRIN 81 MG/1
324 TABLET, CHEWABLE ORAL ONCE
Status: COMPLETED | OUTPATIENT
Start: 2020-01-10 | End: 2020-01-10

## 2020-01-10 RX ORDER — FENTANYL CITRATE 50 UG/ML
INJECTION, SOLUTION INTRAMUSCULAR; INTRAVENOUS AS NEEDED
Status: DISCONTINUED | OUTPATIENT
Start: 2020-01-10 | End: 2020-01-10 | Stop reason: SURG

## 2020-01-10 RX ORDER — GENTAMICIN SULFATE 40 MG/ML
INJECTION, SOLUTION INTRAMUSCULAR; INTRAVENOUS CODE/TRAUMA/SEDATION MEDICATION
Status: COMPLETED | OUTPATIENT
Start: 2020-01-10 | End: 2020-01-10

## 2020-01-10 RX ORDER — LIDOCAINE HYDROCHLORIDE 10 MG/ML
INJECTION, SOLUTION EPIDURAL; INFILTRATION; INTRACAUDAL; PERINEURAL AS NEEDED
Status: DISCONTINUED | OUTPATIENT
Start: 2020-01-10 | End: 2020-01-10 | Stop reason: SURG

## 2020-01-10 RX ORDER — PROPOFOL 10 MG/ML
INJECTION, EMULSION INTRAVENOUS CONTINUOUS PRN
Status: DISCONTINUED | OUTPATIENT
Start: 2020-01-10 | End: 2020-01-10 | Stop reason: SURG

## 2020-01-10 RX ORDER — LIDOCAINE HYDROCHLORIDE 10 MG/ML
INJECTION, SOLUTION EPIDURAL; INFILTRATION; INTRACAUDAL; PERINEURAL CODE/TRAUMA/SEDATION MEDICATION
Status: COMPLETED | OUTPATIENT
Start: 2020-01-10 | End: 2020-01-10

## 2020-01-10 RX ORDER — CEFAZOLIN SODIUM 2 G/50ML
SOLUTION INTRAVENOUS AS NEEDED
Status: DISCONTINUED | OUTPATIENT
Start: 2020-01-10 | End: 2020-01-10 | Stop reason: SURG

## 2020-01-10 RX ADMIN — FENTANYL CITRATE 50 MCG: 50 INJECTION, SOLUTION INTRAMUSCULAR; INTRAVENOUS at 15:53

## 2020-01-10 RX ADMIN — PROPOFOL 40 MCG/KG/MIN: 10 INJECTION, EMULSION INTRAVENOUS at 16:01

## 2020-01-10 RX ADMIN — CEFAZOLIN SODIUM 2000 MG: 2 SOLUTION INTRAVENOUS at 15:59

## 2020-01-10 RX ADMIN — SODIUM CHLORIDE: 0.9 INJECTION, SOLUTION INTRAVENOUS at 15:47

## 2020-01-10 RX ADMIN — LIDOCAINE HYDROCHLORIDE 5 ML: 10 INJECTION, SOLUTION EPIDURAL; INFILTRATION; INTRACAUDAL; PERINEURAL at 16:19

## 2020-01-10 RX ADMIN — ASPIRIN 81 MG 324 MG: 81 TABLET ORAL at 09:49

## 2020-01-10 RX ADMIN — LIDOCAINE HYDROCHLORIDE 20 ML: 10 INJECTION, SOLUTION EPIDURAL; INFILTRATION; INTRACAUDAL; PERINEURAL at 16:14

## 2020-01-10 RX ADMIN — GENTAMICIN SULFATE 80 MG: 40 INJECTION, SOLUTION INTRAMUSCULAR; INTRAVENOUS at 16:38

## 2020-01-10 RX ADMIN — CEFAZOLIN SODIUM 2000 MG: 2 SOLUTION INTRAVENOUS at 12:47

## 2020-01-10 RX ADMIN — LIDOCAINE HYDROCHLORIDE 50 MG: 10 INJECTION, SOLUTION EPIDURAL; INFILTRATION; INTRACAUDAL; PERINEURAL at 16:01

## 2020-01-10 RX ADMIN — FENTANYL CITRATE 50 MCG: 50 INJECTION, SOLUTION INTRAMUSCULAR; INTRAVENOUS at 16:01

## 2020-01-10 NOTE — CONSULTS
Consultation - Electrophysiology  Britt Gastelum 64 y o  female MRN: [id number]  Unit/Bed#: ED 27 Encounter: [id number]      Inpatient consult to Electrophysiology  Consult performed by: Jim Otto PA-C  Consult ordered by: Yaneth Rodriguez MD          History of Present Illness   Physician Requesting Consult: Yaneth Rodriguez MD  Reason for Consult / Principal Problem: Dyspnea    Assessment/Plan   Assessment:  1  Medtronic dual chamber pacemaker in situ  - placed for sinus bradycardia and syncope  - device having reached RRT on 12/25 / mode switched to VVI 65  2  Essential hypertension  - not on medications  3  Hyperlipidemia  - maintained on red yeast rice extract and fish oil   4  Hypothyroidism   - maintained on levothyroxine  5  Obesity with BMI of 40  6  Arthritis    Plan:  1  Would recommend pacemaker generator change as patient is currently having symptoms from her asynchronous pacing due to her low battery  Patient is aware of this plan and is in agreement  She has not eaten anything today and is not on anticoagulation  Will have echo done prior to procedure as she does not have one on file - pending those results and device interrogation intraop, we will still plan on proceeding with just simple generator GH  She was also educated on post generator change restrictions  She is aware that she will be able to leave after her 2 hour bed rest     HPI: Birtt Gastelum is a 64y o  year old female with a history of syncope status post dual-chamber pacemaker, hypertension, hyperlipidemia, hypothyroidism, obesity with BMI of 40, and arthritis  She follows with Dr Tristan Turcios as an outpatient but was seen most recently by Dr Josephine Chiang of electrophysiology yesterday  He had seen her due to the fact that her device had reached CHAS  At that time, she did report some epigastric pain and associated shortness of breath starting [place]    It was felt that some of her symptoms did stem from the fact that her device had switched to VVI 65 given the fact that it switched over to VVI at that time  Today, she did present to the emergency room as she was having symptoms  An EKG was performed showing below which does show ST depressions in the inferior leads - changes most likely due to her intermittent pacing "pacemaker memory"  She reports feeling dizzy and short of breath  She denies any chest pain  She currently does not have an echo on file  She first had her pacemaker placed in May of 1998 with last generator change being done in 2007  Labs drawn in the emergency room show no elevation in troponin as of yet  EKG:       Review of Systems  ROS as noted above, otherwise 12 point review of systems was performed and is negative  Historical Information   Past Medical History:   Diagnosis Date    Abnormal blood chemistry     last assessed: 6/9/2014    Allergic reaction     last assessed: 9/17/2012    Allergic rhinitis     last assessed: 6/9/2014    Arthralgia of left shoulder region     last assessed: 12/3/2013    Arthritis     Dysautonomia ([address] )     Elevated blood pressure reading without diagnosis of hypertension     last assessed: 9/30/2016    Endometriosis     Familial combined hyperlipidemia     last assessed: 6/9/2014    Female infertility     Fibroid     Functional murmur     description: soft systolic murmur - no valvular disease on 2D echo Last assessed: 4/23/2014    Hypertension     Obstruction of eustachian tube     unspecified laterality Last assessed: 3/17/2014    Polycystic ovary syndrome     PONV (postoperative nausea and vomiting)     Subclinical hypothyroidism 1/6/2019    Symptomatic bradycardia     Syncope     Wears glasses      Past Surgical History:   Procedure Laterality Date    APPENDECTOMY      CARDIAC CATHETERIZATION      Description: Normal EF, No obstructive coronary artery disease, systolic hypertension  Bridger Edwards done at [facility] Resolved: 11/9/1998  CARDIAC PACEMAKER PLACEMENT Left      SECTION      COLONOSCOPY      DEBRIDEMENT TENNIS ELBOW      GANGLION CYST EXCISION Left     hand    HYSTERECTOMY      KNEE ARTHROSCOPY Right     [place] SHLDR ARTHROSCOP,SURG,W/ROTAT CUFF REPR Left 10/3/2016    Procedure: ARTHROSCOPY SHOULDER, ROTATOR CUFF REPAIR,SUBACROMIAL DECOMPRESSION ; MICROFRACTURE MIKAELA GLENOID HEAD;  Surgeon: Della Naylor MD;  Location: [facility];  Service: Orthopedics    TONSILLECTOMY      WISDOM TOOTH EXTRACTION       Social History     Substance and Sexual Activity   Alcohol Use Yes    Frequency: 2-3 times a week    Drinks per session: 3 or 4    Comment: 1-2 x per year Per allscripts - no alcohol use      Social History     Substance and Sexual Activity   Drug Use No     Social History     Tobacco Use   Smoking Status Never Smoker   Smokeless Tobacco Never Used     Family History:   Family History   Problem Relation Age of Onset    Breast cancer Mother 76    Ovarian cancer Mother 80    Multiple myeloma Mother     Cancer Father         liver    Allergies Family         bronchitis    Diabetes Family     Seizures Family     Heart disease Family     Hypertension Family     Skin cancer Family     No Known Problems Sister     No Known Problems Daughter     Breast cancer additional onset Maternal Grandmother     Cancer Maternal Grandfather         thyroid    No Known Problems Sister     No Known Problems Sister     No Known Problems Daughter     No Known Problems Maternal Aunt     No Known Problems Paternal Aunt     Cancer Paternal Aunt         liver       Meds/Allergies   Hospital Medications: No current facility-administered medications for this encounter        Home Medications:   (Not in a hospital admission)    Allergies   Allergen Reactions    Shellfish Allergy Anaphylaxis    Shellfish-Derived Products Anaphylaxis    Shrimp Flavor Anaphylaxis    Darvon [Propoxyphene] Hives    Tramadol Hives    Ultram [Tramadol Hcl] Hives       Objective   Vitals: Blood pressure 159/76, pulse 65, temperature 97 9 °F (36 6 °C), temperature source Oral, resp  rate 18, weight 114 kg (251 lb 5 2 oz), SpO2 100 %, not currently breastfeeding  Orthostatic Blood Pressures      Most Recent Value   Blood Pressure  159/76 filed at 01/10/2020 9280   Patient Position - Orthostatic VS  Sitting filed at 01/10/2020 0849          No intake or output data in the 24 hours ending 01/10/20 0934    Invasive Devices     Peripheral Intravenous Line            Peripheral IV 01/10/20 Left Antecubital less than 1 day                Physical Exam   GEN: NAD, alert and oriented, well appearing  SKIN: dry without significant lesions or rashes  HEENT: NCAT, PERRL, EOMs intact  NECK: No JVD or carotid bruits appreciated  CARDIOVASCULAR: RRR, normal S1, S2 without murmurs, rubs, or gallops appreciated  LUNGS: Clear to auscultation bilaterally without wheezes, rhonchi, or rales  ABDOMEN: Soft, nontender, nondistended  EXTREMITIES/VASCULAR: perfused without clubbing, cyanosis, or edema b/l  PSYCH: Normal mood and affect  NEURO: CN ll-Xll grossly intact    Lab Results: I have personally reviewed pertinent lab results  Results from last 7 days   Lab Units 01/10/20  0851   WBC Thousand/uL 6 51   HEMOGLOBIN g/dL 12 8   HEMATOCRIT % 38 7   PLATELETS Thousands/uL 317     Results from last 7 days   Lab Units 01/10/20  0851   POTASSIUM mmol/L 3 8   CHLORIDE mmol/L 109*   CO2 mmol/L 25   BUN mg/dL 18   CREATININE mg/dL 1 15   CALCIUM mg/dL 9 0               Imaging: I have personally reviewed pertinent reports  ECHO: No results found for this or any previous visit  Cardiac testing:   ECHO:   No results found for this or any previous visit  No results found for this or any previous visit  CATH:  No results found for this or any previous visit  STRESS TEST:  No results found for this or any previous visit      VTE Prophylaxis: Sequential compression device Helena Olivarez

## 2020-01-10 NOTE — ED PROVIDER NOTES
History  Chief Complaint   Patient presents with    Dizziness     Pt has c/o dizziness and SOB since this AM     72-year-old female with past medical history of symptomatic bradycardia status post pacemaker placement in 1998 and generator change in 2007, hypothyroidism, hypertension, and obesity who is presenting with dyspnea  Patient reports that her symptoms began on [place], approximately 2 5 weeks ago  She noted orthostatic dizziness as well as dyspnea on exertion  The dyspnea has been progressing and on the morning of presentation, the patient began to no dyspnea at rest   Patient was evaluated by Electrophysiology yesterday  It was noted that her pacemaker had switched to VVI 65 beats per minute  This was secondary to generator needing to be changed  Plan was to have the patient scheduled for generator change within 1 week  Patient was advised to return to the ER with worsening symptoms  At this time, the patient reports persistent dyspnea on exertion  She does have a cough which she notices when her heart rate drops and the pacemaker begins with ventricular pacing  Patient denies any chest pain  She denies any fever, shaking chills, diaphoresis, focal weakness or numbness, speech difficulty, nausea, vomiting, abdominal pain, or any other complaints at this time  Patient denies a history of coronary artery disease  Patient does not have any other risk factors for CAD apart from hypertension such as diabetes mellitus, hyperlipidemia, CKD, history of tobacco abuse, history of cocaine abuse, history of SLE or other autoimmune condition, or family history of early CAD  Patient does not have any PE risk factors including surgery in the past 4 weeks, prolonged travel in the past 4 weeks, personal or family history of VTE, personal or family history of hypercoagulable state, active cancer, or exogenous estrogen use              Wt Readings from Last 3 Encounters:   01/10/20 114 kg (251 lb 5 2 oz) 20 114 kg (251 lb 9 6 oz)   19 112 kg (247 lb)       Prior to Admission Medications   Prescriptions Last Dose Informant Patient Reported? Taking?    Cholecalciferol (VITAMIN D-3 PO)  Self Yes Yes   Sig: Take 4,000 Units by mouth daily   EPINEPHrine (EPIPEN) 0 3 mg/0 3 mL SOAJ  Self No Yes   Sig: Inject 0 3 mL (0 3 mg total) into a muscle as needed for anaphylaxis (twin pack)   FLUTICASONE PROPIONATE, NASAL, NA  Self Yes Yes   Si sprays into each nostril as needed     Omega-3 Fatty Acids (FISH OIL PO)  Self Yes Yes   Sig: Take 2,000 mg by mouth 2 (two) times a day   Red Yeast Rice Extract (RED YEAST RICE PO)  Self Yes Yes   Sig: Take 1,200 mg by mouth 2 (two) times a day   aspirin 81 MG tablet  Self Yes Yes   Sig: Take 81 mg by mouth daily   azelastine (ASTELIN) 0 1 % nasal spray  Self Yes Yes   Si sprays into each nostril daily Use in each nostril as directed   gabapentin (NEURONTIN) 300 mg capsule  Self No Yes   Si cap qd x 3 days, then increase to 1 BID   Patient taking differently: 2 (two) times a day 1 cap qd x 3 days, then increase to 1 BID   levothyroxine 50 mcg tablet  Self No Yes   Sig: Take 1 tablet (50 mcg total) by mouth daily   loratadine (CLARITIN) 10 mg tablet  Self Yes Yes   Sig: Take 10 mg by mouth daily      Facility-Administered Medications: None       Past Medical History:   Diagnosis Date    Abnormal blood chemistry     last assessed: 2014    Allergic reaction     last assessed: 2012    Allergic rhinitis     last assessed: 2014    Arthralgia of left shoulder region     last assessed: 12/3/2013    Arthritis     Dysautonomia (HCC)     Elevated blood pressure reading without diagnosis of hypertension     last assessed: 2016    Endometriosis     Familial combined hyperlipidemia     last assessed: 2014    Female infertility     Fibroid     Functional murmur     description: soft systolic murmur - no valvular disease on 2D echo Last assessed: 2014    Hypertension     Obstruction of eustachian tube     unspecified laterality Last assessed: 3/17/2014    Polycystic ovary syndrome     PONV (postoperative nausea and vomiting)     Subclinical hypothyroidism 2019    Symptomatic bradycardia     Syncope     Wears glasses        Past Surgical History:   Procedure Laterality Date    APPENDECTOMY      CARDIAC CATHETERIZATION      Description: Normal EF, No obstructive coronary artery disease, systolic hypertension  Yenni Multani done at [facility] Resolved: 1998    CARDIAC PACEMAKER PLACEMENT Left      SECTION      COLONOSCOPY      DEBRIDEMENT TENNIS ELBOW      GANGLION CYST EXCISION Left     hand    HYSTERECTOMY      KNEE ARTHROSCOPY Right     NM SHLDR ARTHROSCOP,SURG,W/ROTAT CUFF REPR Left 10/3/2016    Procedure: ARTHROSCOPY SHOULDER, ROTATOR CUFF REPAIR,SUBACROMIAL DECOMPRESSION ; MICROFRACTURE MIKAELA GLENOID HEAD;  Surgeon: Rebekah Villafana MD;  Location: [facility];  Service: Orthopedics    TONSILLECTOMY      WISDOM TOOTH EXTRACTION         Family History   Problem Relation Age of Onset    Breast cancer Mother 76    Ovarian cancer Mother 80    Multiple myeloma Mother     Cancer Father         liver    Allergies Family         bronchitis    Diabetes Family     Seizures Family     Heart disease Family     Hypertension Family     Skin cancer Family     No Known Problems Sister     No Known Problems Daughter     Breast cancer additional onset Maternal Grandmother     Cancer Maternal Grandfather         thyroid    No Known Problems Sister     No Known Problems Sister     No Known Problems Daughter     No Known Problems Maternal Aunt     No Known Problems Paternal Aunt     Cancer Paternal Aunt         liver     I have reviewed and agree with the history as documented      Social History     Tobacco Use    Smoking status: Never Smoker    Smokeless tobacco: Never Used   Substance Use Topics    Alcohol use: Yes     Frequency: 2-3 times a week     Drinks per session: 3 or 4     Comment: 1-2 x per year Per allscripts - no alcohol use     Drug use: No        Review of Systems   Constitutional: Negative for diaphoresis, fever and unexpected weight change  HENT: Negative for congestion, rhinorrhea and sore throat  Eyes: Negative for pain, discharge and visual disturbance  Respiratory: Positive for cough and shortness of breath  Negative for wheezing  Cardiovascular: Negative for chest pain, palpitations and leg swelling  Gastrointestinal: Negative for abdominal pain, blood in stool, constipation, diarrhea, nausea and vomiting  Genitourinary: Negative for dysuria, flank pain and hematuria  Musculoskeletal: Negative for arthralgias and joint swelling  Skin: Negative for rash and wound  Allergic/Immunologic: Negative for environmental allergies and food allergies  Neurological: Negative for dizziness, seizures, weakness and numbness  Hematological: Negative for adenopathy  Psychiatric/Behavioral: Negative for confusion and hallucinations  Physical Exam  ED Triage Vitals   Temperature Pulse Respirations Blood Pressure SpO2   01/10/20 0849 01/10/20 0849 01/10/20 0849 01/10/20 0849 01/10/20 0849   97 9 °F (36 6 °C) 65 18 159/76 100 %      Temp Source Heart Rate Source Patient Position - Orthostatic VS BP Location FiO2 (%)   01/10/20 0849 01/10/20 0849 01/10/20 0849 01/10/20 0849 01/10/20 1703   Oral Monitor Sitting Right arm 30      Pain Score       01/10/20 0849       No Pain             Orthostatic Vital Signs  Vitals:    01/10/20 1300 01/10/20 1400 01/10/20 1703 01/10/20 1822   BP: 118/72 125/73 130/62 142/69   Pulse: 64 65 70 71   Patient Position - Orthostatic VS:  Sitting  Lying       Physical Exam   Constitutional: She is oriented to person, place, and time  She appears well-developed and well-nourished  No distress  HENT:   Head: Normocephalic and atraumatic     Right Ear: External ear normal    Left Ear: External ear normal    Eyes: Pupils are equal, round, and reactive to light  Conjunctivae and EOM are normal    Neck: Normal range of motion  Neck supple  Cardiovascular: Normal rate and regular rhythm  Murmur heard  Pulmonary/Chest: Effort normal and breath sounds normal  No respiratory distress  She has no wheezes  She has no rales  Abdominal: Soft  Bowel sounds are normal  She exhibits no distension  There is no tenderness  There is no guarding  Musculoskeletal: Normal range of motion  She exhibits no deformity  Neurological: She is alert and oriented to person, place, and time  No gross motor deficits noted  Cranial nerves II-XII are intact  Speech is fluent without dysarthria or aphasia  Skin: Skin is warm and dry  Capillary refill takes less than 2 seconds  Psychiatric: She has a normal mood and affect  Her behavior is normal    Nursing note and vitals reviewed        ED Medications  Medications   aspirin chewable tablet 324 mg (324 mg Oral Given 1/10/20 0949)   ceFAZolin (ANCEF) IVPB (premix) 2,000 mg (0 mg Intravenous Stopped 1/10/20 1332)   lidocaine (PF) (XYLOCAINE-MPF) 1 % injection (20 mL Infiltration Given 1/10/20 1614)   lidocaine (PF) (XYLOCAINE-MPF) 1 % injection (5 mL Infiltration Given 1/10/20 1619)   gentamicin (GARAMYCIN) 40 mg/mL injection (80 mg Irrigation Given 1/10/20 1638)       Diagnostic Studies  Results Reviewed     Procedure Component Value Units Date/Time    Troponin I [[id number]]  (Normal) Collected:  01/10/20 1154    Lab Status:  Final result Specimen:  Blood from Arm, Left Updated:  01/10/20 1331     Troponin I <0 02 ng/mL     Troponin I [[id number]]  (Normal) Collected:  01/10/20 0851    Lab Status:  Final result Specimen:  Blood from Arm, Left Updated:  01/10/20 0921     Troponin I <0 02 ng/mL     Basic metabolic panel [[id number]]  (Abnormal) Collected:  01/10/20 0851    Lab Status:  Final result Specimen:  Blood from Arm, Left Updated:  01/10/20 8824     Sodium 140 mmol/L      Potassium 3 8 mmol/L      Chloride 109 mmol/L      CO2 25 mmol/L      ANION GAP 6 mmol/L      BUN 18 mg/dL      Creatinine 1 15 mg/dL      Glucose 102 mg/dL      Calcium 9 0 mg/dL      eGFR 53 ml/min/1 73sq m     Narrative:       [place] guidelines for Chronic Kidney Disease (CKD):     Stage 1 with normal or high GFR (GFR > 90 mL/min/1 73 square meters)    Stage 2 Mild CKD (GFR = 60-89 mL/min/1 73 square meters)    Stage 3A Moderate CKD (GFR = 45-59 mL/min/1 73 square meters)    Stage 3B Moderate CKD (GFR = 30-44 mL/min/1 73 square meters)    Stage 4 Severe CKD (GFR = 15-29 mL/min/1 73 square meters)    Stage 5 End Stage CKD (GFR <15 mL/min/1 73 square meters)  Note: GFR calculation is accurate only with a steady state creatinine    CBC and differential [[id number]] Collected:  01/10/20 0851    Lab Status:  Final result Specimen:  Blood from Arm, Left Updated:  01/10/20 0902     WBC 6 51 Thousand/uL      RBC 4 27 Million/uL      Hemoglobin 12 8 g/dL      Hematocrit 38 7 %      MCV 91 fL      MCH 30 0 pg      MCHC 33 1 g/dL      RDW 13 2 %      MPV 10 1 fL      Platelets 074 Thousands/uL      nRBC 0 /100 WBCs      Neutrophils Relative 51 %      Immat GRANS % 0 %      Lymphocytes Relative 38 %      Monocytes Relative 8 %      Eosinophils Relative 2 %      Basophils Relative 1 %      Neutrophils Absolute 3 30 Thousands/µL      Immature Grans Absolute 0 02 Thousand/uL      Lymphocytes Absolute 2 49 Thousands/µL      Monocytes Absolute 0 50 Thousand/µL      Eosinophils Absolute 0 15 Thousand/µL      Basophils Absolute 0 05 Thousands/µL                  XR chest 1 view portable   ED Interpretation by David Oneill MD (01/10 4449)   Pacemaker with atrial and ventricular leads  Lungs are clear        Final Result by Lexi Khan MD (01/10 9908)      No acute consolidation or congestion   Hypoinflated lungs   Mild groundglass haze in the lungs likely related to hypoinflation         Workstation performed: [id number]               Procedures  ECG 12 Lead Documentation Only  Date/Time: 1/10/2020 12:20 PM  Performed by: Mathieu Hodge MD  Authorized by: Mathieu Hodge MD     ECG reviewed by me, the ED Provider: yes    Patient location:  ED  Previous ECG:     Previous ECG:  Compared to current    Comparison ECG info:  September 29, 2016    Similarity:  Changes noted    Comparison to cardiac monitor: Yes    Interpretation:     Interpretation: abnormal    Rate:     ECG rate:  66    ECG rate assessment: normal    Rhythm:     Rhythm: sinus rhythm    Ectopy:     Ectopy: none    QRS:     QRS axis:  Normal    QRS intervals:  Normal  Conduction:     Conduction: normal    ST segments:     ST segments:  Normal  T waves:     T waves: inverted    Q waves:     Q waves:  II, III, aVF, V4, V5 and V6          ED Course  ED Course as of Jan 11 0849   Fri James 10, 2020   5777 EP contacted by Dr Anshul Lucero  He spoke with Wood Alatorre PA-C   EP will come evaluate the patient at this time  7594 Troponin I: <0 02   0924 Stable renal function  Creatinine: 1 15   1056 Spoke with EP FABRIZIO Sanchez regarding the plan  She ordered an echocardiogram and will call echo tech in order to ensure that it is done soon  If the echocardiogram appears normal, the patient will be taken for pacemaker generator change  She may be discharged after this if she does well during 2 hour postprocedure observation  1211 Echocardiogram to be performed at 1200       1212 Repeat EKG demonstrated ventricular paced rhythm with no evidence of ischemia  1335 Troponin I: <0 02   1449 Patient in EP lab  MDM  Number of Diagnoses or Management Options  Disorder of cardiac pacemaker system, initial encounter: established and worsening  Dizziness: established and worsening  Diagnosis management comments:     Patient presented with worsening dizziness and dyspnea    Patient had prior history of symptomatic bradycardia status post pacemaker placement in 1998 with generator change in 2007  Patient had been evaluated by EP who felt that her symptoms were secondary to pacemaker syndrome from the pacemaker switching to VVI mode  Patient was seen immediately on arrival   EKG demonstrated T-wave inversions in the inferior and lateral leads  Cardiac workup was ordered and was unremarkable  EP was contacted and evaluated the patient  Echocardiogram was ordered per EP which demonstrated EF of 55% with grade 2 diastolic dysfunction  Patient was taken to the EP lab before generator change  Patient was signed out pending completion of procedure and postprocedure observation  Patient ultimately discharged by Dr Jesus Bar  Amount and/or Complexity of Data Reviewed  Clinical lab tests: ordered and reviewed  Tests in the radiology section of CPT®: ordered and reviewed  Decide to obtain previous medical records or to obtain history from someone other than the patient: yes  Review and summarize past medical records: yes  Discuss the patient with other providers: yes  Independent visualization of images, tracings, or specimens: yes    Risk of Complications, Morbidity, and/or Mortality  Presenting problems: moderate  Diagnostic procedures: minimal  Management options: minimal    Patient Progress  Patient progress: improved        Disposition  Final diagnoses:   Dizziness   Disorder of cardiac pacemaker system, initial encounter     Time reflects when diagnosis was documented in both MDM as applicable and the Disposition within this note     Time User Action Codes Description Comment    1/10/2020  6:36 PM Check, Mimetas Add [R42] Dizziness     1/10/2020  6:36 PM Check, Curious Sensebubba Silversky Add [T82  9XXA] Disorder of cardiac pacemaker system, initial encounter       ED Disposition     ED Disposition Condition Date/Time Comment    Discharge Stable Fri James 10, 2020  6:38 PM Анна Peterson discharge to home/self care             Follow-up Information     Follow up With Specialties Details Why Contact Info Additional Information    Ayo Whittaker   Follow up 1/24/2020 - 11:00AM  THIS IS AN APPOINTMENT FOR SITE CHECK  PLEASE CALL THE OFFICE TO RESCHEDULE IF NECESSARY AT: [phone]  *THIS IS NOT AN APPOINTMENT WITH THE PHYSICIAN, ONLY A FOLLOW UP WITH OUR DEVICE CLINIC  * [address] [place] [id number]  [phone]       [address] [place] [address] Emergency Department Emergency Medicine  If symptoms worsen [address] [id number]  [phone]  ED, [address] [place] [address], [address]   [phone]          Discharge Medication List as of 1/10/2020  6:38 PM      CONTINUE these medications which have NOT CHANGED    Details   aspirin 81 MG tablet Take 81 mg by mouth daily, Until Discontinued, Historical Med      azelastine (ASTELIN) 0 1 % nasal spray 2 sprays into each nostril daily Use in each nostril as directed, Until Discontinued, Historical Med      Cholecalciferol (VITAMIN D-3 PO) Take 4,000 Units by mouth daily, Until Discontinued, Historical Med      EPINEPHrine (EPIPEN) 0 3 mg/0 3 mL SOAJ Inject 0 3 mL (0 3 mg total) into a muscle as needed for anaphylaxis (twin pack), Starting Tue 10/1/2019, Normal      FLUTICASONE PROPIONATE, NASAL, NA 2 sprays into each nostril as needed  , Historical Med      gabapentin (NEURONTIN) 300 mg capsule 1 cap qd x 3 days, then increase to 1 BID, Normal      levothyroxine 50 mcg tablet Take 1 tablet (50 mcg total) by mouth daily, Starting Fri 9/27/2019, Normal      loratadine (CLARITIN) 10 mg tablet Take 10 mg by mouth daily, Until Discontinued, Historical Med      Omega-3 Fatty Acids (FISH OIL PO) Take 2,000 mg by mouth 2 (two) times a day, Until Discontinued, Historical Med      Red Yeast Rice Extract (RED YEAST RICE PO) Take 1,200 mg by mouth 2 (two) times a day, Until Discontinued, Historical Med           No discharge procedures on file  ED Provider  Attending physically available and evaluated Queenie Muñoz I managed the patient along with the ED Attending      Electronically Signed by         Christopher Pratt MD  01/11/20 3985

## 2020-01-10 NOTE — DISCHARGE INSTRUCTIONS
PLEASE HOLD FISH OIL FOR TWO WEEKS  Please refer to post pacemaker implantation discharge instructions and restrictions and your pacemaker booklet/temporary card  Keep incision dry for one week  Do not use lotions/powders/creams on incision  Remove outer bandage 48 hours after implantation  Leave underlying steri-strips in place, they will either fall off on their own or will be removed at 2 week follow up appointment  No overhead reaching/pushing/pulling/lifting greater than 5-10lbs with left arm for one month  Please call the office if you notice redness, swelling, bleeding, or drainage from incision or if you develop fevers  AFTER PACEMAKER CARE:    If you have any questions, please call [phone] to speak with a nurse (8:30am-4pm, or [phone] after hours)  For appointments, please call [phone]  WHAT YOU SHOULD KNOW:   A pacemaker is a small, battery-powered device that is placed under your skin in your upper chest area with wires placed through a vein that lead directly into the heart  It helps regulate your heart rate and prevent your heart from beating too slowly  AFTER YOU LEAVE:     Medicines:     Pain medicine: You may need medicine to take away or decrease pain  Learn how to take your medicine  Ask what medicine and how much you should take  Be sure you know how, when, and how often to take it  Usually Over the counter pain medicine is sufficient to control pain (Acetominophen or Ibuprofen) Ask your doctor if you may take these  If this does not control your pain, narcotic pain killers may be prescribed, please call if you need prescription  Do not wait until the pain is severe before you take your medicine  Tell caregivers if your pain does not decrease  Pain medicine can make you dizzy or sleepy  Prevent falls by calling someone when you get out of bed or if you need help  Take your medicine as directed    Call your healthcare provider if you think your medicine is not helping or if you have side effects  Tell him if you are allergic to any medicine  Follow up with your cardiologist after your procedure: You will need a follow-up visit approximately 2 weeks after you leave the hospital  Your cardiologist will check your wound and make sure that your pacemaker is working correctly  Follow the instructions to check your pacemaker: Your cardiologist or primary healthcare provider will check your pacemaker and the battery regularly  He will use a computer to check your pacemaker over the telephone or wireless device which will be given to you  Pacemaker batteries usually last 5 to 10 years  The pacemaker unit will be replaced when the battery gets low  This is a simpler procedure than the original one to implant your pacemaker  Wound care:  Keep your incision dry for one week  Sponge/tub baths are preferred, try to avoid a shower for 7 days  Do not use lotions/powders/creams on incision  Remove outer bandage 48 hours after implantation  Leave underlying steri-strips in place, they will either fall off on their own or will be removed at 2 week follow up appointment  Please call the office if you notice redness, swelling, bleeding, or drainage from incision or if you develop fevers  Activity:   Arm movement and lifting:  Be careful using the arm on the side of your pacemaker  Do not move your arm for the first 24 hours after your procedure  Do not  lift your arm above your shoulder or lift more than 10 pounds for one month after your procedure  Avoid pushing, pulling, or repetitive arm movements for one month  This helps the leads stay in place and helps your wound heal  Ask your caregiver when you can drive after your procedure  You may move your arm side to side without lifting above your shoulder, and do not need to wear a sling at home     Typically driving is allowed after 1 week post pacemaker if you are stable, however in some cases it may be longer and you should discuss with your doctor before proceeding  Sports:  Ask your caregiver when it is okay to play tennis, golf, basketball, or any sport that requires you to lift your arms  Do not play full contact sports, such as football, that could damage your pacemaker  Ask your cardiologist or primary healthcare provider how much and what kinds of physical activity are safe for you  Living with a pacemaker:   Tell all caregivers you have a pacemaker: This includes surgeons, radiologists, and medical technicians  You may want to wear a medical alert ID bracelet or necklace that states that you have a pacemaker  Carry your pacemaker ID card: Make sure you receive a pacemaker ID card  Carry it with you at all times  It lists important information about your pacemaker  Show it to airport security if you travel  Avoid electrical interference:  Avoid welding equipment and other equipment with large magnets or electric fields  These things could interfere with how your pacemaker works  Use your cell phone on the ear opposite from your pacemaker  Do not carry your cell phone in your shirt pocket over your chest      Some Pacemakers are MRI safe  Ask you doctor if it is safe to proceed with MRI and let the radiologist and staff know you have a pacemaker  Do not touch the skin around your pacemaker: This can cause damage to the lead wires or move the pacemaker unit from where it should be  Contact your cardiologist or primary healthcare provider if:   The area around your pacemaker has increasing amount of pain after surgery  The pain should improve over first few days after implantation  The skin around your stitches has increasing redness, swelling, or has drainage  This may mean that you have an infection  You have a fever  You have chills, a cough, and feel weak or achy  These are also signs of infection  Your feet or ankles are more swollen than your baseline  Your Heart rate is less than 50 beats per minute     Seek care immediately if:   Your bandage becomes soaked with blood  Your pacemaker is swelling rapidly    Your stitches open up  You feel your heart suddenly beating very slowly or quickly  You become too weak or dizzy to stand, or you pass out  Your arm or leg feels warm, tender, and painful  It may look swollen and red  You have chest pain that does not go away with rest or medicine  You feel lightheaded, short of breath, and have chest pain  You cough up blood  © 2014 [address] is for End User's use only and may not be sold, redistributed or otherwise used for commercial purposes  All illustrations and images included in CareNotes® are the copyrighted property of [employer]  or [employer]  The above information is an  only  It is not intended as medical advice for individual conditions or treatments  Talk to your doctor, nurse or pharmacist before following any medical regimen to see if it is safe and effective for you

## 2020-01-10 NOTE — ED NOTES
Report from Shakira Aguilar in [facility] lab  Pt transferred back to ER 27 by ashok RINCON&Ox4  Will continue to monitor anesthesia recovery        Alesha Cyr RN  01/10/20 [id number]

## 2020-01-10 NOTE — ANESTHESIA PREPROCEDURE EVALUATION
Review of Systems/Medical History  Patient summary reviewed  Chart reviewed  History of anesthetic complications PONV    Cardiovascular  EKG reviewed, Pacemaker/AICD, Hyperlipidemia, Hypertension ,   Comment: Normal sinus rhythm  ST & Marked T wave abnormality, consider inferior ischemia  Abnormal ECG  When compared with ECG of 29-SEP-2016 09:48,  Non-specific change in ST segment in Inferior leads  T wave inversion now evident in Inferior leads  T wave inversion now evident in Anterolateral leads  Confirmed by Sen Machado ([id number]) on 1/10/2020 8:55:56 AM      Symptomatic bradycardia    PPM interrogation:    MDT DUAL CHAMBER PM  DEVICE INTERROGATED IN THE [facility] OFFICE (NO TEST): PT CALLED WITH C/O SUDDEN INCREASED EPISODES OF NEAR SYNCOPE AS BEFORE PPM IMPLANT  BATTERY VOLTAGE REACHED RRT (19 - 2 62 V) WHICH COORELATES WITH ONSET OF SXS   PT SCHEDULED FOR H&P WITH DR CANTU 1/10/2020 1:00 P  Liat Done OFFICE   0 6%  ALL EGRM STORAGE PROGRAMMED OFF DUE TO BATTERY STATUS  NORMAL DEVICE FUNCTION @ RRT BATTERY STATUS   EB      ,  Pulmonary       GI/Hepatic            Endo/Other  History of thyroid disease , hypothyroidism,      GYN  , Prior pregnancy/OB history : 5 Parity: 4,          Hematology   Musculoskeletal    Arthritis     Neurology   Psychology       PSH:     SECTION APPENDECTOMY   GANGLION CYST EXCISION DEBRIDEMENT TENNIS ELBOW   KNEE ARTHROSCOPY CARDIAC PACEMAKER PLACEMENT   WISDOM TOOTH EXTRACTION COLONOSCOPY   TONSILLECTOMY WI SHLDR ARTHROSCOP,SURG,W/ROTAT CUFF REPR   CARDIAC CATHETERIZATION HYSTERECTOMY         Physical Exam    Airway    Mallampati score: II  TM Distance: >3 FB  Neck ROM: full     Dental   No notable dental hx     Cardiovascular  Cardiovascular exam normal    Pulmonary  Pulmonary exam normal Breath sounds clear to auscultation,     Other Findings        Anesthesia Plan  ASA Score- 3     Anesthesia Type- IV sedation with anesthesia with ASA Monitors  Additional Monitors:   Airway Plan:         Plan Factors- Patient instructed to abstain from smoking on day of procedure       Induction- intravenous  Postoperative Plan-     Informed Consent- Anesthetic plan and risks discussed with patient  I personally reviewed this patient with the CRNA  Discussed and agreed on the Anesthesia Plan with the CRNA             Lab Results   Component Value Date    WBC 6 51 01/10/2020    HGB 12 8 01/10/2020    HCT 38 7 01/10/2020    MCV 91 01/10/2020     01/10/2020     Lab Results   Component Value Date    GLUCOSE 98 12/17/2015    CALCIUM 9 0 01/10/2020     12/17/2015    K 3 8 01/10/2020    CO2 25 01/10/2020     (H) 01/10/2020    BUN 18 01/10/2020    CREATININE 1 15 01/10/2020     Lab Results   Component Value Date    INR 0 98 09/29/2016    PROTIME 13 0 09/29/2016     Lab Results   Component Value Date    PTT 28 09/29/2016           Discussed with pt the benefits/alternatives and risks or General Anesthesia including breathing tube remaining in place if not strong enough, PONV, damage to lips and teeth, sore throat, eye injury or blindness    I, Dr Barbie Obregon, the attending physician, have personally seen and evaluated the patient prior to anesthetic care  I have reviewed the pre-anesthetic record, and other medical records if appropriate to the anesthetic care  If a CRNA is involved in the case, I have reviewed the CRNA assessment, if present, and agree  The patient is in a suitable condition to proceed with my formulated anesthetic plan

## 2020-01-10 NOTE — ANESTHESIA POSTPROCEDURE EVALUATION
Post-Op Assessment Note    CV Status:  Stable  Pain Score: 0    Pain management: adequate     Mental Status:  Alert and awake   Hydration Status:  Stable   PONV Controlled:  None   Airway Patency:  Patent   Post Op Vitals Reviewed: Yes      Staff: CRNA   Comments: Pt  to return back to ER; Fully AAO x3; Course uneventful; VSS; Airway patent   No c/o Pain or PONV           /62 (01/10/20 1703)    Temp      Pulse 70 (01/10/20 1703)   Resp 18 (01/10/20 1703)    SpO2 99 % (01/10/20 1703)

## 2020-01-12 NOTE — ED ATTENDING ATTESTATION
1/10/2020  IGeorgette MD, saw and evaluated the patient  I have discussed the patient with the resident/non-physician practitioner and agree with the resident's/non-physician practitioner's findings, Plan of Care, and MDM as documented in the resident's/non-physician practitioner's note, except where noted  All available labs and Radiology studies were reviewed  I was present for key portions of any procedure(s) performed by the resident/non-physician practitioner and I was immediately available to provide assistance  At this point I agree with the current assessment done in the Emergency Department  I have conducted an independent evaluation of this patient a history and physical is as follows:    82-year-old woman status post pacemaker placement in 1998 presenting with shortness of breath  She just saw her electrophysiologist who told her the battery was at the end of its life and the pacer is frequently not working or is only providing ventricular pacing but no atrial pacing  Patient denies any chest or back pain  No cough  No leg pain or swelling  On initial evaluation she is awake and alert, appears uncomfortable with increased work of breathing  Heart regular rate rhythm, no murmurs rubs or gallops  Lungs are clear to auscultation bilaterally  Abdomen soft, nontender, nondistended  Electrophysiology was consulted and they took the patient for battery replacement  Patient improved afterwards and ultimately discharged  ED Course  ED Course as of James 12 0011   Fri James 10, 2020   4532 Paged electrophysiology, spoke with Rob De Jesus who called back quickly, they will come evaluate the patient  HR currently between 60-70 with stable blood pressure              Critical Care Time  Procedures

## 2020-01-13 ENCOUNTER — TRANSCRIBE ORDERS (OUTPATIENT)
Dept: LAB | Facility: HOSPITAL | Age: 60
End: 2020-01-13

## 2020-01-13 ENCOUNTER — APPOINTMENT (OUTPATIENT)
Dept: LAB | Facility: HOSPITAL | Age: 60
End: 2020-01-13
Payer: COMMERCIAL

## 2020-01-13 DIAGNOSIS — E78.2 MIXED HYPERLIPIDEMIA: ICD-10-CM

## 2020-01-13 DIAGNOSIS — E55.9 VITAMIN D DEFICIENCY: ICD-10-CM

## 2020-01-13 DIAGNOSIS — R73.09 ABNORMAL BLOOD SUGAR: ICD-10-CM

## 2020-01-13 DIAGNOSIS — E03.8 SUBCLINICAL HYPOTHYROIDISM: ICD-10-CM

## 2020-01-13 LAB
25(OH)D3 SERPL-MCNC: 47.9 NG/ML (ref 30–100)
ALBUMIN SERPL BCP-MCNC: 3.3 G/DL (ref 3.5–5)
ALP SERPL-CCNC: 79 U/L (ref 46–116)
ALT SERPL W P-5'-P-CCNC: 24 U/L (ref 12–78)
ANION GAP SERPL CALCULATED.3IONS-SCNC: 3 MMOL/L (ref 4–13)
AST SERPL W P-5'-P-CCNC: 16 U/L (ref 5–45)
BILIRUB SERPL-MCNC: 0.45 MG/DL (ref 0.2–1)
BUN SERPL-MCNC: 19 MG/DL (ref 5–25)
CALCIUM SERPL-MCNC: 9.4 MG/DL (ref 8.3–10.1)
CHLORIDE SERPL-SCNC: 108 MMOL/L (ref 100–108)
CHOLEST SERPL-MCNC: 206 MG/DL (ref 50–200)
CO2 SERPL-SCNC: 28 MMOL/L (ref 21–32)
CREAT SERPL-MCNC: 1.02 MG/DL (ref 0.6–1.3)
GFR SERPL CREATININE-BSD FRML MDRD: 61 ML/MIN/1.73SQ M
GLUCOSE P FAST SERPL-MCNC: 105 MG/DL (ref 65–99)
HDLC SERPL-MCNC: 56 MG/DL
LDLC SERPL CALC-MCNC: 137 MG/DL (ref 0–100)
POTASSIUM SERPL-SCNC: 4.7 MMOL/L (ref 3.5–5.3)
PROT SERPL-MCNC: 7.3 G/DL (ref 6.4–8.2)
SODIUM SERPL-SCNC: 139 MMOL/L (ref 136–145)
TRIGL SERPL-MCNC: 64 MG/DL
TSH SERPL DL<=0.05 MIU/L-ACNC: 2.16 UIU/ML (ref 0.36–3.74)

## 2020-01-13 PROCEDURE — 80061 LIPID PANEL: CPT

## 2020-01-13 PROCEDURE — 84443 ASSAY THYROID STIM HORMONE: CPT

## 2020-01-13 PROCEDURE — 36415 COLL VENOUS BLD VENIPUNCTURE: CPT

## 2020-01-13 PROCEDURE — 80053 COMPREHEN METABOLIC PANEL: CPT

## 2020-01-13 PROCEDURE — 82306 VITAMIN D 25 HYDROXY: CPT

## 2020-01-16 ENCOUNTER — OFFICE VISIT (OUTPATIENT)
Dept: FAMILY MEDICINE CLINIC | Facility: CLINIC | Age: 60
End: 2020-01-16
Payer: COMMERCIAL

## 2020-01-16 VITALS
BODY MASS INDEX: 39.24 KG/M2 | TEMPERATURE: 97.4 F | HEART RATE: 55 BPM | OXYGEN SATURATION: 98 % | WEIGHT: 250 LBS | SYSTOLIC BLOOD PRESSURE: 126 MMHG | RESPIRATION RATE: 17 BRPM | DIASTOLIC BLOOD PRESSURE: 70 MMHG | HEIGHT: 67 IN

## 2020-01-16 DIAGNOSIS — B02.29 POST HERPETIC NEURALGIA: ICD-10-CM

## 2020-01-16 DIAGNOSIS — E78.2 MIXED HYPERLIPIDEMIA: ICD-10-CM

## 2020-01-16 DIAGNOSIS — R73.09 ABNORMAL BLOOD SUGAR: ICD-10-CM

## 2020-01-16 DIAGNOSIS — Z13.0 SCREENING FOR DEFICIENCY ANEMIA: ICD-10-CM

## 2020-01-16 DIAGNOSIS — E55.9 VITAMIN D DEFICIENCY: ICD-10-CM

## 2020-01-16 DIAGNOSIS — E03.8 SUBCLINICAL HYPOTHYROIDISM: Primary | ICD-10-CM

## 2020-01-16 DIAGNOSIS — B02.9 HERPES ZOSTER WITHOUT COMPLICATION: ICD-10-CM

## 2020-01-16 DIAGNOSIS — R00.1 SINUS BRADYCARDIA: ICD-10-CM

## 2020-01-16 PROCEDURE — 99214 OFFICE O/P EST MOD 30 MIN: CPT | Performed by: FAMILY MEDICINE

## 2020-01-16 RX ORDER — LEVOTHYROXINE SODIUM 0.05 MG/1
50 TABLET ORAL DAILY
Qty: 90 TABLET | Refills: 1 | Status: SHIPPED | OUTPATIENT
Start: 2020-01-16 | End: 2020-07-29 | Stop reason: SDUPTHER

## 2020-01-16 NOTE — ASSESSMENT & PLAN NOTE
BMI 39 16  Patient is still following 1400 calorie per day diet  But she has not been exercising lately  Due to work demands    We discussed increasing this

## 2020-01-16 NOTE — ASSESSMENT & PLAN NOTE
Shingles left flank December 2019  Overall much improved, but still with mild pain  Still taking gabapentin 300 b i d  Recommend discontinue when she runs out of her current supply    Call if symptoms persist   I would like to give her Shingrix vaccine at her next appointment

## 2020-01-16 NOTE — ASSESSMENT & PLAN NOTE
Blood sugar from January 13th 105  Continue reduced carb diet  Increase exercise    Will continue to monitor

## 2020-01-16 NOTE — PROGRESS NOTES
[facility]      NAME: Jarod Valverde  AGE: 64 y o  SEX: female  : 1960   MRN: [id number]    DATE: 2020  TIME: 5:18 PM    Assessment and Plan     Problem List Items Addressed This Visit     Mixed hyperlipidemia      Cholesterol 206/137 with HDL of 56  Continue diet  Recommend increasing exercise         Relevant Orders    Lipid Panel with Direct LDL reflex    Sinus bradycardia      Status post pacemaker insertion due to syncopal episode in   Patient subsequently had generator replaced in   Recently she became symptomatic again and required another generator replacement  She is feeling well currently with only mild soreness at her incision site  Continue follow-up with cardiologist as directed         BMI 39 0-39 9,adult      BMI 39 16  Patient is still following 1400 calorie per day diet  But she has not been exercising lately  Due to work demands  We discussed increasing this         Abnormal blood sugar      Blood sugar from   Continue reduced carb diet  Increase exercise  Will continue to monitor         Relevant Orders    Comprehensive metabolic panel    Hemoglobin A1C    Subclinical hypothyroidism - Primary      TSH 2 1  Doing well on levothyroxine 50 mcg daily  Will continue to monitor         Relevant Medications    levothyroxine 50 mcg tablet    Other Relevant Orders    TSH, 3rd generation with Free T4 reflex    Vitamin D deficiency      Vitamin-D 47  Doing well on OTC vitamin-D 2000 International Units daily  Will continue to monitor         Herpes zoster without complication      Shingles left flank 2019  Overall much improved, but still with mild pain  Still taking gabapentin 300 b i d  Recommend discontinue when she runs out of her current supply    Call if symptoms persist   I would like to give her Shingrix vaccine at her next appointment           Other Visit Diagnoses     Screening for deficiency anemia        Relevant Orders    CBC and differential    Post herpetic neuralgia                  Return to office in:  [address] 6 months, fasting  blood work prior at [facility] Complaint   Patient presents with    Follow-up     6m check up to chronic conditions and to discuss blood work from 1/13/2020       History of Present Illness       Patient presents for recheck of chronic medical problems today  Patient recently needed to have her pacemaker generator replaced  Otherwise she is doing well  Doing well on levothyroxine  Doing well on vitamin-D  Patient still with mild ongoing pain from her shingles  She is still taking gabapentin      The following portions of the patient's history were reviewed and updated as appropriate: allergies, current medications, past family history, past medical history, past social history, past surgical history and problem list     Review of Systems   Review of Systems   Respiratory: Negative  Cardiovascular: Negative  Gastrointestinal: Negative  Genitourinary: Negative  Active Problem List     Patient Active Problem List   Diagnosis    Mixed hyperlipidemia    Sinus bradycardia    Left hand paresthesia    Cervical radiculopathy    BMI 39 0-39 9,adult    Abnormal blood sugar    Allergic rhinitis    Allergic reaction    Subclinical hypothyroidism    Pacemaker    Vitamin D deficiency    Hand pain, right    Herpes zoster without complication       Objective   /70 (BP Location: Left arm, Patient Position: Sitting, Cuff Size: Large)   Pulse 55   Temp (!) 97 4 °F (36 3 °C) (Tympanic)   Resp 17   Ht 5' 7" (1 702 m)   Wt 113 kg (250 lb)   LMP  (LMP Unknown)   SpO2 98%   Breastfeeding? No   BMI 39 16 kg/m²     Physical Exam   Cardiovascular: Normal rate, regular rhythm, normal heart sounds and intact distal pulses  Carotids: no bruits  Ext: no edema   Pulmonary/Chest: Effort normal  No respiratory distress  She has no wheezes   She has no rales  Psychiatric: She has a normal mood and affect   Her behavior is normal  Thought content normal        Pertinent Laboratory/Diagnostic Studies:    Lab results reviewed    Current Medications     Current Outpatient Medications:     aspirin 81 MG tablet, Take 81 mg by mouth daily, Disp: , Rfl:     azelastine (ASTELIN) 0 1 % nasal spray, 2 sprays into each nostril daily Use in each nostril as directed, Disp: , Rfl:     Cholecalciferol (VITAMIN D-3 PO), Take 4,000 Units by mouth daily, Disp: , Rfl:     EPINEPHrine (EPIPEN) 0 3 mg/0 3 mL SOAJ, Inject 0 3 mL (0 3 mg total) into a muscle as needed for anaphylaxis (twin pack), Disp: 2 each, Rfl: 3    FLUTICASONE PROPIONATE, NASAL, NA, 2 sprays into each nostril as needed  , Disp: , Rfl:     gabapentin (NEURONTIN) 300 mg capsule, 1 cap qd x 3 days, then increase to 1 BID (Patient taking differently: 2 (two) times a day 1 cap qd x 3 days, then increase to 1 BID), Disp: 60 capsule, Rfl: 0    levothyroxine 50 mcg tablet, Take 1 tablet (50 mcg total) by mouth daily, Disp: 90 tablet, Rfl: 1    loratadine (CLARITIN) 10 mg tablet, Take 10 mg by mouth daily, Disp: , Rfl:     Omega-3 Fatty Acids (FISH OIL PO), Take 2,000 mg by mouth 2 (two) times a day, Disp: , Rfl:     Red Yeast Rice Extract (RED YEAST RICE PO), Take 1,200 mg by mouth 2 (two) times a day, Disp: , Rfl:     Health Maintenance     Health Maintenance   Topic Date Due    DTaP,Tdap,and Td Vaccines (1 - Tdap) 04/27/1973    BMI: Followup Plan  04/27/1980    Annual Physical  04/27/1980    PT PLAN OF CARE  11/16/2019    HIV Screening  01/17/2020 (Originally 4/27/1975)    Hepatitis C Screening  12/13/2020 (Originally 1960)    MAMMOGRAM  05/16/2020    Depression Screening PHQ  10/17/2020    BMI: Adult  01/10/2021    CRC Screening: Colonoscopy  08/22/2023    Pneumococcal Vaccine: 65+ Years (1 of 2 - PCV13) 04/27/2027    Influenza Vaccine  Completed    Pneumococcal Vaccine: Pediatrics (0 to 5 Years) and At-Risk Patients (6 to 59 Years)  Aged Out    HIB Vaccine  Aged Out    Hepatitis B Vaccine  Aged Out    IPV Vaccine  Aged Out    Hepatitis A Vaccine  Aged Out    Meningococcal ACWY Vaccine  Aged Out    HPV Vaccine  Aged [employer] History   Administered Date(s) Administered    INFLUENZA 10/01/2016, 10/02/2017, 10/30/2019    Influenza Quadrivalent, 6-35 Months IM 10/02/2017    Influenza, Quadrivalent (nasal) 10/01/2016       Yamilet Weber DO  [facility]  BMI Counseling: Body mass index is 39 16 kg/m²  The BMI is above normal  Nutrition recommendations include 3-5 servings of fruits/vegetables daily  BMI Counseling: Body mass index is 39 16 kg/m²  The BMI is above normal  Nutrition recommendations include 3-5 servings of fruits/vegetables daily

## 2020-01-16 NOTE — ASSESSMENT & PLAN NOTE
Vitamin-D 47  Doing well on OTC vitamin-D 2000 International Units daily    Will continue to monitor Writer discussed below message with mom.  Mom stated she will stick with the appointment on 1/17/19 with Dr Koo.  Mom also stated she called to set up an EEG and was told to wait until she sees Dr Koo.  Writer discussed with mom to call back and tell them the EEG needs to be scheduled prior to her appointment with Dr Koo.   Mom verbalized understanding.

## 2020-01-16 NOTE — PATIENT INSTRUCTIONS
Obesity   AMBULATORY CARE:   Obesity  is when your body mass index (BMI) is greater than 30  Your healthcare provider will use your height and weight to measure your BMI  The risks of obesity include  many health problems, such as injuries or physical disability  You may need tests to check for the following:  · Diabetes     · High blood pressure or high cholesterol     · Heart disease     · Gallbladder or liver disease     · Cancer of the colon, breast, prostate, liver, or kidney     · Sleep apnea     · Arthritis or gout  Seek care immediately if:   · You have a severe headache, confusion, or difficulty speaking  · You have weakness on one side of your body  · You have chest pain, sweating, or shortness of breath  Contact your healthcare provider if:   · You have symptoms of gallbladder or liver disease, such as pain in your upper abdomen  · You have knee or hip pain and discomfort while walking  · You have symptoms of diabetes, such as intense hunger and thirst, and frequent urination  · You have symptoms of sleep apnea, such as snoring or daytime sleepiness  · You have questions or concerns about your condition or care  Treatment for obesity  focuses on helping you lose weight to improve your health  Even a small decrease in BMI can reduce the risk for many health problems  Your healthcare provider will help you set a weight-loss goal   · Lifestyle changes  are the first step in treating obesity  These include making healthy food choices and getting regular physical activity  Your healthcare provider may suggest a weight-loss program that involves coaching, education, and therapy  · Medicine  may help you lose weight when it is used with a healthy diet and physical activity  · Surgery  can help you lose weight if you are very obese and have other health problems  There are several types of weight-loss surgery  Ask your healthcare provider for more information    Be successful losing weight:   · Set small, realistic goals  An example of a small goal is to walk for 20 minutes 5 days a week  Anther goal is to lose 5% of your body weight  · Tell friends, family members, and coworkers about your goals  and ask for their support  Ask a friend to lose weight with you, or join a weight-loss support group  · Identify foods or triggers that may cause you to overeat , and find ways to avoid them  Remove tempting high-calorie foods from your home and workplace  Place a bowl of fresh fruit on your kitchen counter  If stress causes you to eat, then find other ways to cope with stress  · Keep a diary to track what you eat and drink  Also write down how many minutes of physical activity you do each day  Weigh yourself once a week and record it in your diary  Eating changes: You will need to eat 500 to 1,000 fewer calories each day than you currently eat to lose 1 to 2 pounds a week  The following changes will help you cut calories:  · Eat smaller portions  Use small plates, no larger than 9 inches in diameter  Fill your plate half full of fruits and vegetables  Measure your food using measuring cups until you know what a serving size looks like  · Eat 3 meals and 1 or 2 snacks each day  Plan your meals in advance  [place] and eat at home most of the time  Eat slowly  · Eat fruits and vegetables at every meal   They are low in calories and high in fiber, which makes you feel full  Do not add butter, margarine, or cream sauce to vegetables  Use herbs to season steamed vegetables  · Eat less fat and fewer fried foods  Eat more baked or grilled chicken and fish  These protein sources are lower in calories and fat than red meat  Limit fast food  Dress your salads with olive oil and vinegar instead of bottled dressing  · Limit the amount of sugar you eat  Do not drink sugary beverages  Limit alcohol  Activity changes:  Physical activity is good for your body in many ways   It helps you burn calories and build strong muscles  It decreases stress and depression, and improves your mood  It can also help you sleep better  Talk to your healthcare provider before you begin an exercise program   · Exercise for at least 30 minutes 5 days a week  Start slowly  Set aside time each day for physical activity that you enjoy and that is convenient for you  It is best to do both weight training and an activity that increases your heart rate, such as walking, bicycling, or swimming  · Find ways to be more active  Do yard work and housecleaning  Walk up the stairs instead of using elevators  Spend your leisure time going to events that require walking, such as outdoor festivals or fairs  This extra physical activity can help you lose weight and keep it off  Follow up with your healthcare provider as directed: You may need to meet with a dietitian  Write down your questions so you remember to ask them during your visits  © 2017 [employer] Information is for End User's use only and may not be sold, redistributed or otherwise used for commercial purposes  All illustrations and images included in CareNotes® are the copyrighted property of A D A M , Inc  or [employer]  The above information is an  only  It is not intended as medical advice for individual conditions or treatments  Talk to your doctor, nurse or pharmacist before following any medical regimen to see if it is safe and effective for you

## 2020-01-16 NOTE — ASSESSMENT & PLAN NOTE
Status post pacemaker insertion due to syncopal episode in 1998  Patient subsequently had generator replaced in 2007  Recently she became symptomatic again and required another generator replacement  She is feeling well currently with only mild soreness at her incision site    Continue follow-up with cardiologist as directed

## 2020-01-24 ENCOUNTER — IN-CLINIC DEVICE VISIT (OUTPATIENT)
Dept: CARDIOLOGY CLINIC | Facility: CLINIC | Age: 60
End: 2020-01-24

## 2020-01-24 DIAGNOSIS — Z95.0 CARDIAC PACEMAKER IN SITU: Primary | ICD-10-CM

## 2020-01-24 PROCEDURE — 99024 POSTOP FOLLOW-UP VISIT: CPT | Performed by: INTERNAL MEDICINE

## 2020-01-24 NOTE — PROGRESS NOTES
Results for orders placed or performed in visit on 01/24/20   Cardiac EP device report    Narrative    MDT DUAL CHAMBER PM/ NOT MRI CONDITIONAL  DEVICE INTERROGATED IN THE [facility] OFFICE  BATTERY VOLTAGE ADEQUATE (11 7 YRS)  AP-42%, >99% (>40% [email or web address])  ALL LEAD PARAMETERS WITHIN NORMAL LIMITS  NO SIGNIFICANT HIGH RATE EPISODES  88 RATE DROP RESPONSE EPISODES  PT DENIES LIGHTHEADEDNESS/DIZZINESS BUT C/O PALPITATIONS WHEN PACING DURING RATE DROP RESPONSE MODE; THESE ARE NEW SYMPTOMS SINCE GEN CHANGE  REPROGRAMMED RDR SETTINGS TO SAME AS PREVIOUS DEVICE  DECREASE MADE TO RA & RV AMPLITUDE TO PROMOTE DEVICE LONGEVITY WHILE MAINTAINING AN APPROPRIATE SAFETY MARGIN  NORMAL DEVICE FUNCTION  WOUND CHECK: INCISION CLEAN AND DRY WITH EDGES APPROXIMATED; WOUND CARE AND RESTRICTIONS REVIEWED WITH PATIENT   GV

## 2020-04-20 ENCOUNTER — REMOTE DEVICE CLINIC VISIT (OUTPATIENT)
Dept: CARDIOLOGY CLINIC | Facility: CLINIC | Age: 60
End: 2020-04-20
Payer: COMMERCIAL

## 2020-04-20 ENCOUNTER — TELEMEDICINE (OUTPATIENT)
Dept: CARDIOLOGY CLINIC | Facility: CLINIC | Age: 60
End: 2020-04-20
Payer: COMMERCIAL

## 2020-04-20 VITALS
HEART RATE: 61 BPM | DIASTOLIC BLOOD PRESSURE: 74 MMHG | BODY MASS INDEX: 39.24 KG/M2 | HEIGHT: 67 IN | WEIGHT: 250 LBS | SYSTOLIC BLOOD PRESSURE: 132 MMHG

## 2020-04-20 DIAGNOSIS — R00.1 SINUS BRADYCARDIA: ICD-10-CM

## 2020-04-20 DIAGNOSIS — Z95.0 CARDIAC PACEMAKER IN SITU: Primary | ICD-10-CM

## 2020-04-20 PROCEDURE — RECHECK: Performed by: INTERNAL MEDICINE

## 2020-04-20 PROCEDURE — 99214 OFFICE O/P EST MOD 30 MIN: CPT | Performed by: INTERNAL MEDICINE

## 2020-05-01 ENCOUNTER — TRANSCRIBE ORDERS (OUTPATIENT)
Dept: ADMINISTRATIVE | Facility: HOSPITAL | Age: 60
End: 2020-05-01

## 2020-05-01 DIAGNOSIS — Z12.31 SCREENING MAMMOGRAM FOR HIGH-RISK PATIENT: Primary | ICD-10-CM

## 2020-06-16 ENCOUNTER — HOSPITAL ENCOUNTER (OUTPATIENT)
Dept: MAMMOGRAPHY | Facility: MEDICAL CENTER | Age: 60
Discharge: HOME | End: 2020-06-16
Payer: COMMERCIAL

## 2020-06-16 VITALS — HEIGHT: 67 IN | BODY MASS INDEX: 39.24 KG/M2 | WEIGHT: 250 LBS

## 2020-06-16 DIAGNOSIS — Z12.31 SCREENING MAMMOGRAM FOR HIGH-RISK PATIENT: ICD-10-CM

## 2020-06-16 PROCEDURE — 77067 SCR MAMMO BI INCL CAD: CPT

## 2020-06-16 PROCEDURE — 77063 BREAST TOMOSYNTHESIS BI: CPT

## 2020-07-09 ENCOUNTER — APPOINTMENT (OUTPATIENT)
Dept: LAB | Facility: MEDICAL CENTER | Age: 60
End: 2020-07-09
Payer: COMMERCIAL

## 2020-07-09 DIAGNOSIS — Z13.0 SCREENING FOR DEFICIENCY ANEMIA: ICD-10-CM

## 2020-07-09 DIAGNOSIS — E78.2 MIXED HYPERLIPIDEMIA: ICD-10-CM

## 2020-07-09 DIAGNOSIS — E03.8 SUBCLINICAL HYPOTHYROIDISM: ICD-10-CM

## 2020-07-09 DIAGNOSIS — R73.09 ABNORMAL BLOOD SUGAR: ICD-10-CM

## 2020-07-09 LAB
ALBUMIN SERPL BCP-MCNC: 3.3 G/DL (ref 3.5–5)
ALP SERPL-CCNC: 93 U/L (ref 46–116)
ALT SERPL W P-5'-P-CCNC: 25 U/L (ref 12–78)
ANION GAP SERPL CALCULATED.3IONS-SCNC: 5 MMOL/L (ref 4–13)
AST SERPL W P-5'-P-CCNC: 11 U/L (ref 5–45)
BASOPHILS # BLD AUTO: 0.06 THOUSANDS/ΜL (ref 0–0.1)
BASOPHILS NFR BLD AUTO: 1 % (ref 0–1)
BILIRUB SERPL-MCNC: 0.31 MG/DL (ref 0.2–1)
BUN SERPL-MCNC: 15 MG/DL (ref 5–25)
CALCIUM SERPL-MCNC: 8.8 MG/DL (ref 8.3–10.1)
CHLORIDE SERPL-SCNC: 108 MMOL/L (ref 100–108)
CHOLEST SERPL-MCNC: 177 MG/DL (ref 50–200)
CO2 SERPL-SCNC: 27 MMOL/L (ref 21–32)
CREAT SERPL-MCNC: 1 MG/DL (ref 0.6–1.3)
EOSINOPHIL # BLD AUTO: 0.14 THOUSAND/ΜL (ref 0–0.61)
EOSINOPHIL NFR BLD AUTO: 2 % (ref 0–6)
ERYTHROCYTE [DISTWIDTH] IN BLOOD BY AUTOMATED COUNT: 13.6 % (ref 11.6–15.1)
EST. AVERAGE GLUCOSE BLD GHB EST-MCNC: 117 MG/DL
GFR SERPL CREATININE-BSD FRML MDRD: 62 ML/MIN/1.73SQ M
GLUCOSE P FAST SERPL-MCNC: 105 MG/DL (ref 65–99)
HBA1C MFR BLD: 5.7 %
HCT VFR BLD AUTO: 37.3 % (ref 34.8–46.1)
HDLC SERPL-MCNC: 46 MG/DL
HGB BLD-MCNC: 11.7 G/DL (ref 11.5–15.4)
IMM GRANULOCYTES # BLD AUTO: 0.01 THOUSAND/UL (ref 0–0.2)
IMM GRANULOCYTES NFR BLD AUTO: 0 % (ref 0–2)
LDLC SERPL CALC-MCNC: 106 MG/DL (ref 0–100)
LYMPHOCYTES # BLD AUTO: 2.06 THOUSANDS/ΜL (ref 0.6–4.47)
LYMPHOCYTES NFR BLD AUTO: 35 % (ref 14–44)
MCH RBC QN AUTO: 28.5 PG (ref 26.8–34.3)
MCHC RBC AUTO-ENTMCNC: 31.4 G/DL (ref 31.4–37.4)
MCV RBC AUTO: 91 FL (ref 82–98)
MONOCYTES # BLD AUTO: 0.44 THOUSAND/ΜL (ref 0.17–1.22)
MONOCYTES NFR BLD AUTO: 8 % (ref 4–12)
NEUTROPHILS # BLD AUTO: 3.12 THOUSANDS/ΜL (ref 1.85–7.62)
NEUTS SEG NFR BLD AUTO: 54 % (ref 43–75)
NRBC BLD AUTO-RTO: 0 /100 WBCS
PLATELET # BLD AUTO: 338 THOUSANDS/UL (ref 149–390)
PMV BLD AUTO: 10.2 FL (ref 8.9–12.7)
POTASSIUM SERPL-SCNC: 4.3 MMOL/L (ref 3.5–5.3)
PROT SERPL-MCNC: 7.3 G/DL (ref 6.4–8.2)
RBC # BLD AUTO: 4.11 MILLION/UL (ref 3.81–5.12)
SODIUM SERPL-SCNC: 140 MMOL/L (ref 136–145)
TRIGL SERPL-MCNC: 123 MG/DL
TSH SERPL DL<=0.05 MIU/L-ACNC: 2.3 UIU/ML (ref 0.36–3.74)
WBC # BLD AUTO: 5.83 THOUSAND/UL (ref 4.31–10.16)

## 2020-07-09 PROCEDURE — 85025 COMPLETE CBC W/AUTO DIFF WBC: CPT

## 2020-07-09 PROCEDURE — 83036 HEMOGLOBIN GLYCOSYLATED A1C: CPT

## 2020-07-09 PROCEDURE — 36415 COLL VENOUS BLD VENIPUNCTURE: CPT

## 2020-07-09 PROCEDURE — 80053 COMPREHEN METABOLIC PANEL: CPT

## 2020-07-09 PROCEDURE — 84443 ASSAY THYROID STIM HORMONE: CPT

## 2020-07-09 PROCEDURE — 80061 LIPID PANEL: CPT

## 2020-07-17 ENCOUNTER — OFFICE VISIT (OUTPATIENT)
Dept: FAMILY MEDICINE CLINIC | Facility: CLINIC | Age: 60
End: 2020-07-17
Payer: COMMERCIAL

## 2020-07-17 VITALS
DIASTOLIC BLOOD PRESSURE: 74 MMHG | HEART RATE: 81 BPM | RESPIRATION RATE: 17 BRPM | HEIGHT: 67 IN | BODY MASS INDEX: 40.97 KG/M2 | OXYGEN SATURATION: 98 % | WEIGHT: 261 LBS | TEMPERATURE: 98.9 F | SYSTOLIC BLOOD PRESSURE: 124 MMHG

## 2020-07-17 DIAGNOSIS — E55.9 VITAMIN D DEFICIENCY: ICD-10-CM

## 2020-07-17 DIAGNOSIS — E78.2 MIXED HYPERLIPIDEMIA: Primary | ICD-10-CM

## 2020-07-17 DIAGNOSIS — E03.8 SUBCLINICAL HYPOTHYROIDISM: ICD-10-CM

## 2020-07-17 DIAGNOSIS — Z23 NEED FOR VACCINATION: ICD-10-CM

## 2020-07-17 DIAGNOSIS — B02.9 HERPES ZOSTER WITHOUT COMPLICATION: ICD-10-CM

## 2020-07-17 DIAGNOSIS — R73.09 ABNORMAL BLOOD SUGAR: ICD-10-CM

## 2020-07-17 DIAGNOSIS — R00.1 SINUS BRADYCARDIA: ICD-10-CM

## 2020-07-17 PROCEDURE — 3008F BODY MASS INDEX DOCD: CPT | Performed by: FAMILY MEDICINE

## 2020-07-17 PROCEDURE — 1036F TOBACCO NON-USER: CPT | Performed by: FAMILY MEDICINE

## 2020-07-17 PROCEDURE — 3078F DIAST BP <80 MM HG: CPT | Performed by: FAMILY MEDICINE

## 2020-07-17 PROCEDURE — 99214 OFFICE O/P EST MOD 30 MIN: CPT | Performed by: FAMILY MEDICINE

## 2020-07-17 PROCEDURE — 90750 HZV VACC RECOMBINANT IM: CPT | Performed by: FAMILY MEDICINE

## 2020-07-17 PROCEDURE — 3074F SYST BP LT 130 MM HG: CPT | Performed by: FAMILY MEDICINE

## 2020-07-17 PROCEDURE — 90471 IMMUNIZATION ADMIN: CPT | Performed by: FAMILY MEDICINE

## 2020-07-17 NOTE — ASSESSMENT & PLAN NOTE
Status post infection with zoster November/December 2019 infection occurred on left flank region  Patient no longer taking gabapentin  She does complain of some mild intermittent pain in that region, otherwise much improved    Will give Shingrix vaccine today

## 2020-07-17 NOTE — PROGRESS NOTES
[facility]      NAME: Sheyla Reeder  AGE: 64 y o  SEX: female  : 1960   MRN: [id number]    DATE: 2020  TIME: 8:29 AM    Assessment and Plan     Problem List Items Addressed This Visit     Mixed hyperlipidemia - Primary      Cholesterol 177/106  Continue diet, exercise, weight loss         Sinus bradycardia    BMI 39 0-39 9,adult      BMI 40 97  Continue diet exercise  Will continue to monitor         Abnormal blood sugar      Blood sugar 105, A1c 5 7%  Continue reduced carb diet, exercise, weight loss  Will continue to check yearly         Subclinical hypothyroidism      TSH normal   Doing well on levothyroxine 50 mcg daily         Vitamin D deficiency      Continue OTC supplement         Herpes zoster without complication      Status post infection with zoster 2019 infection occurred on left flank region  Patient no longer taking gabapentin  She does complain of some mild intermittent pain in that region, otherwise much improved  Will give Shingrix vaccine today           Other Visit Diagnoses     Need for vaccination        Relevant Orders    Zoster Vaccine Recombinant IM           Shingrix #1 today    Return to office in:  6 months,  Yearly labs 2021    Chief Complaint     Chief Complaint   Patient presents with    Follow-up     6 months check up       History of Present Illness      Patient presents for recheck chronic medical problems today  Overall she is feeling well  Doing well on levothyroxine for hypothyroid  Doing well with pacemaker  Still sees cardiologist regularly  She has been trying to watch her diet and exercise  Still with mild pain from shingles infection last year  No longer taking gabapentin    Patient had labs drawn on       The following portions of the patient's history were reviewed and updated as appropriate: allergies, current medications, past family history, past medical history, past social history, past surgical history and problem list     Review of Systems   Review of Systems   Respiratory: Negative  Cardiovascular: Negative  Gastrointestinal: Negative  Genitourinary: Negative  Active Problem List     Patient Active Problem List   Diagnosis    Mixed hyperlipidemia    Sinus bradycardia    Left hand paresthesia    Cervical radiculopathy    BMI 39 0-39 9,adult    Abnormal blood sugar    Allergic rhinitis    Allergic reaction    Subclinical hypothyroidism    Pacemaker    Vitamin D deficiency    Hand pain, right    Herpes zoster without complication       Objective   /74 (BP Location: Left arm, Patient Position: Sitting, Cuff Size: Large)   Pulse 81   Temp 98 9 °F (37 2 °C) (Tympanic)   Resp 17   Ht 5' 6 93" (1 7 m)   Wt 118 kg (261 lb)   LMP  (LMP Unknown)   SpO2 98%   BMI 40 97 kg/m²     Physical Exam   Cardiovascular: Normal rate, regular rhythm, normal heart sounds and intact distal pulses  Carotids: no bruits  Ext: no edema   Pulmonary/Chest: Effort normal  No respiratory distress  She has no wheezes  She has no rales  Psychiatric: She has a normal mood and affect   Her behavior is normal  Thought content normal        Pertinent Laboratory/Diagnostic Studies:   labs from July 9th reviewed    Current Medications     Current Outpatient Medications:     aspirin 81 MG tablet, Take 81 mg by mouth daily, Disp: , Rfl:     azelastine (ASTELIN) 0 1 % nasal spray, 2 sprays into each nostril daily Use in each nostril as directed, Disp: , Rfl:     Cholecalciferol (VITAMIN D-3 PO), Take 4,000 Units by mouth daily, Disp: , Rfl:     EPINEPHrine (EPIPEN) 0 3 mg/0 3 mL SOAJ, Inject 0 3 mL (0 3 mg total) into a muscle as needed for anaphylaxis (twin pack), Disp: 2 each, Rfl: 3    FLUTICASONE PROPIONATE, NASAL, NA, 2 sprays into each nostril as needed  , Disp: , Rfl:     levothyroxine 50 mcg tablet, Take 1 tablet (50 mcg total) by mouth daily, Disp: 90 tablet, Rfl: 1   loratadine (CLARITIN) 10 mg tablet, Take 10 mg by mouth daily, Disp: , Rfl:     Omega-3 Fatty Acids (FISH OIL PO), Take 2,000 mg by mouth 2 (two) times a day, Disp: , Rfl:     Red Yeast Rice Extract (RED YEAST RICE PO), Take 1,200 mg by mouth 2 (two) times a day, Disp: , Rfl:     Health Maintenance     Health Maintenance   Topic Date Due    DTaP,Tdap,and Td Vaccines (1 - Tdap) 04/27/1973    HIV Screening  04/27/1977    PT PLAN OF CARE  11/16/2019    Annual Physical  04/30/2020    Influenza Vaccine  07/01/2020    Depression Screening PHQ  10/17/2020    BMI: Followup Plan  01/16/2021    Hepatitis C Screening  12/13/2020 (Originally 1960)    MAMMOGRAM  06/16/2021    BMI: Adult  07/17/2021    CRC Screening: Colonoscopy  08/22/2023    Pneumococcal Vaccine: 65+ Years (1 of 2 - PCV13) 04/27/2027    Pneumococcal Vaccine: Pediatrics (0 to 5 Years) and At-Risk Patients (6 to 59 Years)  Aged Out    HIB Vaccine  Aged Out    Hepatitis B Vaccine  Aged Out    IPV Vaccine  Aged Out    Hepatitis A Vaccine  Aged Out    Meningococcal ACWY Vaccine  Aged Out    HPV Vaccine  Aged [employer] History   Administered Date(s) Administered    INFLUENZA 10/01/2016, 10/02/2017, 10/30/2019    Influenza Quadrivalent, 6-35 Months IM 10/02/2017    Influenza, Quadrivalent (nasal) 10/01/2016       Chandrika Tsai DO  [facility]

## 2020-07-17 NOTE — ASSESSMENT & PLAN NOTE
Blood sugar 105, A1c 5 7%  Continue reduced carb diet, exercise, weight loss    Will continue to check yearly

## 2020-07-29 DIAGNOSIS — E03.8 SUBCLINICAL HYPOTHYROIDISM: ICD-10-CM

## 2020-07-29 RX ORDER — LEVOTHYROXINE SODIUM 0.05 MG/1
50 TABLET ORAL DAILY
Qty: 90 TABLET | Refills: 1 | Status: SHIPPED | OUTPATIENT
Start: 2020-07-29 | End: 2021-01-18 | Stop reason: SDUPTHER

## 2020-08-03 ENCOUNTER — REMOTE DEVICE CLINIC VISIT (OUTPATIENT)
Dept: CARDIOLOGY CLINIC | Facility: CLINIC | Age: 60
End: 2020-08-03
Payer: COMMERCIAL

## 2020-08-03 DIAGNOSIS — Z95.0 PACEMAKER: Primary | ICD-10-CM

## 2020-08-03 PROCEDURE — 93294 REM INTERROG EVL PM/LDLS PM: CPT | Performed by: INTERNAL MEDICINE

## 2020-08-03 PROCEDURE — 93296 REM INTERROG EVL PM/IDS: CPT | Performed by: INTERNAL MEDICINE

## 2020-08-03 NOTE — PROGRESS NOTES
MDT DUAL CHAMBER PM/ NOT MRI CONDITIONAL   CARELINK TRANSMISSION: BATTERY VOLTAGE ADEQUATE (7 6 YRS)  AP 14 3%  99 6% (>40%/ DDD 50 - /150 *NOTE - PREVIOUS DEVICE AVD WAS  PROGRAMMED 300/300 - CONSIDER REPROGRAMMING TO DECREASE RVP%)  ALL AVAILABLE LEAD PARAMETERS WITHIN NORMAL LIMITS  1 SVT-ST, 1 VT-NS NOTED W/BOTH SAME DAY, CONCURRENT TIME W/EGRM SHOWING PROB ST (ONSET NOT DOCUMENTED), DURATION ~ 2 MINS  W/ AVG V RATE 164-171 BPM  EF 55% (1/2020 ECHO)  125 RATE DROPE EPISODES (NO EGRMS STORED) - AVG 1 3/DAY  HX: SAME &  PT ON ASA ONLY, NO CARDIAC MEDS  NORMAL DEVICE FUNCTION      EB

## 2020-08-04 ENCOUNTER — IN-CLINIC DEVICE VISIT (OUTPATIENT)
Dept: CARDIOLOGY CLINIC | Facility: CLINIC | Age: 60
End: 2020-08-04

## 2020-08-04 DIAGNOSIS — Z95.0 PACEMAKER: Primary | ICD-10-CM

## 2020-08-04 PROCEDURE — RECHECK

## 2020-08-04 NOTE — PROGRESS NOTES
Results for orders placed or performed in visit on 08/04/20   Cardiac EP device report    Narrative    MDT DUAL CHAMBER PM/ NOT MRI CONDITIONAL  DEVICE INTERROGATED IN THE [place] OFFICE: N/B - REPROGRAMMING PER SS  BATTERY VOLTAGE ADEQUATE (8 3 YRS - 9 2 AFTER CHANGES)  AP 14 9%  99 6% (DDD 50 /150)  ALL AVAILABLE LEAD PARAMETERS WITHIN NORMAL LIMITS  RATE DROP EPISODES NOTED (HX OF SAME)  PACING MODE PROGRAMMED TO MVP (AAI-DDD 50) TO PROMOTE INTRINSIC CONDUCTION  PACEMAKER FUNCTIONING APPROPRIATELY              EB

## 2020-08-17 ENCOUNTER — ANNUAL EXAM (OUTPATIENT)
Dept: OBGYN CLINIC | Facility: CLINIC | Age: 60
End: 2020-08-17
Payer: COMMERCIAL

## 2020-08-17 VITALS
TEMPERATURE: 97.1 F | HEIGHT: 66 IN | SYSTOLIC BLOOD PRESSURE: 160 MMHG | DIASTOLIC BLOOD PRESSURE: 84 MMHG | BODY MASS INDEX: 42.13 KG/M2

## 2020-08-17 DIAGNOSIS — Z87.42 HISTORY OF ABNORMAL UTERINE BLEEDING: ICD-10-CM

## 2020-08-17 DIAGNOSIS — E66.01 MORBID OBESITY WITH BMI OF 40.0-44.9, ADULT (HCC): ICD-10-CM

## 2020-08-17 DIAGNOSIS — E03.9 HYPOTHYROIDISM (ACQUIRED): ICD-10-CM

## 2020-08-17 DIAGNOSIS — Z01.419 WOMEN'S ANNUAL ROUTINE GYNECOLOGICAL EXAMINATION: ICD-10-CM

## 2020-08-17 DIAGNOSIS — Z90.710 STATUS POST LAPAROSCOPIC ASSISTED VAGINAL HYSTERECTOMY (LAVH): ICD-10-CM

## 2020-08-17 DIAGNOSIS — Z12.31 ENCOUNTER FOR SCREENING MAMMOGRAM FOR BREAST CANCER: Primary | ICD-10-CM

## 2020-08-17 PROCEDURE — 3079F DIAST BP 80-89 MM HG: CPT | Performed by: OBSTETRICS & GYNECOLOGY

## 2020-08-17 PROCEDURE — 3077F SYST BP >= 140 MM HG: CPT | Performed by: OBSTETRICS & GYNECOLOGY

## 2020-08-17 PROCEDURE — 99396 PREV VISIT EST AGE 40-64: CPT | Performed by: OBSTETRICS & GYNECOLOGY

## 2020-08-17 NOTE — PROGRESS NOTES
Assessment   Annual well-woman exam  Status post LAVH history of pelvic pain abnormal uterine bleeding  Morbid obesity  Hypothyroidism  History of sinus bradycardia  History of pacemaker  Pap smear deferred        Plan   Screening mammogram ordered  Continue levothyroxine   All questions answered  Subjective here for annual exam     Pedro Lucio is a 64 y o  female who presents for annual exam  Periods are absent since her LAVH done many years ago for abnormal uterine bleeding symptoms  Patient denies pelvic pain symptoms unusual bleeding or unusual discharge  No other new gyn complaints or concerns  She recently had her mammogram which was within normal limits  The patient reports that there is not domestic violence in her life  Current contraception: status post hysterectomy  History of abnormal Pap smear: no  Family history of uterine or ovarian cancer: no  Regular self breast exam: yes  History of abnormal mammogram: no  Family history of breast cancer: no  History of abnormal lipids: no  Menstrual History:  OB History        5    Para   4    Term   3       1    AB   1    Living   3       SAB   1    TAB        Ectopic        Multiple        Live Births                    Menarche age: 17  No LMP recorded (lmp unknown)  Patient has had a hysterectomy  Review of Systems  Pertinent items are noted in HPI        Objective no acute distress     /84 (BP Location: Right arm, Patient Position: Sitting, Cuff Size: Large)   Temp (!) 97 1 °F (36 2 °C)   Ht 5' 6" (1 676 m)   LMP  (LMP Unknown)   BMI 42 13 kg/m²     General:   alert and oriented, in no acute distress, alert, appears stated age and cooperative   Heart: regular rate and rhythm, S1, S2 normal, no murmur, click, rub or gallop   Lungs: clear to auscultation bilaterally   Abdomen: soft, non-tender, without masses or organomegaly   Vulva: normal, Bartholin's, Urethra, [place]'s normal, female escutcheon   Vagina: normal mucosa, normal discharge   Cervix: surgically absent   Uterus: surgically absent   Adnexa: normal adnexa and no mass, fullness, tenderness   Bilateral breast exam in the sitting and supine position with chaperone present, no visible or palpable breast lesions identified  No breast masses noted  No supraclavicular or axillary lymphadenopathy noted  No nipple discharge  Reviewed self-breast exam techniques     Rectal exam, deferred

## 2020-10-19 ENCOUNTER — CLINICAL SUPPORT (OUTPATIENT)
Dept: FAMILY MEDICINE CLINIC | Facility: CLINIC | Age: 60
End: 2020-10-19
Payer: COMMERCIAL

## 2020-10-19 VITALS — TEMPERATURE: 97.5 F

## 2020-10-19 DIAGNOSIS — Z23 NEED FOR SHINGLES VACCINE: Primary | ICD-10-CM

## 2020-10-19 PROCEDURE — 90471 IMMUNIZATION ADMIN: CPT | Performed by: FAMILY MEDICINE

## 2020-10-19 PROCEDURE — 90750 HZV VACC RECOMBINANT IM: CPT | Performed by: FAMILY MEDICINE

## 2020-12-23 ENCOUNTER — IMMUNIZATIONS (OUTPATIENT)
Dept: FAMILY MEDICINE CLINIC | Facility: HOSPITAL | Age: 60
End: 2020-12-23
Payer: COMMERCIAL

## 2020-12-23 DIAGNOSIS — Z23 ENCOUNTER FOR IMMUNIZATION: ICD-10-CM

## 2020-12-23 PROCEDURE — 91300 SARS-COV-2 / COVID-19 MRNA VACCINE (PFIZER-BIONTECH) 30 MCG: CPT

## 2020-12-23 PROCEDURE — 0001A SARS-COV-2 / COVID-19 MRNA VACCINE (PFIZER-BIONTECH) 30 MCG: CPT

## 2021-01-11 ENCOUNTER — IMMUNIZATIONS (OUTPATIENT)
Dept: FAMILY MEDICINE CLINIC | Facility: HOSPITAL | Age: 61
End: 2021-01-11

## 2021-01-11 DIAGNOSIS — Z23 ENCOUNTER FOR IMMUNIZATION: ICD-10-CM

## 2021-01-11 PROCEDURE — 0002A SARS-COV-2 / COVID-19 MRNA VACCINE (PFIZER-BIONTECH) 30 MCG: CPT

## 2021-01-11 PROCEDURE — 91300 SARS-COV-2 / COVID-19 MRNA VACCINE (PFIZER-BIONTECH) 30 MCG: CPT

## 2021-01-18 ENCOUNTER — OFFICE VISIT (OUTPATIENT)
Dept: FAMILY MEDICINE CLINIC | Facility: CLINIC | Age: 61
End: 2021-01-18
Payer: COMMERCIAL

## 2021-01-18 VITALS
HEART RATE: 72 BPM | DIASTOLIC BLOOD PRESSURE: 80 MMHG | RESPIRATION RATE: 16 BRPM | WEIGHT: 270 LBS | HEIGHT: 66 IN | BODY MASS INDEX: 43.39 KG/M2 | OXYGEN SATURATION: 95 % | SYSTOLIC BLOOD PRESSURE: 130 MMHG | TEMPERATURE: 97 F

## 2021-01-18 DIAGNOSIS — E55.9 VITAMIN D DEFICIENCY: ICD-10-CM

## 2021-01-18 DIAGNOSIS — Z13.0 SCREENING FOR DEFICIENCY ANEMIA: ICD-10-CM

## 2021-01-18 DIAGNOSIS — R00.1 SINUS BRADYCARDIA: ICD-10-CM

## 2021-01-18 DIAGNOSIS — B02.9 HERPES ZOSTER WITHOUT COMPLICATION: ICD-10-CM

## 2021-01-18 DIAGNOSIS — T78.40XS ALLERGIC REACTION, SEQUELA: ICD-10-CM

## 2021-01-18 DIAGNOSIS — E78.2 MIXED HYPERLIPIDEMIA: ICD-10-CM

## 2021-01-18 DIAGNOSIS — E03.8 SUBCLINICAL HYPOTHYROIDISM: ICD-10-CM

## 2021-01-18 DIAGNOSIS — R73.09 ABNORMAL BLOOD SUGAR: Primary | ICD-10-CM

## 2021-01-18 PROCEDURE — 99214 OFFICE O/P EST MOD 30 MIN: CPT | Performed by: FAMILY MEDICINE

## 2021-01-18 RX ORDER — EPINEPHRINE 0.3 MG/.3ML
0.3 INJECTION SUBCUTANEOUS AS NEEDED
Qty: 2 EACH | Refills: 3 | Status: SHIPPED | OUTPATIENT
Start: 2021-01-18 | End: 2022-01-19 | Stop reason: SDUPTHER

## 2021-01-18 RX ORDER — LEVOTHYROXINE SODIUM 0.05 MG/1
50 TABLET ORAL DAILY
Qty: 90 TABLET | Refills: 1 | Status: SHIPPED | OUTPATIENT
Start: 2021-01-18 | End: 2021-07-12 | Stop reason: SDUPTHER

## 2021-01-18 NOTE — ASSESSMENT & PLAN NOTE
Status post infection  November 2019 left flank region  Patient still has occasional mild pain in this region    She completed Shingrix vaccine last year

## 2021-01-18 NOTE — ASSESSMENT & PLAN NOTE
Status post pacemaker insertion due to syncopal episode/episodes of bradycardia 1998  Had battery generator replaced in 2007 and again in 2020    Continue regular follow-up with Cardiology

## 2021-01-18 NOTE — PROGRESS NOTES
[facility]      NAME: Liza Zamora  AGE: 64 y o  SEX: female  : 1960   MRN: [id number]    DATE: 2021  TIME: 1:37 PM    Assessment and Plan     Problem List Items Addressed This Visit     Mixed hyperlipidemia      Continue diet exercise  Will continue to monitor         Relevant Orders    Comprehensive metabolic panel    Lipid Panel with Direct LDL reflex    Sinus bradycardia      Status post pacemaker insertion due to syncopal episode/episodes of bradycardia   Had battery generator replaced in  and again in   Continue regular follow-up with Cardiology         Relevant Medications    EPINEPHrine (EPIPEN) 0 3 mg/0 3 mL SOAJ    BMI 39 0-39 9,adult      Current BMI 43 91  Continue diet exercise  Will continue to monitor         Abnormal blood sugar - Primary       Last A1c 5 7%  Continue diet exercise  Will check yearly labs prior to next appointment         Relevant Orders    Comprehensive metabolic panel    Hemoglobin A1C    Allergic reaction      Refilled epinephrine pen today         Relevant Medications    EPINEPHrine (EPIPEN) 0 3 mg/0 3 mL SOAJ    Subclinical hypothyroidism      Doing well on levothyroxine 50         Relevant Medications    levothyroxine 50 mcg tablet    Other Relevant Orders    TSH, 3rd generation with Free T4 reflex    Vitamin D deficiency      Continue OTC vitamin-D         Herpes zoster without complication      Status post infection  2019 left flank region  Patient still has occasional mild pain in this region    She completed Shingrix vaccine last year           Other Visit Diagnoses     Screening for deficiency anemia        Relevant Orders    CBC and differential         patient had COVID vaccine      Return to office in: 6 mos, yearly labs prior    Chief Complaint     Chief Complaint   Patient presents with    Follow-up     6 months       History of Present Illness       Patient presents for recheck chronic medical problems today   She still has occasional left flank pain, otherwise is doing well  Doing well on levothyroxine for hypothyroid  Still taking OTC vitamin-D  Still sees cardiologist regularly for pacemaker checks  The following portions of the patient's history were reviewed and updated as appropriate: allergies, current medications, past family history, past medical history, past social history, past surgical history and problem list     Review of Systems   Review of Systems   Respiratory: Negative  Cardiovascular: Negative  Gastrointestinal: Negative  Genitourinary: Negative  Active Problem List     Patient Active Problem List   Diagnosis    Mixed hyperlipidemia    Sinus bradycardia    Left hand paresthesia    Cervical radiculopathy    BMI 39 0-39 9,adult    Abnormal blood sugar    Allergic rhinitis    Allergic reaction    Subclinical hypothyroidism    Pacemaker    Vitamin D deficiency    Hand pain, right    Herpes zoster without complication    Status post abdominal hysterectomy       Objective   /80 (BP Location: Left arm, Patient Position: Sitting, Cuff Size: Large)   Pulse 72   Temp (!) 97 °F (36 1 °C) (Tympanic)   Resp 16   Ht 5' 5 75" (1 67 m)   Wt 122 kg (270 lb)   LMP  (LMP Unknown)   SpO2 95%   BMI 43 91 kg/m²     Physical Exam  Cardiovascular:      Rate and Rhythm: Normal rate and regular rhythm  Heart sounds: Normal heart sounds  Comments: Carotids: no bruits  Ext: no edema  Pulmonary:      Effort: Pulmonary effort is normal  No respiratory distress  Breath sounds: No wheezing or rales  Psychiatric:         Behavior: Behavior normal          Thought Content:  Thought content normal          Pertinent Laboratory/Diagnostic Studies:  labs 7/2020    Current Medications     Current Outpatient Medications:     aspirin 81 MG tablet, Take 81 mg by mouth daily, Disp: , Rfl:     azelastine (ASTELIN) 0 1 % nasal spray, 2 sprays into each nostril daily Use in each nostril as directed, Disp: , Rfl:     Cholecalciferol (VITAMIN D-3 PO), Take 4,000 Units by mouth daily, Disp: , Rfl:     EPINEPHrine (EPIPEN) 0 3 mg/0 3 mL SOAJ, Inject 0 3 mL (0 3 mg total) into a muscle as needed for anaphylaxis (twin pack), Disp: 2 each, Rfl: 3    FLUTICASONE PROPIONATE, NASAL, NA, 2 sprays into each nostril as needed  , Disp: , Rfl:     levothyroxine 50 mcg tablet, Take 1 tablet (50 mcg total) by mouth daily, Disp: 90 tablet, Rfl: 1    loratadine (CLARITIN) 10 mg tablet, Take 10 mg by mouth daily, Disp: , Rfl:     Omega-3 Fatty Acids (FISH OIL PO), Take 2,000 mg by mouth 2 (two) times a day, Disp: , Rfl:     Red Yeast Rice Extract (RED YEAST RICE PO), Take 1,200 mg by mouth 2 (two) times a day, Disp: , Rfl:     Health Maintenance     Health Maintenance   Topic Date Due    Hepatitis C Screening  1960    HIV Screening  04/27/1977    DTaP,Tdap,and Td Vaccines (1 - Tdap) 04/27/1983    PT PLAN OF CARE  11/16/2019    BMI: Followup Plan  01/16/2021    MAMMOGRAM  06/16/2021    Annual Physical  08/17/2021    Depression Screening PHQ  01/18/2022    BMI: Adult  01/18/2022    Colorectal Cancer Screening  08/22/2023    Influenza Vaccine  Completed    COVID-19 Vaccine  Completed    Pneumococcal Vaccine: Pediatrics (0 to 5 Years) and At-Risk Patients (6 to 59 Years)  Aged Out    HIB Vaccine  Aged Out    Hepatitis B Vaccine  Aged Out    IPV Vaccine  Aged Out    Hepatitis A Vaccine  Aged Out    Meningococcal ACWY Vaccine  Aged Out    HPV Vaccine  Aged [employer] History   Administered Date(s) Administered    INFLUENZA 10/01/2016, 10/02/2017, 10/30/2019, 10/20/2020    Influenza Quadrivalent, 6-35 Months IM 10/02/2017    Influenza, Quadrivalent (nasal) 10/01/2016    SARS-CoV-2 / COVID-19 mRNA IM ([employer]) 12/23/2020, 01/11/2021    Zoster Vaccine Recombinant 07/17/2020, 10/19/2020       Echo Reese DO  [facility]

## 2021-01-26 ENCOUNTER — IN-CLINIC DEVICE VISIT (OUTPATIENT)
Dept: CARDIOLOGY CLINIC | Facility: CLINIC | Age: 61
End: 2021-01-26
Payer: COMMERCIAL

## 2021-01-26 DIAGNOSIS — Z95.0 PACEMAKER: Primary | ICD-10-CM

## 2021-01-26 PROCEDURE — 93280 PM DEVICE PROGR EVAL DUAL: CPT | Performed by: INTERNAL MEDICINE

## 2021-01-26 NOTE — PROGRESS NOTES
Results for orders placed or performed in visit on 01/26/21   Cardiac EP device report    Narrative    MDT-DUAL CHAMBER PPM (AAI-DDD MODE) / NOT MRI CONDITIONAL  DEVICE INTERROGATED IN THE [place] OFFICE/YEARLY  BATTERY ADEQUATE (5-9 1 YRS)  AP 18%;  1%  ALL LEAD PARAMETERS WITHIN NORMAL LIMITS (RV THRESHOLD ELEVATED BUT STABLE)  AF 0%  1 NEW DEVICE CLASSIFIED NSVT EPISODE WITH AVAILABLE E-GRAM SHOWING SVT (UP  BPM; NO TERMINATION AVAILABLE)  PT  TAKES ASA 81  NO PROGRAMMING CHANGES MADE TO DEVICE PARAMETERS  NORMAL DEVICE FUNCTION   PL

## 2021-05-11 ENCOUNTER — REMOTE DEVICE CLINIC VISIT (OUTPATIENT)
Dept: CARDIOLOGY CLINIC | Facility: CLINIC | Age: 61
End: 2021-05-11
Payer: COMMERCIAL

## 2021-05-11 ENCOUNTER — OFFICE VISIT (OUTPATIENT)
Dept: CARDIOLOGY CLINIC | Facility: CLINIC | Age: 61
End: 2021-05-11
Payer: COMMERCIAL

## 2021-05-11 VITALS
BODY MASS INDEX: 43.15 KG/M2 | DIASTOLIC BLOOD PRESSURE: 82 MMHG | WEIGHT: 259 LBS | SYSTOLIC BLOOD PRESSURE: 142 MMHG | HEIGHT: 65 IN | HEART RATE: 56 BPM

## 2021-05-11 DIAGNOSIS — Z95.0 PRESENCE OF PERMANENT CARDIAC PACEMAKER: Primary | ICD-10-CM

## 2021-05-11 DIAGNOSIS — R00.1 SINUS BRADYCARDIA: Primary | ICD-10-CM

## 2021-05-11 PROCEDURE — 93000 ELECTROCARDIOGRAM COMPLETE: CPT | Performed by: INTERNAL MEDICINE

## 2021-05-11 PROCEDURE — 93294 REM INTERROG EVL PM/LDLS PM: CPT | Performed by: INTERNAL MEDICINE

## 2021-05-11 PROCEDURE — 93296 REM INTERROG EVL PM/IDS: CPT | Performed by: INTERNAL MEDICINE

## 2021-05-11 PROCEDURE — 99215 OFFICE O/P EST HI 40 MIN: CPT | Performed by: INTERNAL MEDICINE

## 2021-05-11 NOTE — PATIENT INSTRUCTIONS
If you have another episode of palpitations    Then try following maneuvers    Take a deep breath and blow it into brown bag    Lay down and raise your legs  Or    Bear down  If episodes last for several hours, then go to ER

## 2021-05-11 NOTE — PROGRESS NOTES
Results for orders placed or performed in visit on 05/11/21   Cardiac EP device report    Narrative    MDT-DUAL CHAMBER PPM (AAI-DDD MODE) / NOT MRI CONDITIONAL  CARELINK TRANSMISSION: BATTERY VOLTAGE ADEQUATE  (9 4 YRS) AP 30%  1%  ALL AVAILABLE LEAD PARAMETERS WITHIN NORMAL LIMITS  4 FAST A & V EPISODES DETECTED 1 AT/AF EPISODE DETECTED, ATRIAL LEAD NOISE NOTED ALL ON SAME DAY  NOTHING SINCE  PATIENT IS ONLY ON ASA  NORMAL DEVICE FUNCTION  ---OBANDO

## 2021-05-11 NOTE — PROGRESS NOTES
Cardiology Follow Up    Ron Burroughs  1960  [id number]  [facility] [place]  [address] [id number] [phone]    HPI   It was a pleasure to see Ron Burroughs in our arrhythmia clinic at [place] [address] on 1/9/2020  She is a 64 y o  woman with sinus bradycardia s/p PPM, arthritis, morbid obesity, HTN  Presented to discuss management of her dyspnea  Patient reported experiencing epigastric pain on christmas morning  Her symptoms worsened and she started having orthostatic episodes with shortness of breath  She stated that back in 2007 she experienced the same situation when her pacemaker had reached CHAS  However these symptoms were worse  She also felt dyspnea to walking 1/2 block and had noticed weight gain  She felt coughing every few minutes but did not have any phlegm  She denied LE edema or syncope  She had pacemaker placed originally after having syncopal episodes due to bradycardia  She was noted to have pacemaker syndrome as the mode on her dual chamber device was switched to VVI 65 bpm      She had generator change on 1/10/2020  She was seen afterward through a telemedicine visit  She reported doing well and denied symptoms  She now presents for a follow-up visit  She has felt palpitations at times but does not recall date and time  Otherwise denies any chest pain, shortness of breath, orthopnea, PND or syncope       Patient Active Problem List   Diagnosis    Mixed hyperlipidemia    Sinus bradycardia    Left hand paresthesia    Cervical radiculopathy    BMI 39 0-39 9,adult    Abnormal blood sugar    Allergic rhinitis    Allergic reaction    Subclinical hypothyroidism    Pacemaker    Vitamin D deficiency    Hand pain, right    Herpes zoster without complication    Status post abdominal hysterectomy     Past Medical History:   Diagnosis Date    Abnormal blood chemistry     last assessed: 6/9/2014    Allergic reaction     last assessed: 9/17/2012    Allergic rhinitis     last assessed: 6/9/2014    Arthralgia of left shoulder region     last assessed: 12/3/2013    Arthritis     Dysautonomia ([address] )     Elevated blood pressure reading without diagnosis of hypertension     last assessed: 9/30/2016    Endometriosis     Familial combined hyperlipidemia     last assessed: 6/9/2014    Female infertility     Fibroid     Functional murmur     description: soft systolic murmur - no valvular disease on 2D echo Last assessed: 4/23/2014    Hypertension     Obstruction of eustachian tube     unspecified laterality Last assessed: 3/17/2014    Polycystic ovary syndrome     PONV (postoperative nausea and vomiting)     Subclinical hypothyroidism 1/6/2019    Symptomatic bradycardia     Syncope     Wears glasses      Social History     Socioeconomic History    Marital status: /Civil Union     Spouse name: Not on file    Number of children: 3    Years of education: Not on file    Highest education level: Not on file   Occupational History    Not on file   Social Needs    Financial resource strain: Not on file    Food insecurity     Worry: Not on file     Inability: Not on file   [employer] needs     Medical: Not on file     Non-medical: Not on file   Tobacco Use    Smoking status: Never Smoker    Smokeless tobacco: Never Used   Substance and Sexual Activity    Alcohol use: Yes     Comment: rare    Drug use: No    Sexual activity: Yes     Birth control/protection: Female Sterilization   Lifestyle    Physical activity     Days per week: Not on file     Minutes per session: Not on file    Stress: Not on file   Relationships    Social connections     Talks on phone: Not on file     Gets together: Not on file     Attends [employer] service: Not on file     Active member of club or organization: Not on file     Attends meetings of clubs or organizations: Not on file     Relationship status: Not on file    Intimate partner violence     Fear of current or ex partner: Not on file     Emotionally abused: Not on file     Physically abused: Not on file     Forced sexual activity: Not on file   Other Topics Concern    Not on file   Social History Narrative    Living independently with spouse    Caffeine use       Family History   Problem Relation Age of Onset    Breast cancer Mother 76    Ovarian cancer Mother 80    Multiple myeloma Mother     Cancer Father         liver    Allergies Family         bronchitis    Diabetes Family     Seizures Family     Heart disease Family     Hypertension Family     Skin cancer Family     No Known Problems Sister     No Known Problems Daughter     Colon cancer Maternal Grandmother     Cancer Maternal Grandfather         thyroid    No Known Problems Sister     No Known Problems Sister     No Known Problems Daughter     No Known Problems Maternal Aunt     No Known Problems Paternal Aunt     Cancer Paternal Aunt         liver     Past Surgical History:   Procedure Laterality Date    APPENDECTOMY      CARDIAC CATHETERIZATION      Description: Normal EF, No obstructive coronary artery disease, systolic hypertension  [place]end done at [facility] Resolved: 1998    CARDIAC PACEMAKER PLACEMENT Left      SECTION      COLONOSCOPY      DEBRIDEMENT TENNIS ELBOW      GANGLION CYST EXCISION Left     hand    HYSTERECTOMY      KNEE ARTHROSCOPY Right     [place] SHLDR ARTHROSCOP,SURG,W/ROTAT CUFF REPR Left 10/3/2016    Procedure: ARTHROSCOPY SHOULDER, ROTATOR CUFF REPAIR,SUBACROMIAL DECOMPRESSION ; MICROFRACTURE MIKAELA GLENOID HEAD;  Surgeon: Ketan Pettit MD;  Location: [facility];  Service: Orthopedics    TONSILLECTOMY      WISDOM TOOTH EXTRACTION         Current Outpatient Medications:     aspirin 81 MG tablet, Take 81 mg by mouth daily, Disp: , Rfl:     azelastine (ASTELIN) 0 1 % nasal spray, 2 sprays into each nostril daily Use in each nostril as directed, Disp: , Rfl:     Cholecalciferol (VITAMIN D-3 PO), Take 4,000 Units by mouth daily, Disp: , Rfl:     EPINEPHrine (EPIPEN) 0 3 mg/0 3 mL SOAJ, Inject 0 3 mL (0 3 mg total) into a muscle as needed for anaphylaxis (twin pack), Disp: 2 each, Rfl: 3    FLUTICASONE PROPIONATE, NASAL, NA, 2 sprays into each nostril as needed  , Disp: , Rfl:     levothyroxine 50 mcg tablet, Take 1 tablet (50 mcg total) by mouth daily, Disp: 90 tablet, Rfl: 1    loratadine (CLARITIN) 10 mg tablet, Take 10 mg by mouth daily, Disp: , Rfl:     Omega-3 Fatty Acids (FISH OIL PO), Take 2,000 mg by mouth 2 (two) times a day, Disp: , Rfl:     Red Yeast Rice Extract (RED YEAST RICE PO), Take 1,200 mg by mouth 2 (two) times a day, Disp: , Rfl:   Allergies   Allergen Reactions    Shellfish Allergy - Food Allergy Anaphylaxis    Shellfish-Derived Products - Food Allergy Anaphylaxis    Shrimp Flavor - Food Allergy Anaphylaxis    Darvon [Propoxyphene] Hives    Tramadol Hives    Ultram [Tramadol Hcl] Hives       Review of Systems:  Review of Systems   Constitutional: Negative for chills, fatigue and fever  Eyes: Negative for discharge and visual disturbance  Respiratory: Negative  Negative for shortness of breath and wheezing  Cardiovascular: Positive for palpitations  Negative for chest pain and leg swelling  Gastrointestinal: Negative for abdominal pain, nausea and vomiting  Endocrine: Negative  Genitourinary: Negative  Musculoskeletal: Negative  Negative for arthralgias  Skin: Negative  Negative for rash  Allergic/Immunologic: Negative  Neurological: Negative for dizziness and light-headedness  Psychiatric/Behavioral: Negative  The patient is not nervous/anxious          Physical Exam:  Objective:   /82 (BP Location: Left arm, Patient Position: Sitting, Cuff Size: Standard)   Pulse 56   Ht 5' 5" (1 651 m)   Wt 117 kg (259 lb)   LMP  (LMP Unknown) BMI 43 10 kg/m²    Physical Exam:  GEN: NAD, Alert and oriented, well appearing; morbidly obese  HEENT:Head, neck, ears, oral pharynx: Mucus membranes moist, oral pharynx clear, nares clear  External ears normal  EYES: Pupils equal, sclera anicteric  NECK: No JVD  CARDIOVASCULAR: RRR, No murmur, rub, gallops S1,S2  LUNGS: Clear To auscultation bilaterally  ABDOMEN: Soft, nondistended  EXTREMITIES/VASCULAR: No edema  PSYCH: Normal Affect  NEURO: Grossly intact, moving all extremiteis equal, face symetric  HEME: No bleeding, bruising, petechia  SKIN: No significant rashes       Labs & Results:  Below is the patient's most recent value for Albumin, ALT, AST, BUN, Calcium, Chloride, Cholesterol, CO2, Creatinine, GFR, Glucose, HDL, Hematocrit, Hemoglobin, Hemoglobin A1C, LDL, Magnesium, Phosphorus, Platelets, Potassium, PSA, Sodium, Triglycerides, and WBC  Lab Results   Component Value Date    ALT 25 07/09/2020    AST 11 07/09/2020    BUN 15 07/09/2020    CALCIUM 8 8 07/09/2020     07/09/2020    CHOL 204 12/17/2015    CO2 27 07/09/2020    CREATININE 1 00 07/09/2020    HDL 46 07/09/2020    HCT 37 3 07/09/2020    HGB 11 7 07/09/2020    HGBA1C 5 7 (H) 07/09/2020     07/09/2020    K 4 3 07/09/2020     12/17/2015    TRIG 123 07/09/2020    WBC 5 83 07/09/2020     Note: for a comprehensive list of the patient's lab results, access the Results Review activity  Cardiac testing:     I personally reviewed the ECG performed in the clinic on 05/11/21  It reveals sinus bradycardia at 56 bpm and 1st degree AV delay at 224 ms       Echocardiograms:  Results for orders placed during the hospital encounter of 01/10/20   Echo complete with contrast if indicated    Narrative [address]  [facility] [address] [facility]  [phone]    Transthoracic Echocardiogram  2D, M-mode, Doppler, and Color Doppler    Study date:  10-James-2020    Patient: Preeti Cisneros  MR number: [id number]  Account number: [de-identified]  : 1960  Age: 64 years  Gender: Female  Status: Emergency  Location: Bedside  Height: 66 in  Weight: 251 lb  BP: 118/ 84 mmHg    Indications: Atrial fibrillation  Diagnoses: I48 0 - Atrial fibrillation    Sonographer:  Aurelia Jesus RDCS  Primary Physician:  Luis Alfredo Zaldivar DO  Referring Physician:  Ana Bo PA-C  Group:  [id number]  Cardiology Fellow: Inessa Canales MD  Interpreting Physician:  Rafaela Gallegos MD    SUMMARY    LEFT VENTRICLE:  Size was normal   Systolic function was normal  Ejection fraction was estimated to be 55 %  Wall thickness was mildly increased  Features were consistent with a pseudonormal left ventricular filling pattern, with concomitant abnormal relaxation and increased filling pressure (grade 2 diastolic dysfunction)  VENTRICULAR SEPTUM:  There was dyssynergic motion  These changes are consistent with right ventricular pacing  RIGHT VENTRICLE:  The size was normal   Systolic function was normal     TRICUSPID VALVE:  There was trace regurgitation  HISTORY: PRIOR HISTORY: pacemaker    PROCEDURE: The procedure was performed at the bedside  This was a routine study  The transthoracic approach was used  The study included complete 2D imaging, M-mode, complete spectral Doppler, and color Doppler  The heart rate was 65 bpm,  at the start of the study  Images were obtained from the parasternal, apical, subcostal, and suprasternal notch acoustic windows  Image quality was adequate  LEFT VENTRICLE: Size was normal  Systolic function was normal  Ejection fraction was estimated to be 55 %  There were no regional wall motion abnormalities  Wall thickness was mildly increased  There was mild concentric hypertrophy  The  changes were consistent with concentric remodeling (increased wall thickness with normal wall mass)   DOPPLER: There was a reduced contribution of atrial contraction to ventricular filling, due to increased ventricular diastolic pressure or  atrial contractile dysfunction  Features were consistent with a pseudonormal left ventricular filling pattern, with concomitant abnormal relaxation and increased filling pressure (grade 2 diastolic dysfunction)  VENTRICULAR SEPTUM: There was dyssynergic motion  These changes are consistent with right ventricular pacing  RIGHT VENTRICLE: The size was normal  Systolic function was normal  A pacing wire was present  LEFT ATRIUM: Size was at the upper limits of normal     RIGHT ATRIUM: Size was normal  A pacing wire was present  MITRAL VALVE: Valve structure was normal  There was normal leaflet separation  DOPPLER: The transmitral velocity was within the normal range  There was no evidence for stenosis  There was no significant regurgitation  AORTIC VALVE: The valve was trileaflet  Leaflets exhibited normal thickness and normal cuspal separation  DOPPLER: Transaortic velocity was minimally increased  There was no evidence for stenosis  There was no regurgitation  TRICUSPID VALVE: The valve structure was normal  There was normal leaflet separation  DOPPLER: The transtricuspid velocity was within the normal range  There was no evidence for stenosis  There was trace regurgitation  The tricuspid jet  envelope definition was inadequate for estimation of RV systolic pressure  PULMONIC VALVE: Leaflets exhibited normal thickness, no calcification, and normal cuspal separation  DOPPLER: The transpulmonic velocity was within the normal range  There was no evidence for stenosis  There was no significant  regurgitation  PERICARDIUM: There was no pericardial effusion  The pericardium was normal in appearance  AORTA: The root exhibited normal size  SYSTEMIC VEINS: HEPATIC VEINS: The hepatic veins were not well visualized      MEASUREMENT TABLES    2D MEASUREMENTS  LVOT   (Reference normals)  Diam   21 mm   (--)    DOPPLER MEASUREMENTS  LVOT (Reference normals)  Peak anjel   136 cm/s   (--)  Mean anjel   82 cm/s   (--)  VTI   29 4 cm   (--)  Peak gradient   7 mmHg   (--)  Mean gradient   3 4 mmHg   (--)  Stroke vol   101 83 ml   (--)  Stroke index   0 61 ml/mï¾²   (--)  Aortic valve   (Reference normals)  Peak anjel   196 cm/s   (--)  Mean anjel   136 cm/s   (--)  VTI   41 3 cm   (--)  Peak gradient   15 3 mmHg   (--)  Mean gradient   8 2 mmHg   (--)  Obstr index, VTI   0 71    (--)  Valve area, VTI   2 46 cmï¾²   (--)  Area index, VTI   1 12 cmï¾²/mï¾²   (--)  Obstr index, Vmax   0 69    (--)  Valve area, Vmax   2 39 cmï¾²   (--)  Area index, Vmax   1 09 cmï¾²/mï¾²   (--)  Obstr index, Vmean   0 6    (--)  Valve area, Vmean   2 08 cmï¾²   (--)  Area index, Vmean   0 95 cmï¾²/mï¾²   (--)    SYSTEM MEASUREMENT TABLES    2D  %FS: 36 3 %  Ao Diam: 2 88 cm  EDV(Teich): 56 76 ml  EF(Teich): 66 89 %  ESV(Teich): 18 79 ml  IVSd: 1 08 cm  LA Area: 18 04 cm2  LA Diam: 3 17 cm  LVEDV MOD A4C: 81 37 ml  LVEF MOD A4C: 53 88 %  LVESV MOD A4C: 37 53 ml  LVIDd: 3 66 cm  LVIDs: 2 33 cm  LVLd A4C: 8 48 cm  LVLs A4C: 7 4 cm  LVOT Diam: 2 12 cm  LVPWd: 1 03 cm  RA Area: 14 58 cm2  RVIDd: 3 11 cm  SV MOD A4C: 43 85 ml  SV(Teich): 37 97 ml    CW  AV Env  Ti: 304 5 ms  AV VTI: 41 27 cm  AV Vmax: 1 96 m/s  AV Vmean: 1 36 m/s  AV maxPG: 15 32 mmHg  AV meanP 2 mmHg    MM  TAPSE: 2 34 cm    PW  JACKIE (VTI): 2 5 cm2  JACKIE Vmax: 2 44 cm2  LVOT Env  Ti: 359 86 ms  LVOT VTI: 29 37 cm  LVOT Vmax: 1 36 m/s  LVOT Vmean: 0 82 m/s  LVOT maxP 37 mmHg  LVOT meanPG: 3 38 mmHg  LVSV Dopp: 103 23 ml  MV A Anjel: 0 43 m/s  MV Dec [place]: 5 59 m/s2  MV DecT: 180 1 ms  MV E Anjel: 1 01 m/s  MV E/A Ratio: 2 35  MV PHT: 52 23 ms  MVA By PHT: 4 21 cm2    [address] Accredited [facility]    Prepared and electronically signed by    Javon Brown MD  Signed 10-James-2020 15:57:32       No results found for this or any previous visit      Catheterizations:   No results found for this or any previous visit  Stress Tests:  No results found for this or any previous visit  Holter monitor -   No results found for this or any previous visit  No results found for this or any previous visit  Discussion/Summary:         ASSESSMENT/PLAN:  #Syncope, sinus bradycardia s/p PPM  - Pacemaker syndrome due to device reaching CHAS, and mode at VVI 65 bpm  - S/p gen change on 1/10/2020  - Symptoms resolved post generator change   - Device interrogation showed stable lead parameters  - RV threshold noted to be slightly higher but stable  Minimally ;  - Noise noted on the device leads only on one day; possibly external noise       #Arthritis  - stable    #Morbidity obesity  - attempting to loose weight     #HTN   - Normotensive at home   - Continue diet and exercise control     #Atrial arrhythmia  - AT noted on device interrogation on 4/6/2020  - Termination of the episode not available so unclear how long it lasted  - However she was asymptomatic  - Continues to have SVT episodes  - Discussed medications vs  Ablation vs  Monitoring  - Patient prefers to monitor episodes for now  - Will continue with remote checks  - Valsalva maneuvers discussed  - Follow-up as needed

## 2021-07-08 ENCOUNTER — APPOINTMENT (OUTPATIENT)
Dept: LAB | Facility: MEDICAL CENTER | Age: 61
End: 2021-07-08
Payer: COMMERCIAL

## 2021-07-08 DIAGNOSIS — Z13.0 SCREENING FOR DEFICIENCY ANEMIA: ICD-10-CM

## 2021-07-08 DIAGNOSIS — E78.2 MIXED HYPERLIPIDEMIA: ICD-10-CM

## 2021-07-08 DIAGNOSIS — E03.8 SUBCLINICAL HYPOTHYROIDISM: ICD-10-CM

## 2021-07-08 DIAGNOSIS — R73.09 ABNORMAL BLOOD SUGAR: ICD-10-CM

## 2021-07-08 LAB
ALBUMIN SERPL BCP-MCNC: 3.3 G/DL (ref 3.5–5)
ALP SERPL-CCNC: 90 U/L (ref 46–116)
ALT SERPL W P-5'-P-CCNC: 19 U/L (ref 12–78)
ANION GAP SERPL CALCULATED.3IONS-SCNC: 3 MMOL/L (ref 4–13)
AST SERPL W P-5'-P-CCNC: 15 U/L (ref 5–45)
BASOPHILS # BLD AUTO: 0.06 THOUSANDS/ΜL (ref 0–0.1)
BASOPHILS NFR BLD AUTO: 1 % (ref 0–1)
BILIRUB SERPL-MCNC: 0.64 MG/DL (ref 0.2–1)
BUN SERPL-MCNC: 21 MG/DL (ref 5–25)
CALCIUM ALBUM COR SERPL-MCNC: 9.8 MG/DL (ref 8.3–10.1)
CALCIUM SERPL-MCNC: 9.2 MG/DL (ref 8.3–10.1)
CHLORIDE SERPL-SCNC: 107 MMOL/L (ref 100–108)
CHOLEST SERPL-MCNC: 182 MG/DL (ref 50–200)
CO2 SERPL-SCNC: 27 MMOL/L (ref 21–32)
CREAT SERPL-MCNC: 1.04 MG/DL (ref 0.6–1.3)
EOSINOPHIL # BLD AUTO: 0.19 THOUSAND/ΜL (ref 0–0.61)
EOSINOPHIL NFR BLD AUTO: 3 % (ref 0–6)
ERYTHROCYTE [DISTWIDTH] IN BLOOD BY AUTOMATED COUNT: 13.1 % (ref 11.6–15.1)
EST. AVERAGE GLUCOSE BLD GHB EST-MCNC: 114 MG/DL
GFR SERPL CREATININE-BSD FRML MDRD: 59 ML/MIN/1.73SQ M
GLUCOSE P FAST SERPL-MCNC: 99 MG/DL (ref 65–99)
HBA1C MFR BLD: 5.6 %
HCT VFR BLD AUTO: 41.3 % (ref 34.8–46.1)
HDLC SERPL-MCNC: 41 MG/DL
HGB BLD-MCNC: 13.2 G/DL (ref 11.5–15.4)
IMM GRANULOCYTES # BLD AUTO: 0.03 THOUSAND/UL (ref 0–0.2)
IMM GRANULOCYTES NFR BLD AUTO: 1 % (ref 0–2)
LDLC SERPL CALC-MCNC: 123 MG/DL (ref 0–100)
LYMPHOCYTES # BLD AUTO: 1.82 THOUSANDS/ΜL (ref 0.6–4.47)
LYMPHOCYTES NFR BLD AUTO: 28 % (ref 14–44)
MCH RBC QN AUTO: 28.9 PG (ref 26.8–34.3)
MCHC RBC AUTO-ENTMCNC: 32 G/DL (ref 31.4–37.4)
MCV RBC AUTO: 91 FL (ref 82–98)
MONOCYTES # BLD AUTO: 0.48 THOUSAND/ΜL (ref 0.17–1.22)
MONOCYTES NFR BLD AUTO: 7 % (ref 4–12)
NEUTROPHILS # BLD AUTO: 4.01 THOUSANDS/ΜL (ref 1.85–7.62)
NEUTS SEG NFR BLD AUTO: 60 % (ref 43–75)
NRBC BLD AUTO-RTO: 0 /100 WBCS
PLATELET # BLD AUTO: 308 THOUSANDS/UL (ref 149–390)
PMV BLD AUTO: 10.8 FL (ref 8.9–12.7)
POTASSIUM SERPL-SCNC: 4.6 MMOL/L (ref 3.5–5.3)
PROT SERPL-MCNC: 7.9 G/DL (ref 6.4–8.2)
RBC # BLD AUTO: 4.56 MILLION/UL (ref 3.81–5.12)
SODIUM SERPL-SCNC: 137 MMOL/L (ref 136–145)
TRIGL SERPL-MCNC: 88 MG/DL
TSH SERPL DL<=0.05 MIU/L-ACNC: 1.55 UIU/ML (ref 0.36–3.74)
WBC # BLD AUTO: 6.59 THOUSAND/UL (ref 4.31–10.16)

## 2021-07-08 PROCEDURE — 80053 COMPREHEN METABOLIC PANEL: CPT

## 2021-07-08 PROCEDURE — 36415 COLL VENOUS BLD VENIPUNCTURE: CPT

## 2021-07-08 PROCEDURE — 85025 COMPLETE CBC W/AUTO DIFF WBC: CPT

## 2021-07-08 PROCEDURE — 80061 LIPID PANEL: CPT

## 2021-07-08 PROCEDURE — 84443 ASSAY THYROID STIM HORMONE: CPT

## 2021-07-08 PROCEDURE — 83036 HEMOGLOBIN GLYCOSYLATED A1C: CPT

## 2021-07-12 ENCOUNTER — OFFICE VISIT (OUTPATIENT)
Dept: FAMILY MEDICINE CLINIC | Facility: CLINIC | Age: 61
End: 2021-07-12
Payer: COMMERCIAL

## 2021-07-12 VITALS
HEART RATE: 62 BPM | BODY MASS INDEX: 39.71 KG/M2 | SYSTOLIC BLOOD PRESSURE: 140 MMHG | DIASTOLIC BLOOD PRESSURE: 80 MMHG | RESPIRATION RATE: 16 BRPM | WEIGHT: 253 LBS | OXYGEN SATURATION: 99 % | TEMPERATURE: 97.9 F | HEIGHT: 67 IN

## 2021-07-12 DIAGNOSIS — E03.8 SUBCLINICAL HYPOTHYROIDISM: ICD-10-CM

## 2021-07-12 DIAGNOSIS — R73.09 ABNORMAL BLOOD SUGAR: Primary | ICD-10-CM

## 2021-07-12 DIAGNOSIS — T78.40XS ALLERGIC REACTION, SEQUELA: ICD-10-CM

## 2021-07-12 DIAGNOSIS — B02.9 HERPES ZOSTER WITHOUT COMPLICATION: ICD-10-CM

## 2021-07-12 DIAGNOSIS — E78.2 MIXED HYPERLIPIDEMIA: ICD-10-CM

## 2021-07-12 DIAGNOSIS — R00.1 SINUS BRADYCARDIA: ICD-10-CM

## 2021-07-12 DIAGNOSIS — E55.9 VITAMIN D DEFICIENCY: ICD-10-CM

## 2021-07-12 PROCEDURE — 99214 OFFICE O/P EST MOD 30 MIN: CPT | Performed by: FAMILY MEDICINE

## 2021-07-12 RX ORDER — LEVOTHYROXINE SODIUM 0.05 MG/1
50 TABLET ORAL DAILY
Qty: 90 TABLET | Refills: 1 | Status: SHIPPED | OUTPATIENT
Start: 2021-07-12 | End: 2022-01-19 | Stop reason: SDUPTHER

## 2021-07-12 NOTE — PROGRESS NOTES
[facility]      NAME: Tiffany Humphrey  AGE: 63 y o  SEX: female  : 1960   MRN: [id number]    DATE: 2021  TIME: 5:45 PM    Assessment and Plan     Problem List Items Addressed This Visit     Mixed hyperlipidemia      Cholesterol from  was 182,   Continue diet and exercise  We will continue to monitor         Sinus bradycardia      Status post pacemaker insertion due to a syncopal episode/ bradycardia   Patient subsequently had battery generator replaced in , and again in   She was recently told by her cardiologist that she did not need further follow-up         BMI 39 0-39 9,adult      BMI 39 63  Patient started a formal exercise program this summer  She is walking every day  Will continue to monitor         Abnormal blood sugar - Primary      Blood sugar from 1999, A1c 5 6%  Continue diet, exercise, weight loss  Will continue to monitor         Allergic reaction      Patient has EpiPen for allergic reactions         Subclinical hypothyroidism      TSH from  was normal         Relevant Medications    levothyroxine 50 mcg tablet    Vitamin D deficiency      Continue OTC supplement         Herpes zoster without complication      Status post zoster infection 2019 with left flank involvement  Symptoms have  Resolved  Patient has had Shingrix vaccination              patient COVID vaccination      Return to office in: 6 mos, yearly labs 2022    Chief Complaint     Chief Complaint   Patient presents with    Follow-up     6 months       History of Present Illness      Patient presents for recheck of chronic medical problems patient had labs drawn on   Do well on levothyroxine for hypothyroid  Patient started walking on a regular basis this summer    Patient was released by her cardiologist recently      The following portions of the patient's history were reviewed and updated as appropriate: allergies, current medications, past family history, past medical history, past social history, past surgical history and problem list     Review of Systems   Review of Systems   Respiratory: Negative  Cardiovascular: Negative  Gastrointestinal: Negative  Genitourinary: Negative  Active Problem List     Patient Active Problem List   Diagnosis    Mixed hyperlipidemia    Sinus bradycardia    Left hand paresthesia    Cervical radiculopathy    BMI 39 0-39 9,adult    Abnormal blood sugar    Allergic rhinitis    Allergic reaction    Subclinical hypothyroidism    Pacemaker    Vitamin D deficiency    Hand pain, right    Herpes zoster without complication    Status post abdominal hysterectomy       Objective   /80 (BP Location: Right arm, Patient Position: Sitting, Cuff Size: Large)   Pulse 62   Temp 97 9 °F (36 6 °C) (Tympanic)   Resp 16   Ht 5' 7" (1 702 m)   Wt 115 kg (253 lb)   LMP  (LMP Unknown)   SpO2 99%   BMI 39 63 kg/m²     Physical Exam  Cardiovascular:      Rate and Rhythm: Normal rate and regular rhythm  Heart sounds: Normal heart sounds  Comments: Carotids: no bruits  Ext: no edema  Pulmonary:      Effort: Pulmonary effort is normal  No respiratory distress  Breath sounds: No wheezing or rales  Psychiatric:         Behavior: Behavior normal          Thought Content:  Thought content normal          Pertinent Laboratory/Diagnostic Studies:  labs reviewed    Current Medications     Current Outpatient Medications:     aspirin 81 MG tablet, Take 81 mg by mouth daily, Disp: , Rfl:     azelastine (ASTELIN) 0 1 % nasal spray, 2 sprays into each nostril daily Use in each nostril as directed, Disp: , Rfl:     Cholecalciferol (VITAMIN D-3 PO), Take 4,000 Units by mouth daily, Disp: , Rfl:     EPINEPHrine (EPIPEN) 0 3 mg/0 3 mL SOAJ, Inject 0 3 mL (0 3 mg total) into a muscle as needed for anaphylaxis (twin pack), Disp: 2 each, Rfl: 3    FLUTICASONE PROPIONATE, NASAL, NA, 2 sprays into each nostril as needed  , Disp: , Rfl:     levothyroxine 50 mcg tablet, Take 1 tablet (50 mcg total) by mouth daily, Disp: 90 tablet, Rfl: 1    loratadine (CLARITIN) 10 mg tablet, Take 10 mg by mouth daily, Disp: , Rfl:     Omega-3 Fatty Acids (FISH OIL PO), Take 2,000 mg by mouth 2 (two) times a day, Disp: , Rfl:     Red Yeast Rice Extract (RED YEAST RICE PO), Take 1,200 mg by mouth 2 (two) times a day, Disp: , Rfl:     Health Maintenance     Health Maintenance   Topic Date Due    Hepatitis C Screening  Never done    HIV Screening  Never done    DTaP,Tdap,and Td Vaccines (1 - Tdap) Never done    PT PLAN OF CARE  11/16/2019    BMI: Followup Plan  01/16/2021    MAMMOGRAM  06/16/2021    Annual Physical  08/17/2021    Influenza Vaccine (1) 09/01/2021    Depression Screening PHQ  01/18/2022    BMI: Adult  07/12/2022    Colorectal Cancer Screening  08/22/2023    COVID-19 Vaccine  Completed    Pneumococcal Vaccine: Pediatrics (0 to 5 Years) and At-Risk Patients (6 to 59 Years)  Aged Out    HIB Vaccine  Aged Out    Hepatitis B Vaccine  Aged Out    IPV Vaccine  Aged Out    Hepatitis A Vaccine  Aged Out    Meningococcal ACWY Vaccine  Aged Out    HPV Vaccine  Aged [employer] History   Administered Date(s) Administered    INFLUENZA 10/01/2016, 10/02/2017, 10/30/2019, 10/20/2020    Influenza Quadrivalent, 6-35 Months IM 10/02/2017    Influenza, Quadrivalent (nasal) 10/01/2016    SARS-CoV-2 / COVID-19 mRNA IM ([employer]) 12/23/2020, 01/11/2021    Zoster Vaccine Recombinant 07/17/2020, 10/19/2020       Humberto Rosenbaum DO  [facility]

## 2021-07-12 NOTE — ASSESSMENT & PLAN NOTE
Blood sugar from 7/8/1999, A1c 5 6%  Continue diet, exercise, weight loss    Will continue to monitor

## 2021-07-12 NOTE — ASSESSMENT & PLAN NOTE
Status post zoster infection November 2019 with left flank involvement  Symptoms have  Resolved    Patient has had Shingrix vaccination

## 2021-07-12 NOTE — ASSESSMENT & PLAN NOTE
BMI 39 63  Patient started a formal exercise program this summer  She is walking every day    Will continue to monitor

## 2021-07-12 NOTE — ASSESSMENT & PLAN NOTE
Status post pacemaker insertion due to a syncopal episode/ bradycardia 1998  Patient subsequently had battery generator replaced in 2007, and again in 2020   She was recently told by her cardiologist that she did not need further follow-up

## 2021-08-09 ENCOUNTER — HOSPITAL ENCOUNTER (OUTPATIENT)
Dept: MAMMOGRAPHY | Facility: MEDICAL CENTER | Age: 61
Discharge: HOME | End: 2021-08-09
Payer: COMMERCIAL

## 2021-08-09 VITALS — BODY MASS INDEX: 39.71 KG/M2 | WEIGHT: 253 LBS | HEIGHT: 67 IN

## 2021-08-09 DIAGNOSIS — Z12.31 SCREENING MAMMOGRAM, ENCOUNTER FOR: ICD-10-CM

## 2021-08-09 PROCEDURE — 77067 SCR MAMMO BI INCL CAD: CPT

## 2021-08-09 PROCEDURE — 77063 BREAST TOMOSYNTHESIS BI: CPT

## 2021-08-10 ENCOUNTER — REMOTE DEVICE CLINIC VISIT (OUTPATIENT)
Dept: CARDIOLOGY CLINIC | Facility: CLINIC | Age: 61
End: 2021-08-10
Payer: COMMERCIAL

## 2021-08-10 DIAGNOSIS — Z95.0 PRESENCE OF CARDIAC PACEMAKER: Primary | ICD-10-CM

## 2021-08-10 PROCEDURE — 93296 REM INTERROG EVL PM/IDS: CPT | Performed by: INTERNAL MEDICINE

## 2021-08-10 PROCEDURE — 93294 REM INTERROG EVL PM/LDLS PM: CPT | Performed by: INTERNAL MEDICINE

## 2021-08-10 NOTE — PROGRESS NOTES
Results for orders placed or performed in visit on 08/10/21   Cardiac EP device report    Narrative    MDT-DUAL CHAMBER PPM (AAI-DDD MODE) / NOT MRI CONDITIONAL  CARELINK TRANSMISSION: BATTERY VOLTAGE ADEQUATE (10 8 YRS)  AP: 36 9%  : 0 7% (MVP-ON)  ALL AVAILABLE LEAD PARAMETERS WITHIN NORMAL LIMITS  1 FAST A&V MONITORED EPISODE W/ EGRM SHOWING SVT-ST W/  BPM, DURATION 21 SECS  709 RATE DROP EPISODES (1 2 PER DAY~HX OF SAME)  PT TAKES ASA 81MG  EF: 55% (ECHO 1/10/20)  PACEMAKER FUNCTIONING APPROPRIATELY    [address] [place] [address] Street

## 2021-08-25 ENCOUNTER — ANNUAL EXAM (OUTPATIENT)
Dept: OBGYN CLINIC | Facility: CLINIC | Age: 61
End: 2021-08-25
Payer: COMMERCIAL

## 2021-08-25 VITALS
HEIGHT: 66 IN | WEIGHT: 244.8 LBS | BODY MASS INDEX: 39.34 KG/M2 | SYSTOLIC BLOOD PRESSURE: 112 MMHG | DIASTOLIC BLOOD PRESSURE: 72 MMHG

## 2021-08-25 DIAGNOSIS — Z01.419 ENCOUNTER FOR ANNUAL ROUTINE GYNECOLOGICAL EXAMINATION: Primary | ICD-10-CM

## 2021-08-25 DIAGNOSIS — Z86.79 HISTORY OF SINUS BRADYCARDIA: ICD-10-CM

## 2021-08-25 DIAGNOSIS — Z90.710 STATUS POST LAPAROSCOPIC ASSISTED VAGINAL HYSTERECTOMY (LAVH): ICD-10-CM

## 2021-08-25 DIAGNOSIS — E66.01 MORBID OBESITY (HCC): ICD-10-CM

## 2021-08-25 DIAGNOSIS — E03.9 HYPOTHYROIDISM (ACQUIRED): ICD-10-CM

## 2021-08-25 DIAGNOSIS — Z95.0 PACEMAKER: ICD-10-CM

## 2021-08-25 PROCEDURE — 99396 PREV VISIT EST AGE 40-64: CPT | Performed by: OBSTETRICS & GYNECOLOGY

## 2021-08-25 NOTE — PROGRESS NOTES
Assessment     Annual well-woman exam    Status post LAVH, uterine fibroids and abnormal uterine bleeding    History of sinus bradycardia    Pacemaker    Hypothyroidism    Morbid obesity        Plan     Annual mammogram completed, within normal limits    Pap smear deferred secondary to hysterectomy   All questions answered  Subjective  Here for annual exam     hCon Velázquez is a 63 y o  female who presents for annual exam  Periods are absent patient had a hysterectomy many years ago for uterine fibroids and abnormal uterine bleeding  She has a history of a pacemaker in place for chronic bradycardia syndrome  Did complete her mammogram recently which was normal   No further Pap smears are indicated  The patient reports that there is not domestic violence in her life  Current contraception: status post hysterectomy  History of abnormal Pap smear: no  Family history of uterine or ovarian cancer: no  Regular self breast exam: yes  History of abnormal mammogram: no  Family history of breast cancer: yes -   Daughter  History of abnormal lipids: no  Menstrual History:  OB History        5    Para   4    Term   3       1    AB   1    Living   3       SAB   1    TAB        Ectopic        Multiple        Live Births                    Menarche age: 17  No LMP recorded (lmp unknown)  Patient has had a hysterectomy  Review of Systems  Pertinent items are noted in HPI        Objective  No acute distress   /72 (BP Location: Left arm, Patient Position: Sitting, Cuff Size: Standard)   Ht 5' 6 25" (1 683 m)   Wt 111 kg (244 lb 12 8 oz)   LMP  (LMP Unknown)   BMI 39 21 kg/m²     General:   alert and oriented, in no acute distress, alert, appears stated age and cooperative   Heart: regular rate and rhythm, S1, S2 normal, no murmur, click, rub or gallop   Lungs: clear to auscultation bilaterally   Abdomen: soft, non-tender, without masses or organomegaly   Vulva: normal, Bartholin's, Urethra, Zimmerman's normal, female escutcheon   Vagina: normal mucosa, normal discharge, no palpable nodules   Cervix: surgically absent   Uterus: surgically absent   Adnexa: normal adnexa   Bilateral breast exam in the sitting and supine position with chaperone present, no visible or palpable breast lesions identified  No breast masses noted  No supraclavicular or axillary lymphadenopathy noted  No nipple discharge  Reviewed self-breast exam techniques     Rectal exam,  deferred

## 2021-09-30 ENCOUNTER — IMMUNIZATIONS (OUTPATIENT)
Dept: FAMILY MEDICINE CLINIC | Facility: HOSPITAL | Age: 61
End: 2021-09-30

## 2021-09-30 DIAGNOSIS — Z23 ENCOUNTER FOR IMMUNIZATION: Primary | ICD-10-CM

## 2021-09-30 PROCEDURE — 0001A SARS-COV-2 / COVID-19 MRNA VACCINE (PFIZER-BIONTECH) 30 MCG: CPT

## 2021-09-30 PROCEDURE — 91300 SARS-COV-2 / COVID-19 MRNA VACCINE (PFIZER-BIONTECH) 30 MCG: CPT

## 2021-10-01 DIAGNOSIS — Z11.9 SCREENING EXAMINATION FOR UNSPECIFIED INFECTIOUS DISEASE: Primary | ICD-10-CM

## 2021-10-11 ENCOUNTER — CLINICAL SUPPORT (OUTPATIENT)
Dept: FAMILY MEDICINE CLINIC | Facility: CLINIC | Age: 61
End: 2021-10-11

## 2021-10-11 DIAGNOSIS — Z11.52 ENCOUNTER FOR SCREENING FOR COVID-19: Primary | ICD-10-CM

## 2021-10-11 PROCEDURE — U0005 INFEC AGEN DETEC AMPLI PROBE: HCPCS | Performed by: FAMILY MEDICINE

## 2021-10-11 PROCEDURE — U0003 INFECTIOUS AGENT DETECTION BY NUCLEIC ACID (DNA OR RNA); SEVERE ACUTE RESPIRATORY SYNDROME CORONAVIRUS 2 (SARS-COV-2) (CORONAVIRUS DISEASE [COVID-19]), AMPLIFIED PROBE TECHNIQUE, MAKING USE OF HIGH THROUGHPUT TECHNOLOGIES AS DESCRIBED BY CMS-2020-01-R: HCPCS | Performed by: FAMILY MEDICINE

## 2021-10-12 LAB — SARS-COV-2 RNA RESP QL NAA+PROBE: NEGATIVE

## 2021-11-03 ENCOUNTER — CLINICAL SUPPORT (OUTPATIENT)
Dept: FAMILY MEDICINE CLINIC | Facility: CLINIC | Age: 61
End: 2021-11-03

## 2021-11-03 DIAGNOSIS — U07.1 COVID-19: Primary | ICD-10-CM

## 2021-11-03 PROCEDURE — U0005 INFEC AGEN DETEC AMPLI PROBE: HCPCS | Performed by: FAMILY MEDICINE

## 2021-11-03 PROCEDURE — U0003 INFECTIOUS AGENT DETECTION BY NUCLEIC ACID (DNA OR RNA); SEVERE ACUTE RESPIRATORY SYNDROME CORONAVIRUS 2 (SARS-COV-2) (CORONAVIRUS DISEASE [COVID-19]), AMPLIFIED PROBE TECHNIQUE, MAKING USE OF HIGH THROUGHPUT TECHNOLOGIES AS DESCRIBED BY CMS-2020-01-R: HCPCS | Performed by: FAMILY MEDICINE

## 2021-11-04 LAB — SARS-COV-2 RNA RESP QL NAA+PROBE: NEGATIVE

## 2021-11-09 ENCOUNTER — REMOTE DEVICE CLINIC VISIT (OUTPATIENT)
Dept: CARDIOLOGY CLINIC | Facility: CLINIC | Age: 61
End: 2021-11-09
Payer: COMMERCIAL

## 2021-11-09 DIAGNOSIS — Z95.0 PRESENCE OF PERMANENT CARDIAC PACEMAKER: Primary | ICD-10-CM

## 2021-11-09 PROCEDURE — 93296 REM INTERROG EVL PM/IDS: CPT | Performed by: INTERNAL MEDICINE

## 2021-11-09 PROCEDURE — 93294 REM INTERROG EVL PM/LDLS PM: CPT | Performed by: INTERNAL MEDICINE

## 2021-11-23 ENCOUNTER — TELEPHONE (OUTPATIENT)
Dept: OBGYN CLINIC | Facility: HOSPITAL | Age: 61
End: 2021-11-23

## 2022-01-12 ENCOUNTER — OFFICE VISIT (OUTPATIENT)
Dept: OBGYN CLINIC | Facility: OTHER | Age: 62
End: 2022-01-12
Payer: COMMERCIAL

## 2022-01-12 ENCOUNTER — APPOINTMENT (OUTPATIENT)
Dept: RADIOLOGY | Facility: OTHER | Age: 62
End: 2022-01-12
Payer: COMMERCIAL

## 2022-01-12 VITALS
HEIGHT: 67 IN | DIASTOLIC BLOOD PRESSURE: 81 MMHG | WEIGHT: 244 LBS | BODY MASS INDEX: 38.3 KG/M2 | SYSTOLIC BLOOD PRESSURE: 157 MMHG | HEART RATE: 54 BPM

## 2022-01-12 DIAGNOSIS — M25.511 RIGHT SHOULDER PAIN, UNSPECIFIED CHRONICITY: Primary | ICD-10-CM

## 2022-01-12 DIAGNOSIS — M25.511 RIGHT SHOULDER PAIN, UNSPECIFIED CHRONICITY: ICD-10-CM

## 2022-01-12 DIAGNOSIS — M19.011 OSTEOARTHRITIS OF GLENOHUMERAL JOINT, RIGHT: ICD-10-CM

## 2022-01-12 PROCEDURE — 99203 OFFICE O/P NEW LOW 30 MIN: CPT | Performed by: ORTHOPAEDIC SURGERY

## 2022-01-12 PROCEDURE — 73030 X-RAY EXAM OF SHOULDER: CPT

## 2022-01-12 PROCEDURE — 20610 DRAIN/INJ JOINT/BURSA W/O US: CPT | Performed by: ORTHOPAEDIC SURGERY

## 2022-01-12 RX ORDER — BUPIVACAINE HYDROCHLORIDE 2.5 MG/ML
4 INJECTION, SOLUTION INFILTRATION; PERINEURAL
Status: COMPLETED | OUTPATIENT
Start: 2022-01-12 | End: 2022-01-12

## 2022-01-12 RX ORDER — TRIAMCINOLONE ACETONIDE 40 MG/ML
40 INJECTION, SUSPENSION INTRA-ARTICULAR; INTRAMUSCULAR
Status: COMPLETED | OUTPATIENT
Start: 2022-01-12 | End: 2022-01-12

## 2022-01-12 RX ADMIN — TRIAMCINOLONE ACETONIDE 40 MG: 40 INJECTION, SUSPENSION INTRA-ARTICULAR; INTRAMUSCULAR at 12:35

## 2022-01-12 RX ADMIN — BUPIVACAINE HYDROCHLORIDE 4 ML: 2.5 INJECTION, SOLUTION INFILTRATION; PERINEURAL at 12:35

## 2022-01-12 NOTE — PROGRESS NOTES
Orthopaedic Surgery - Office Note  Marlin Spangler (89 y o  female)   : 1960   MRN: [id number]  Encounter Date: 2022    Chief Complaint   Patient presents with    Right Shoulder - Pain       Assessment / Plan  R shoulder mild glenohumeral osteoarthritis, initial symptoms began spring 2020 which have progressively worsened      · Activity as tolerated to the right shoulder  · Tylenol as needed for pain relief  · CSI provided today  · PT referral provided to focus on stretching vs strengthening   · If patient has no response the the above measures can consider CT arthrogram for further evaluation of rotator cuff   Return if symptoms worsen or fail to improve  History of Present Illness  Marlin Spangler is a 63 y o  female who presents for evaluation of right shoulder pain, this has been ongoing for roughly two years  She denies any overt traumatic injury to the shoulder, the pain has been progressively worsening over that period  Pain is worse with overhead motion and heavy lifting and relieved with rest   Patient has a history of L shoulder rotator cuff tear and repair and states the symptoms are similar but more severe  She denies any feelings of instability and denies any numbness or tingling  Review of Systems  Pertinent items are noted in HPI  All other systems were reviewed and are negative  Physical Exam  /81   Pulse (!) 54   Ht 5' 7" (1 702 m)   Wt 111 kg (244 lb)   LMP  (LMP Unknown)   BMI 38 22 kg/m²   Cons: Appears well  No apparent distress  Psych: Alert  Oriented x3  Mood and affect normal   Eyes: PERRLA, EOMI  Resp: Normal effort  No audible wheezing or stridor  CV: Palpable pulse  No discernable arrhythmia  No LE edema  Lymph:  No palpable cervical, axillary, or inguinal lymphadenopathy  Skin: Warm  No palpable masses  No visible lesions  Neuro: Normal muscle tone  Normal and symmetric DTR's       Right Shoulder Exam  Alignment / Posture:  Normal shoulder posture  Inspection:  No swelling  No edema  No erythema  Palpation:  No tenderness  ROM:  Shoulder   Shoulder ER 45  Shoulder IR L4  Strength:  Supraspinatus 4/5  Subscapularis 4/5  Teres minor 4/5  Deltoid 5/5  Biceps 4+/5  Stability:  No objective shoulder instability  Tests: (+) Fernandez  (+) Bear hug  (+) Raynald Mends  (-) Drop arm  Neurovascular:  Sensation intact in Ax/R/M/U nerve distributions  2+ radial pulse  Studies Reviewed  I have personally reviewed pertinent films in PACS  XR of the right shoulder is negative for fracture or dislocation, mild degenerative changes noted to the glenohumeral joint     Large joint arthrocentesis: R glenohumeral  [place] Protocol:  Consent: Verbal consent obtained  Consent given by: patient  Patient identity confirmed: verbally with patient    Supporting Documentation  Indications: pain   Procedure Details  Location: shoulder - R glenohumeral  Needle size: 22 G  Approach: anterior  Medications administered: 4 mL bupivacaine 0 25 %; 40 mg triamcinolone acetonide 40 mg/mL    Patient tolerance: patient tolerated the procedure well with no immediate complications  Dressing:  Sterile dressing applied            Medical, Surgical, Family, and Social History  The patient's medical history, family history, and social history, were reviewed and updated as appropriate      Past Medical History:   Diagnosis Date    Abnormal blood chemistry     last assessed: 6/9/2014    Allergic reaction     last assessed: 9/17/2012    Allergic rhinitis     last assessed: 6/9/2014    Arthralgia of left shoulder region     last assessed: 12/3/2013    Arthritis     Dysautonomia ([address] )     Elevated blood pressure reading without diagnosis of hypertension     last assessed: 9/30/2016    Endometriosis     Familial combined hyperlipidemia     last assessed: 6/9/2014    Female infertility     Fibroid     Functional murmur     description: soft systolic murmur - no valvular disease on 2D echo Last assessed: 2014    Hypertension     Obstruction of eustachian tube     unspecified laterality Last assessed: 3/17/2014    Polycystic ovary syndrome     PONV (postoperative nausea and vomiting)     Subclinical hypothyroidism 2019    Symptomatic bradycardia     Syncope     Wears glasses        Past Surgical History:   Procedure Laterality Date    APPENDECTOMY      CARDIAC CATHETERIZATION      Description: Normal EF, No obstructive coronary artery disease, systolic hypertension  [facility]rows [facility] at [facility] Resolved: 1998    CARDIAC PACEMAKER PLACEMENT Left      SECTION      COLONOSCOPY      DEBRIDEMENT TENNIS ELBOW      GANGLION CYST EXCISION Left     hand    HYSTERECTOMY      KNEE ARTHROSCOPY Right     AR SHLDR ARTHROSCOP,SURG,W/ROTAT CUFF REPR Left 10/3/2016    Procedure: ARTHROSCOPY SHOULDER, ROTATOR CUFF REPAIR,SUBACROMIAL DECOMPRESSION ; MICROFRACTURE MIKAELA GLENOID HEAD;  Surgeon: Froilan Santos MD;  Location: [facility];  Service: Orthopedics    TONSILLECTOMY      WISDOM TOOTH EXTRACTION         Family History   Problem Relation Age of Onset    Breast cancer Mother 76    Ovarian cancer Mother 80    Multiple myeloma Mother     Cancer Father         liver    Allergies Family         bronchitis    Diabetes Family     Seizures Family     Heart disease Family     Hypertension Family     Skin cancer Family     No Known Problems Sister     No Known Problems Daughter     Colon cancer Maternal Grandmother     Cancer Maternal Grandfather         thyroid    No Known Problems Sister     No Known Problems Sister     No Known Problems Daughter     No Known Problems Maternal Aunt     No Known Problems Paternal Aunt     Cancer Paternal Aunt         liver    No Known Problems Sister        Social History     Occupational History    Not on file   Tobacco Use    Smoking status: Never Smoker    Smokeless tobacco: Never Used   Vaping Use    Vaping Use: Never used   Substance and Sexual Activity    Alcohol use: Yes     Comment: rare    Drug use: No    Sexual activity: Yes     Birth control/protection: Female Sterilization       Allergies   Allergen Reactions    Shellfish Allergy - Food Allergy Anaphylaxis    Shellfish-Derived Products - Food Allergy Anaphylaxis    Shrimp Flavor - Food Allergy Anaphylaxis    Darvon [Propoxyphene] Hives    Tramadol Hives    Ultram [Tramadol Hcl] Hives         Current Outpatient Medications:     aspirin 81 MG tablet, Take 81 mg by mouth daily, Disp: , Rfl:     azelastine (ASTELIN) 0 1 % nasal spray, 2 sprays into each nostril daily Use in each nostril as directed, Disp: , Rfl:     Cholecalciferol (VITAMIN D-3 PO), Take 4,000 Units by mouth daily, Disp: , Rfl:     EPINEPHrine (EPIPEN) 0 3 mg/0 3 mL SOAJ, Inject 0 3 mL (0 3 mg total) into a muscle as needed for anaphylaxis (twin pack), Disp: 2 each, Rfl: 3    FLUTICASONE PROPIONATE, NASAL, NA, 2 sprays into each nostril as needed  , Disp: , Rfl:     levothyroxine 50 mcg tablet, Take 1 tablet (50 mcg total) by mouth daily, Disp: 90 tablet, Rfl: 1    loratadine (CLARITIN) 10 mg tablet, Take 10 mg by mouth daily, Disp: , Rfl:     Omega-3 Fatty Acids (FISH OIL PO), Take 2,000 mg by mouth 2 (two) times a day, Disp: , Rfl:     Red Yeast Rice Extract (RED YEAST RICE PO), Take 1,200 mg by mouth 2 (two) times a day, Disp: , Rfl:       Connor Casillas MD

## 2022-01-18 ENCOUNTER — EVALUATION (OUTPATIENT)
Dept: PHYSICAL THERAPY | Facility: MEDICAL CENTER | Age: 62
End: 2022-01-18
Payer: COMMERCIAL

## 2022-01-18 DIAGNOSIS — M19.011 OSTEOARTHRITIS OF GLENOHUMERAL JOINT, RIGHT: Primary | ICD-10-CM

## 2022-01-18 PROCEDURE — 97161 PT EVAL LOW COMPLEX 20 MIN: CPT | Performed by: PHYSICAL THERAPIST

## 2022-01-18 NOTE — PROGRESS NOTES
PT Evaluation     Today's date: 2022  Patient name: Itzel Contreras  : 1960  MRN: [id number]  Referring provider: Sam Whitten MD  Dx:   Encounter Diagnosis     ICD-10-CM    1  Osteoarthritis of glenohumeral joint, right  M19 [id number] Ambulatory Referral to Physical Therapy                  Assessment  Assessment details: Itzel Contreras is a 63 y o  female was evaluated on 2022  for Osteoarthritis of glenohumeral joint, right  (primary encounter diagnosis)  Itzel Contreras has the above listed impairments resulting in functional deficits and negative impact to quality of life  Patient is appropriate for skilled PT intervention to promote maximal return to function and patient specific goals  Patient agrees with outlined treatment plan and all questions were answered to their satisfaction  Impairments: abnormal muscle firing, abnormal muscle tone, abnormal or restricted ROM, impaired physical strength, lacks appropriate home exercise program and pain with function  Understanding of Dx/Px/POC: good   Prognosis: good    Goals  Patient will successfully transition to home exercise program   Patient will be able to manage symptoms independently      Jordin Soto will report no limitation reaching behind back   Jordin Soto will report no limitation in working with a 5LB object overhead       Plan  Patient would benefit from: skilled PT  Referral necessary: No  Planned modality interventions: thermotherapy: hydrocollator packs  Planned therapy interventions: home exercise program, manual therapy, neuromuscular re-education, patient education, functional ROM exercises, strengthening, stretching, joint mobilization, graded activity, graded exercise, therapeutic exercise, body mechanics training, motor coordination training and activity modification  Frequency: 1x week  Duration in weeks: 12  Treatment plan discussed with: patient        Subjective Evaluation    History of Present Illness  Mechanism of injury: Itzel Contreras is a 63 y o  female presenting to therapy with complaints of right shoulder pain ongoing for many months to a year  She had been noticing progressive pain and feeling of weakness when she was doing daily tasks, particularly overhead tasks and swimming difficulty in the summer  She had radiographs indicating mild OA to GHJ  She did receive an injection which seemed to help calm her shoulder down a bit a this point  She still feels limited in daily tasks including reaching overhead with any kind of weight or load  Pain  Current pain ratin  At best pain ratin  At worst pain ratin  Quality: dull ache, discomfort, sharp and tight    Patient Goals  Patient goals for therapy: decreased pain, return to sport/leisure activities, independence with ADLs/IADLs, increased strength and increased motion          Objective     Tenderness     Left Shoulder   No tenderness     Right Shoulder  Tenderness in the supraspinatus tendon  Cervical/Thoracic Screen   Cervical range of motion within normal limits  Thoracic range of motion within normal limits    Neurological Testing     Sensation     Shoulder   Left Shoulder   Intact: light touch    Right Shoulder   Intact: light touch    Active Range of Motion   Left Shoulder   Flexion: 160 degrees   Extension: WFL  Abduction: WFL  External rotation BTH: WFL  Internal rotation BTB: T6     Right Shoulder   Flexion: 140 degrees with pain  Extension: WFL  Abduction: 140 degrees   External rotation BTH: WFL  Internal rotation BTB: T10     Passive Range of Motion   Left Shoulder   Flexion: 160 degrees   Abduction: 160 degrees   Internal rotation 90°: 45 degrees     Right Shoulder   Flexion: 150 degrees with pain  Abduction: 145 degrees   Internal rotation 90°: 15 degrees     Scapular Mobility   Left Shoulder   Scapular Dyskinesis: grade I    Right Shoulder   Scapular Dyskinesis: grade II    Joint Play     Right Shoulder  Hypomobile in the posterior capsule       Strength/Myotome Testing     Left Shoulder     Planes of Motion   Flexion: 4+   Extension: 4+   Abduction: 4+   External rotation at 0°: 4+     Right Shoulder     Planes of Motion   Flexion: 4+   Extension: 4+   Abduction: 4+   External rotation at 0°: 4-              Precautions: None      Manuals 1/18            GHJ posterior glides/inferior glides  AF                                                   Neuro Re-Ed                                                                                                        Ther Ex                                                                                                                     Ther Activity                                       Gait Training                                       Modalities

## 2022-01-19 ENCOUNTER — OFFICE VISIT (OUTPATIENT)
Dept: FAMILY MEDICINE CLINIC | Facility: CLINIC | Age: 62
End: 2022-01-19
Payer: COMMERCIAL

## 2022-01-19 VITALS
TEMPERATURE: 97.7 F | DIASTOLIC BLOOD PRESSURE: 78 MMHG | BODY MASS INDEX: 37.83 KG/M2 | WEIGHT: 241 LBS | RESPIRATION RATE: 16 BRPM | OXYGEN SATURATION: 98 % | HEIGHT: 67 IN | SYSTOLIC BLOOD PRESSURE: 120 MMHG | HEART RATE: 65 BPM

## 2022-01-19 DIAGNOSIS — E03.8 SUBCLINICAL HYPOTHYROIDISM: ICD-10-CM

## 2022-01-19 DIAGNOSIS — Z23 NEED FOR VACCINATION: Primary | ICD-10-CM

## 2022-01-19 DIAGNOSIS — T78.40XS ALLERGIC REACTION, SEQUELA: ICD-10-CM

## 2022-01-19 DIAGNOSIS — E55.9 VITAMIN D DEFICIENCY: ICD-10-CM

## 2022-01-19 DIAGNOSIS — Z13.0 SCREENING FOR DEFICIENCY ANEMIA: ICD-10-CM

## 2022-01-19 DIAGNOSIS — R00.1 SINUS BRADYCARDIA: ICD-10-CM

## 2022-01-19 DIAGNOSIS — M25.511 CHRONIC RIGHT SHOULDER PAIN: ICD-10-CM

## 2022-01-19 DIAGNOSIS — E66.09 CLASS 2 OBESITY DUE TO EXCESS CALORIES WITHOUT SERIOUS COMORBIDITY WITH BODY MASS INDEX (BMI) OF 37.0 TO 37.9 IN ADULT: ICD-10-CM

## 2022-01-19 DIAGNOSIS — R73.09 ABNORMAL BLOOD SUGAR: ICD-10-CM

## 2022-01-19 DIAGNOSIS — G89.29 CHRONIC RIGHT SHOULDER PAIN: ICD-10-CM

## 2022-01-19 DIAGNOSIS — E78.2 MIXED HYPERLIPIDEMIA: ICD-10-CM

## 2022-01-19 PROBLEM — E66.812 CLASS 2 OBESITY DUE TO EXCESS CALORIES WITHOUT SERIOUS COMORBIDITY WITH BODY MASS INDEX (BMI) OF 37.0 TO 37.9 IN ADULT: Status: ACTIVE | Noted: 2022-01-19

## 2022-01-19 PROCEDURE — 90715 TDAP VACCINE 7 YRS/> IM: CPT | Performed by: FAMILY MEDICINE

## 2022-01-19 PROCEDURE — 90471 IMMUNIZATION ADMIN: CPT | Performed by: FAMILY MEDICINE

## 2022-01-19 PROCEDURE — 99214 OFFICE O/P EST MOD 30 MIN: CPT | Performed by: FAMILY MEDICINE

## 2022-01-19 RX ORDER — CHLORAL HYDRATE 500 MG
4000 CAPSULE ORAL DAILY
Qty: 120 CAPSULE | Refills: 10 | Status: SHIPPED | OUTPATIENT
Start: 2022-01-19

## 2022-01-19 RX ORDER — CHOLECALCIFEROL (VITAMIN D3) 125 MCG
CAPSULE ORAL
Qty: 60 TABLET | Refills: 10 | Status: SHIPPED | OUTPATIENT
Start: 2022-01-19 | End: 2022-01-19 | Stop reason: SDUPTHER

## 2022-01-19 RX ORDER — LEVOTHYROXINE SODIUM 0.05 MG/1
50 TABLET ORAL DAILY
Qty: 90 TABLET | Refills: 1 | Status: SHIPPED | OUTPATIENT
Start: 2022-01-19 | End: 2022-07-20 | Stop reason: SDUPTHER

## 2022-01-19 RX ORDER — AMPICILLIN TRIHYDRATE 250 MG
1200 CAPSULE ORAL 2 TIMES DAILY
Qty: 120 CAPSULE | Refills: 10 | Status: SHIPPED | OUTPATIENT
Start: 2022-01-19

## 2022-01-19 RX ORDER — EPINEPHRINE 0.3 MG/.3ML
0.3 INJECTION SUBCUTANEOUS AS NEEDED
Qty: 2 EACH | Refills: 0 | Status: SHIPPED | OUTPATIENT
Start: 2022-01-19

## 2022-01-19 RX ORDER — CHOLECALCIFEROL (VITAMIN D3) 125 MCG
CAPSULE ORAL
Qty: 60 TABLET | Refills: 10 | Status: SHIPPED | OUTPATIENT
Start: 2022-01-19

## 2022-01-19 NOTE — PROGRESS NOTES
[facility]      NAME: Ezequiel Almazan  AGE: 63 y o  SEX: female  : 1960   MRN: [id number]    DATE: 2022  TIME: 8:39 AM    Assessment and Plan     Problem List Items Addressed This Visit     Mixed hyperlipidemia     Continue red yeast rice 2400 mg daily and Omega 3, 4000 mg daily  Continue low-fat diet exercise  Will continue to monitor         Relevant Medications    Omega-3 Fatty Acids (fish oil) 1,000 mg    Red Yeast Rice 600 MG CAPS    Other Relevant Orders    Lipid Panel with Direct LDL reflex    Sinus bradycardia     Status post pacemaker insertion due to syncopal episode/bradycardia   Patient had battery generator replaced in , and again in   Continue follow-up with cardiology as directed          Relevant Medications    EPINEPHrine (EPIPEN) 0 3 mg/0 3 mL SOAJ    Abnormal blood sugar     Continue reduced carbohydrate diet and exercise  Will continue to monitor         Relevant Orders    Comprehensive metabolic panel    Hemoglobin A1C    Allergic reaction     EpiPen refilled today         Relevant Medications    EPINEPHrine (EPIPEN) 0 3 mg/0 3 mL SOAJ    Subclinical hypothyroidism     Will continue to monitor         Relevant Medications    levothyroxine 50 mcg tablet    Other Relevant Orders    TSH, 3rd generation with Free T4 reflex    Vitamin D deficiency     Continue OTC vitamin-D, 4000 International Units daily         Relevant Medications    Cholecalciferol (Vitamin D3) 50 MCG ( UT) TABS    Class 2 obesity due to excess calories without serious comorbidity with body mass index (BMI) of 37 0 to 37 9 in adult     BMI today 37 83, was 39 63  Continue diet and exercise  Patient is still walking every day         Chronic right shoulder pain     Patient has been complaining of right shoulder pain for approximately 2 years  No history of trauma  Recently seen by Orthopedics  Patient was sent to physical therapy    She states she thinks she is already getting some benefit from this  Other Visit Diagnoses     Need for vaccination    -  Primary    Relevant Orders    TDAP VACCINE GREATER THAN OR EQUAL TO 6YO IM (Completed)    Screening for deficiency anemia        Relevant Orders    CBC and differential      Current with colonoscopy and mammography    Patient had COVID vaccination, and booster  Patient had flu shot  Patient had Shingrix vaccination  Adacel given today        Return to office in:  6 months, yearly fasting blood work prior    Chief Complaint     Chief Complaint   Patient presents with    Follow-up     6 months       History of Present Illness     Patient presents for recheck chronic medical problems  Having ongoing problems the right shoulder  Otherwise doing well  Last labs were July 2021      The following portions of the patient's history were reviewed and updated as appropriate: allergies, current medications, past family history, past medical history, past social history, past surgical history and problem list     Review of Systems   Review of Systems   Respiratory: Negative  Cardiovascular: Negative  Gastrointestinal: Negative  Genitourinary: Negative          Active Problem List     Patient Active Problem List   Diagnosis    Mixed hyperlipidemia    Sinus bradycardia    Left hand paresthesia    Cervical radiculopathy    Abnormal blood sugar    Allergic rhinitis    Allergic reaction    Subclinical hypothyroidism    Pacemaker    Vitamin D deficiency    Hand pain, right    Herpes zoster without complication    Status post abdominal hysterectomy    Class 2 obesity due to excess calories without serious comorbidity with body mass index (BMI) of 37 0 to 37 9 in adult    Chronic right shoulder pain       Objective   /78 (BP Location: Left arm, Patient Position: Sitting, Cuff Size: Large)   Pulse 65   Temp 97 7 °F (36 5 °C) (Temporal)   Resp 16   Ht 5' 6 93" (1 7 m)   Wt 109 kg (241 lb)   LMP  (LMP Unknown) SpO2 98%   BMI 37 83 kg/m²     Physical Exam  Cardiovascular:      Rate and Rhythm: Normal rate and regular rhythm  Heart sounds: Normal heart sounds  Comments: Carotids: no bruits  Ext: no edema  Pulmonary:      Effort: Pulmonary effort is normal  No respiratory distress  Breath sounds: No wheezing or rales  Psychiatric:         Behavior: Behavior normal          Thought Content:  Thought content normal          Pertinent Laboratory/Diagnostic Studies:  Labs from July 8th reviewed    Current Medications     Current Outpatient Medications:     aspirin 81 MG tablet, Take 81 mg by mouth daily, Disp: , Rfl:     azelastine (ASTELIN) 0 1 % nasal spray, 2 sprays into each nostril daily Use in each nostril as directed, Disp: , Rfl:     EPINEPHrine (EPIPEN) 0 3 mg/0 3 mL SOAJ, Inject 0 3 mL (0 3 mg total) into a muscle as needed for anaphylaxis (twin pack), Disp: 2 each, Rfl: 0    FLUTICASONE PROPIONATE, NASAL, NA, 2 sprays into each nostril as needed  , Disp: , Rfl:     levothyroxine 50 mcg tablet, Take 1 tablet (50 mcg total) by mouth daily, Disp: 90 tablet, Rfl: 1    loratadine (CLARITIN) 10 mg tablet, Take 10 mg by mouth daily, Disp: , Rfl:     Cholecalciferol (Vitamin D3) 50 MCG (2000 UT) TABS, 2 tabs daily, Disp: 60 tablet, Rfl: 10    Omega-3 Fatty Acids (fish oil) 1,000 mg, Take 4 capsules (4,000 mg total) by mouth daily, Disp: 120 capsule, Rfl: 10    Red Yeast Rice 600 MG CAPS, Take 2 capsules (1,200 mg total) by mouth 2 (two) times a day, Disp: 120 capsule, Rfl: 10    Health Maintenance     Health Maintenance   Topic Date Due    Hepatitis C Screening  Never done    HIV Screening  Never done    BMI: Followup Plan  01/16/2021    PT PLAN OF CARE  02/17/2022    Breast Cancer Screening: Mammogram  08/09/2022    Annual Physical  08/25/2022    Depression Screening  01/19/2023    BMI: Adult  01/19/2023    Colorectal Cancer Screening  08/22/2023    DTaP,Tdap,and Td Vaccines (2 - Td or Tdap) 01/19/2032    Influenza Vaccine  Completed    COVID-19 Vaccine  Completed    Pneumococcal Vaccine: Pediatrics (0 to 5 Years) and At-Risk Patients (6 to 59 Years)  Aged Out    HIB Vaccine  Aged Out    Hepatitis B Vaccine  Aged Out    IPV Vaccine  Aged Out    Hepatitis A Vaccine  Aged Out    Meningococcal ACWY Vaccine  Aged Out    HPV Vaccine  Aged [employer] History   Administered Date(s) Administered    COVID-19 PFIZER VACCINE 0 3 ML IM 12/23/2020, 01/11/2021, 09/30/2021    INFLUENZA 10/01/2016, 10/02/2017, 10/30/2019, 10/20/2020, 09/23/2021    Influenza Quadrivalent, 6-35 Months IM 10/02/2017    Influenza, Quadrivalent (nasal) 10/01/2016    Tdap 01/19/2022    Zoster Vaccine Recombinant 07/17/2020, 10/19/2020       Chad Jacome DO  [facility]

## 2022-01-19 NOTE — ASSESSMENT & PLAN NOTE
Continue red yeast rice 2400 mg daily and Omega 3, 4000 mg daily  Continue low-fat diet exercise    Will continue to monitor

## 2022-01-19 NOTE — ASSESSMENT & PLAN NOTE
Patient has been complaining of right shoulder pain for approximately 2 years  No history of trauma  Recently seen by Orthopedics  Patient was sent to physical therapy  She states she thinks she is already getting some benefit from this

## 2022-01-19 NOTE — ASSESSMENT & PLAN NOTE
Status post pacemaker insertion due to syncopal episode/bradycardia 1998  Patient had battery generator replaced in 2007, and again in 2020   Continue follow-up with cardiology as directed

## 2022-01-25 ENCOUNTER — IN-CLINIC DEVICE VISIT (OUTPATIENT)
Dept: CARDIOLOGY CLINIC | Facility: CLINIC | Age: 62
End: 2022-01-25
Payer: COMMERCIAL

## 2022-01-25 DIAGNOSIS — Z95.0 CARDIAC PACEMAKER IN SITU: Primary | ICD-10-CM

## 2022-01-25 PROCEDURE — 93280 PM DEVICE PROGR EVAL DUAL: CPT | Performed by: INTERNAL MEDICINE

## 2022-01-25 NOTE — PROGRESS NOTES
Results for orders placed or performed in visit on 01/25/22   Cardiac EP device report    Narrative    MDT-DUAL CHAMBER PPM (AAI-DDD MODE) / NOT MRI CONDITIONAL  DEVICE INTERROGATED IN THE [employer] OFFICE: BATTERY VOLTAGE ADEQUATE-11 YRS  AP 34%  0%  ALL AVAILABLE LEAD PARAMETERS WITHIN NORMAL LIMITS  NO NEW SIGNIFICANT HIGH RATE EPISODES  NO PROGRAMMING CHANGES MADE TO DEVICE PARAMETERS  NORMAL DEVICE FUNCTION   NC         Current Outpatient Medications:     aspirin 81 MG tablet, Take 81 mg by mouth daily, Disp: , Rfl:     azelastine (ASTELIN) 0 1 % nasal spray, 2 sprays into each nostril daily Use in each nostril as directed, Disp: , Rfl:     Cholecalciferol (Vitamin D3) 50 MCG (2000 UT) TABS, 2 tabs daily, Disp: 60 tablet, Rfl: 10    EPINEPHrine (EPIPEN) 0 3 mg/0 3 mL SOAJ, Inject 0 3 mL (0 3 mg total) into a muscle as needed for anaphylaxis (twin pack), Disp: 2 each, Rfl: 0    FLUTICASONE PROPIONATE, NASAL, NA, 2 sprays into each nostril as needed  , Disp: , Rfl:     levothyroxine 50 mcg tablet, Take 1 tablet (50 mcg total) by mouth daily, Disp: 90 tablet, Rfl: 1    loratadine (CLARITIN) 10 mg tablet, Take 10 mg by mouth daily, Disp: , Rfl:     Omega-3 Fatty Acids (fish oil) 1,000 mg, Take 4 capsules (4,000 mg total) by mouth daily, Disp: 120 capsule, Rfl: 10    Red Yeast Rice 600 MG CAPS, Take 2 capsules (1,200 mg total) by mouth 2 (two) times a day, Disp: 120 capsule, Rfl: 10

## 2022-01-28 ENCOUNTER — APPOINTMENT (OUTPATIENT)
Dept: PHYSICAL THERAPY | Facility: MEDICAL CENTER | Age: 62
End: 2022-01-28
Payer: COMMERCIAL

## 2022-02-04 ENCOUNTER — OFFICE VISIT (OUTPATIENT)
Dept: PHYSICAL THERAPY | Facility: MEDICAL CENTER | Age: 62
End: 2022-02-04
Payer: COMMERCIAL

## 2022-02-04 DIAGNOSIS — M19.011 OSTEOARTHRITIS OF GLENOHUMERAL JOINT, RIGHT: Primary | ICD-10-CM

## 2022-02-04 PROCEDURE — 97110 THERAPEUTIC EXERCISES: CPT | Performed by: PHYSICAL THERAPIST

## 2022-02-04 NOTE — PROGRESS NOTES
Daily Note     Today's date: 2022  Patient name: Sadia Westfall  : 1960  MRN: [id number]  Referring provider: Manuel Harrison MD  Dx:   Encounter Diagnosis     ICD-10-CM    1  Osteoarthritis of glenohumeral joint, right  M19 011                   Subjective: Kimmie Bernie reports that she is doing well, gaining range behind her back      Objective: See treatment diary below      Assessment: Tolerated treatment well  Patient demonstrated fatigue post treatment and exhibited good technique with therapeutic exercises      Plan: Continue per plan of care        Precautions: None      Manuals             GHJ posterior glides/inferior glides  AF            Shoulder IR PROM AF                                       Neuro Re-Ed                                                                                                        Ther Ex             TB row and extension Red  3X10            Shoulder IR stretch 30 sec   X 5             Supine belly press 20 X 3sec             [place] IR iso walkouts 10#  10 laps                                                                 Ther Activity                                       Gait Training                                       Modalities

## 2022-02-11 ENCOUNTER — OFFICE VISIT (OUTPATIENT)
Dept: PHYSICAL THERAPY | Facility: MEDICAL CENTER | Age: 62
End: 2022-02-11
Payer: COMMERCIAL

## 2022-02-11 DIAGNOSIS — M19.011 OSTEOARTHRITIS OF GLENOHUMERAL JOINT, RIGHT: Primary | ICD-10-CM

## 2022-02-11 PROCEDURE — 97140 MANUAL THERAPY 1/> REGIONS: CPT | Performed by: PHYSICAL THERAPIST

## 2022-02-11 PROCEDURE — 97110 THERAPEUTIC EXERCISES: CPT | Performed by: PHYSICAL THERAPIST

## 2022-02-11 NOTE — PROGRESS NOTES
Daily Note     Today's date: 2022  Patient name: Shanna Nina  : 1960  MRN: [id number]  Referring provider: Daisy Dennis MD  Dx:   Encounter Diagnosis     ICD-10-CM    1  Osteoarthritis of glenohumeral joint, right  M19 011                   Subjective: Luz Marina Greene reports that she is doing well, gaining range behind her back, more strength as well       Objective: See treatment diary below       Assessment: Tolerated treatment well  Patient demonstrated fatigue post treatment and exhibited good technique with therapeutic exercises  Progress shoulder IR strength, will check in at 2 weeks       Plan: Continue per plan of care  Precautions: None      Manuals             GHJ posterior glides/inferior glides  AF            Shoulder IR PROM AF                                       Neuro Re-Ed                                                                                                        Ther Ex             TB row and extension Red  3X10            Shoulder IR stretch 30 sec   X 5             Supine belly press 20 X 3sec             [place] IR iso walkouts 10#  10 laps             Prone shoulder lift off 30                                                   Ther Activity                                       Gait Training                                       Modalities

## 2022-02-25 ENCOUNTER — OFFICE VISIT (OUTPATIENT)
Dept: PHYSICAL THERAPY | Facility: MEDICAL CENTER | Age: 62
End: 2022-02-25
Payer: COMMERCIAL

## 2022-02-25 DIAGNOSIS — M19.011 OSTEOARTHRITIS OF GLENOHUMERAL JOINT, RIGHT: Primary | ICD-10-CM

## 2022-02-25 PROCEDURE — 97140 MANUAL THERAPY 1/> REGIONS: CPT | Performed by: PHYSICAL THERAPIST

## 2022-02-25 PROCEDURE — 97110 THERAPEUTIC EXERCISES: CPT | Performed by: PHYSICAL THERAPIST

## 2022-02-25 NOTE — PROGRESS NOTES
Daily Note     Today's date: 2022  Patient name: Tony Schroeder  : 1960  MRN: [id number]  Referring provider: Robert Greene MD  Dx:   Encounter Diagnosis     ICD-10-CM    1  Osteoarthritis of glenohumeral joint, right  M19 011                   Subjective: Eric Cons reports that she is doing well currently, did have flare up of pain this weekend but was stripping hardwood floor by hand and lots of repetitive motion  Pain gone today       Objective: See treatment diary below       Assessment: Tolerated treatment well  Patient demonstrated fatigue post treatment and exhibited good technique with therapeutic exercises  Issued more aggressive ER in elevation for home, she will check in if needed when returning from vacation       Plan: Continue per plan of care  Precautions: None      Manuals             GHJ posterior glides/inferior glides  AF            Shoulder IR PROM AF                                       Neuro Re-Ed                                                                                                        Ther Ex             TB row and extension Red  3X10            Shoulder IR stretch 30 sec   X 5             Supine belly press 20 X 3sec             [place] IR iso walkouts 10#  10 laps             Prone shoulder lift off 30            ER vs wall in elevation  30                                      Ther Activity                                       Gait Training                                       Modalities

## 2022-04-16 ENCOUNTER — IMMUNIZATIONS (OUTPATIENT)
Dept: FAMILY MEDICINE CLINIC | Facility: HOSPITAL | Age: 62
End: 2022-04-16

## 2022-04-16 PROCEDURE — 91305 COVID-19 PFIZER VACC TRIS-SUCROSE GRAY CAP 0.3 ML: CPT

## 2022-04-16 PROCEDURE — 0054A COVID-19 PFIZER VACC TRIS-SUCROSE GRAY CAP 0.3 ML: CPT

## 2022-04-26 ENCOUNTER — REMOTE DEVICE CLINIC VISIT (OUTPATIENT)
Dept: CARDIOLOGY CLINIC | Facility: CLINIC | Age: 62
End: 2022-04-26
Payer: COMMERCIAL

## 2022-04-26 DIAGNOSIS — Z95.0 PRESENCE OF PERMANENT CARDIAC PACEMAKER: Primary | ICD-10-CM

## 2022-04-26 PROCEDURE — 93294 REM INTERROG EVL PM/LDLS PM: CPT | Performed by: INTERNAL MEDICINE

## 2022-04-26 PROCEDURE — 93296 REM INTERROG EVL PM/IDS: CPT | Performed by: INTERNAL MEDICINE

## 2022-04-26 NOTE — PROGRESS NOTES
MDT-DUAL CHAMBER PPM / NOT MRI CONDITIONAL   CARELINK TRANSMISSION:  BATTERY VOLTAGE ADEQUATE (10 0 YR )   AP 32 0%  1 1%   ALL LEAD PARAMETERS WITHIN NORMAL LIMITS   NO SIGNIFICANT HIGH RATE EPISODES   5 DEVICE CLASSIFIED AT/AF EPISODES WITH EGMS SHOWING PROBABLE MYOPOTENTIALS WHICH APPEAR ON BOTH CHANNELS, AND ARE INT   OVERSENSED ON THE ATRIAL CHANNEL   NO AF   149 RATE DROP EPISODES   NORMAL DEVICE FUNCTION   RG

## 2022-07-18 ENCOUNTER — APPOINTMENT (OUTPATIENT)
Dept: LAB | Facility: MEDICAL CENTER | Age: 62
End: 2022-07-18
Payer: COMMERCIAL

## 2022-07-18 DIAGNOSIS — E03.8 SUBCLINICAL HYPOTHYROIDISM: ICD-10-CM

## 2022-07-18 DIAGNOSIS — R73.09 ABNORMAL BLOOD SUGAR: ICD-10-CM

## 2022-07-18 DIAGNOSIS — Z13.0 SCREENING FOR DEFICIENCY ANEMIA: ICD-10-CM

## 2022-07-18 DIAGNOSIS — E78.2 MIXED HYPERLIPIDEMIA: ICD-10-CM

## 2022-07-18 LAB
ALBUMIN SERPL BCP-MCNC: 3.4 G/DL (ref 3.5–5)
ALP SERPL-CCNC: 77 U/L (ref 46–116)
ALT SERPL W P-5'-P-CCNC: 20 U/L (ref 12–78)
ANION GAP SERPL CALCULATED.3IONS-SCNC: 3 MMOL/L (ref 4–13)
AST SERPL W P-5'-P-CCNC: 20 U/L (ref 5–45)
BASOPHILS # BLD AUTO: 0.08 THOUSANDS/ΜL (ref 0–0.1)
BASOPHILS NFR BLD AUTO: 1 % (ref 0–1)
BILIRUB SERPL-MCNC: 0.54 MG/DL (ref 0.2–1)
BUN SERPL-MCNC: 21 MG/DL (ref 5–25)
CALCIUM ALBUM COR SERPL-MCNC: 9.5 MG/DL (ref 8.3–10.1)
CALCIUM SERPL-MCNC: 9 MG/DL (ref 8.3–10.1)
CHLORIDE SERPL-SCNC: 109 MMOL/L (ref 96–108)
CHOLEST SERPL-MCNC: 201 MG/DL
CO2 SERPL-SCNC: 27 MMOL/L (ref 21–32)
CREAT SERPL-MCNC: 1.1 MG/DL (ref 0.6–1.3)
EOSINOPHIL # BLD AUTO: 0.25 THOUSAND/ΜL (ref 0–0.61)
EOSINOPHIL NFR BLD AUTO: 4 % (ref 0–6)
ERYTHROCYTE [DISTWIDTH] IN BLOOD BY AUTOMATED COUNT: 13.4 % (ref 11.6–15.1)
EST. AVERAGE GLUCOSE BLD GHB EST-MCNC: 117 MG/DL
GFR SERPL CREATININE-BSD FRML MDRD: 54 ML/MIN/1.73SQ M
GLUCOSE P FAST SERPL-MCNC: 102 MG/DL (ref 65–99)
HBA1C MFR BLD: 5.7 %
HCT VFR BLD AUTO: 40.8 % (ref 34.8–46.1)
HDLC SERPL-MCNC: 52 MG/DL
HGB BLD-MCNC: 12.8 G/DL (ref 11.5–15.4)
IMM GRANULOCYTES # BLD AUTO: 0.02 THOUSAND/UL (ref 0–0.2)
IMM GRANULOCYTES NFR BLD AUTO: 0 % (ref 0–2)
LDLC SERPL CALC-MCNC: 129 MG/DL (ref 0–100)
LYMPHOCYTES # BLD AUTO: 2.2 THOUSANDS/ΜL (ref 0.6–4.47)
LYMPHOCYTES NFR BLD AUTO: 35 % (ref 14–44)
MCH RBC QN AUTO: 28.1 PG (ref 26.8–34.3)
MCHC RBC AUTO-ENTMCNC: 31.4 G/DL (ref 31.4–37.4)
MCV RBC AUTO: 90 FL (ref 82–98)
MONOCYTES # BLD AUTO: 0.47 THOUSAND/ΜL (ref 0.17–1.22)
MONOCYTES NFR BLD AUTO: 8 % (ref 4–12)
NEUTROPHILS # BLD AUTO: 3.25 THOUSANDS/ΜL (ref 1.85–7.62)
NEUTS SEG NFR BLD AUTO: 52 % (ref 43–75)
NRBC BLD AUTO-RTO: 0 /100 WBCS
PLATELET # BLD AUTO: 304 THOUSANDS/UL (ref 149–390)
PMV BLD AUTO: 10.5 FL (ref 8.9–12.7)
POTASSIUM SERPL-SCNC: 4.2 MMOL/L (ref 3.5–5.3)
PROT SERPL-MCNC: 7.2 G/DL (ref 6.4–8.4)
RBC # BLD AUTO: 4.55 MILLION/UL (ref 3.81–5.12)
SODIUM SERPL-SCNC: 139 MMOL/L (ref 135–147)
TRIGL SERPL-MCNC: 101 MG/DL
TSH SERPL DL<=0.05 MIU/L-ACNC: 3.98 UIU/ML (ref 0.45–4.5)
WBC # BLD AUTO: 6.27 THOUSAND/UL (ref 4.31–10.16)

## 2022-07-18 PROCEDURE — 84443 ASSAY THYROID STIM HORMONE: CPT

## 2022-07-18 PROCEDURE — 83036 HEMOGLOBIN GLYCOSYLATED A1C: CPT

## 2022-07-18 PROCEDURE — 36415 COLL VENOUS BLD VENIPUNCTURE: CPT

## 2022-07-18 PROCEDURE — 80053 COMPREHEN METABOLIC PANEL: CPT

## 2022-07-18 PROCEDURE — 80061 LIPID PANEL: CPT

## 2022-07-18 PROCEDURE — 85025 COMPLETE CBC W/AUTO DIFF WBC: CPT

## 2022-07-20 ENCOUNTER — OFFICE VISIT (OUTPATIENT)
Dept: FAMILY MEDICINE CLINIC | Facility: CLINIC | Age: 62
End: 2022-07-20
Payer: COMMERCIAL

## 2022-07-20 VITALS
DIASTOLIC BLOOD PRESSURE: 78 MMHG | RESPIRATION RATE: 16 BRPM | SYSTOLIC BLOOD PRESSURE: 124 MMHG | HEART RATE: 70 BPM | BODY MASS INDEX: 37.67 KG/M2 | WEIGHT: 240 LBS | TEMPERATURE: 97.7 F | HEIGHT: 67 IN | OXYGEN SATURATION: 98 %

## 2022-07-20 DIAGNOSIS — R00.1 SINUS BRADYCARDIA: ICD-10-CM

## 2022-07-20 DIAGNOSIS — R20.0 NUMBNESS AND TINGLING OF RIGHT THUMB: ICD-10-CM

## 2022-07-20 DIAGNOSIS — E03.8 SUBCLINICAL HYPOTHYROIDISM: ICD-10-CM

## 2022-07-20 DIAGNOSIS — R20.2 NUMBNESS AND TINGLING OF RIGHT THUMB: ICD-10-CM

## 2022-07-20 DIAGNOSIS — E66.09 CLASS 2 OBESITY DUE TO EXCESS CALORIES WITHOUT SERIOUS COMORBIDITY WITH BODY MASS INDEX (BMI) OF 37.0 TO 37.9 IN ADULT: ICD-10-CM

## 2022-07-20 DIAGNOSIS — E78.2 MIXED HYPERLIPIDEMIA: ICD-10-CM

## 2022-07-20 DIAGNOSIS — R73.09 ABNORMAL BLOOD SUGAR: ICD-10-CM

## 2022-07-20 DIAGNOSIS — J30.1 ALLERGIC RHINITIS DUE TO POLLEN, UNSPECIFIED SEASONALITY: Primary | ICD-10-CM

## 2022-07-20 DIAGNOSIS — E55.9 VITAMIN D DEFICIENCY: ICD-10-CM

## 2022-07-20 PROCEDURE — 99214 OFFICE O/P EST MOD 30 MIN: CPT | Performed by: FAMILY MEDICINE

## 2022-07-20 RX ORDER — LEVOTHYROXINE SODIUM 0.05 MG/1
50 TABLET ORAL DAILY
Qty: 90 TABLET | Refills: 1 | Status: SHIPPED | OUTPATIENT
Start: 2022-07-20

## 2022-07-20 RX ORDER — AZELASTINE 1 MG/ML
2 SPRAY, METERED NASAL DAILY
Qty: 1 ML | Refills: 2 | Status: SHIPPED | OUTPATIENT
Start: 2022-07-20

## 2022-07-20 NOTE — PROGRESS NOTES
[facility]      NAME: Kolby Sparks  AGE: 63 y o  SEX: female  : 1960   MRN: [id number]    DATE: 2022  TIME: 8:36 AM    Assessment and Plan     Problem List Items Addressed This Visit     Mixed hyperlipidemia     Cholesterol still mildly elevated at 201,   HDL remains good at 53  Patient still taking red yeast rice and Omega 3  Continue to work on diet exercise  Will continue to monitor          Sinus bradycardia     Status post pacemaker insertion due to syncopal episode/bradycardia   Patient has had battery generator replaced twice ( and )  Continue follow-up with cardiology as directed         Numbness and tingling of right thumb     She has been having intermittent numbness in her right thumb for the past 2 months  She does have some intermittent neck pain  Recommend continued watchful observation  If symptoms persist, will sent for EMG right upper extremity         Abnormal blood sugar     A1c remains stable at 5 7%  Continue reduced carb diet, exercise, and weight loss         Allergic rhinitis - Primary     Doing well on OTC meds along with Astelin nasal spray  Relevant Medications    azelastine (ASTELIN) 0 1 % nasal spray    Subclinical hypothyroidism     TSH normal   Will continue to monitor         Relevant Medications    levothyroxine 50 mcg tablet    Vitamin D deficiency     Continue OTC supplement         Class 2 obesity due to excess calories without serious comorbidity with body mass index (BMI) of 37 0 to 37 9 in adult     Current weight 240, BMI 37 67 (was 37 83, previously 39 63)  Patient continues to slowly lose weight  She still walks daily  Continue diet exercise    Will continue to monitor             Patient COVID vaccination  Patient Shingrix  Tetanus     Current with mammography and colonoscopy    Return to office in: 6 mos, yearly labs 2023    Chief Complaint     Chief Complaint   Patient presents with    Follow-up 6 months       History of Present Illness     Patient presents recheck chronic medical problems today  She has been having intermittent numbness in her right thumb for the past 2 months  She does have some intermittent neck pain  Otherwise doing well  She had labs done on July 18th      The following portions of the patient's history were reviewed and updated as appropriate: allergies, current medications, past family history, past medical history, past social history, past surgical history and problem list     Review of Systems   Review of Systems   Respiratory: Negative  Cardiovascular: Negative  Gastrointestinal: Negative  Genitourinary: Negative  Neurological: Positive for numbness  Active Problem List     Patient Active Problem List   Diagnosis    Mixed hyperlipidemia    Sinus bradycardia    Numbness and tingling of right thumb    Cervical radiculopathy    Abnormal blood sugar    Allergic rhinitis    Allergic reaction    Subclinical hypothyroidism    Pacemaker    Vitamin D deficiency    Hand pain, right    Herpes zoster without complication    Status post abdominal hysterectomy    Class 2 obesity due to excess calories without serious comorbidity with body mass index (BMI) of 37 0 to 37 9 in adult    Chronic right shoulder pain       Objective   /78 (BP Location: Left arm, Patient Position: Sitting, Cuff Size: Large)   Pulse 70   Temp 97 7 °F (36 5 °C) (Temporal)   Resp 16   Ht 5' 6 93" (1 7 m)   Wt 109 kg (240 lb)   LMP  (LMP Unknown)   SpO2 98%   BMI 37 67 kg/m²     Physical Exam  Cardiovascular:      Rate and Rhythm: Normal rate and regular rhythm  Heart sounds: Normal heart sounds  Comments: Carotids: no bruits  Ext: no edema  Pulmonary:      Effort: Pulmonary effort is normal  No respiratory distress  Breath sounds: No wheezing or rales  Psychiatric:         Behavior: Behavior normal          Thought Content:  Thought content normal  Pertinent Laboratory/Diagnostic Studies:  Labs July 18th    Current Medications     Current Outpatient Medications:     aspirin 81 MG tablet, Take 81 mg by mouth daily, Disp: , Rfl:     azelastine (ASTELIN) 0 1 % nasal spray, 2 sprays into each nostril daily Use in each nostril as directed, Disp: 1 mL, Rfl: 2    Cholecalciferol (Vitamin D3) 50 MCG (2000 UT) TABS, 2 tabs daily, Disp: 60 tablet, Rfl: 10    EPINEPHrine (EPIPEN) 0 3 mg/0 3 mL SOAJ, Inject 0 3 mL (0 3 mg total) into a muscle as needed for anaphylaxis (twin pack), Disp: 2 each, Rfl: 0    FLUTICASONE PROPIONATE, NASAL, NA, 2 sprays into each nostril as needed  , Disp: , Rfl:     levothyroxine 50 mcg tablet, Take 1 tablet (50 mcg total) by mouth daily, Disp: 90 tablet, Rfl: 1    loratadine (CLARITIN) 10 mg tablet, Take 10 mg by mouth daily, Disp: , Rfl:     Omega-3 Fatty Acids (fish oil) 1,000 mg, Take 4 capsules (4,000 mg total) by mouth daily, Disp: 120 capsule, Rfl: 10    Red Yeast Rice 600 MG CAPS, Take 2 capsules (1,200 mg total) by mouth 2 (two) times a day, Disp: 120 capsule, Rfl: 10    Health Maintenance     Health Maintenance   Topic Date Due    Hepatitis C Screening  Never done    HIV Screening  Never done    BMI: Followup Plan  01/16/2021    PT PLAN OF CARE  02/17/2022    Breast Cancer Screening: Mammogram  08/09/2022    Annual Physical  08/25/2022    Influenza Vaccine (1) 09/01/2022    Depression Screening  01/19/2023    BMI: Adult  07/20/2023    Colorectal Cancer Screening  08/22/2023    DTaP,Tdap,and Td Vaccines (2 - Td or Tdap) 01/19/2032    COVID-19 Vaccine  Completed    Pneumococcal Vaccine: Pediatrics (0 to 5 Years) and At-Risk Patients (6 to 59 Years)  Aged Out    HIB Vaccine  Aged Out    Hepatitis B Vaccine  Aged Out    IPV Vaccine  Aged Out    Hepatitis A Vaccine  Aged Out    Meningococcal ACWY Vaccine  Aged Out    HPV Vaccine  Aged [employer] History   Administered Date(s) Administered   Yaquelin Page COVID-19 PFIZER VACCINE 0 3 ML IM 12/23/2020, 01/11/2021, 09/30/2021    COVID-19 Pfizer vac (Alfredo-sucrose, gray cap) 12 yr+ IM 04/16/2022    INFLUENZA 10/01/2016, 10/02/2017, 10/30/2019, 10/20/2020, 09/23/2021    Influenza Quadrivalent, 6-35 Months IM 10/02/2017    Influenza, Quadrivalent (nasal) 10/01/2016    Tdap 01/19/2022    Zoster Vaccine Recombinant 07/17/2020, 10/19/2020       Maris Victoria DO  [facility]

## 2022-07-20 NOTE — ASSESSMENT & PLAN NOTE
Cholesterol still mildly elevated at 201,   HDL remains good at 53  Patient still taking red yeast rice and Omega 3  Continue to work on diet exercise    Will continue to monitor

## 2022-07-20 NOTE — ASSESSMENT & PLAN NOTE
Status post pacemaker insertion due to syncopal episode/bradycardia 1998  Patient has had battery generator replaced twice (2007 and 2020)    Continue follow-up with cardiology as directed

## 2022-07-20 NOTE — ASSESSMENT & PLAN NOTE
Current weight 240, BMI 37 67 (was 37 83, previously 39 63)  Patient continues to slowly lose weight  She still walks daily  Continue diet exercise    Will continue to monitor

## 2022-07-20 NOTE — ASSESSMENT & PLAN NOTE
She has been having intermittent numbness in her right thumb for the past 2 months  She does have some intermittent neck pain  Recommend continued watchful observation    If symptoms persist, will sent for EMG right upper extremity

## 2022-07-26 ENCOUNTER — REMOTE DEVICE CLINIC VISIT (OUTPATIENT)
Dept: CARDIOLOGY CLINIC | Facility: CLINIC | Age: 62
End: 2022-07-26
Payer: COMMERCIAL

## 2022-07-26 DIAGNOSIS — Z95.0 CARDIAC PACEMAKER IN SITU: Primary | ICD-10-CM

## 2022-07-26 PROCEDURE — 93296 REM INTERROG EVL PM/IDS: CPT | Performed by: INTERNAL MEDICINE

## 2022-07-26 PROCEDURE — 93294 REM INTERROG EVL PM/LDLS PM: CPT | Performed by: INTERNAL MEDICINE

## 2022-08-29 ENCOUNTER — HOSPITAL ENCOUNTER (OUTPATIENT)
Dept: MAMMOGRAPHY | Facility: MEDICAL CENTER | Age: 62
Discharge: HOME | End: 2022-08-29
Payer: COMMERCIAL

## 2022-08-29 VITALS — BODY MASS INDEX: 37.67 KG/M2 | HEIGHT: 67 IN | WEIGHT: 240 LBS

## 2022-08-29 DIAGNOSIS — Z12.31 ENCOUNTER FOR SCREENING MAMMOGRAM FOR MALIGNANT NEOPLASM OF BREAST: ICD-10-CM

## 2022-08-29 PROCEDURE — 77067 SCR MAMMO BI INCL CAD: CPT

## 2022-08-29 PROCEDURE — 77063 BREAST TOMOSYNTHESIS BI: CPT

## 2022-09-19 ENCOUNTER — ANNUAL EXAM (OUTPATIENT)
Dept: GYNECOLOGY | Facility: CLINIC | Age: 62
End: 2022-09-19
Payer: COMMERCIAL

## 2022-09-19 VITALS
HEIGHT: 67 IN | WEIGHT: 247.6 LBS | BODY MASS INDEX: 38.86 KG/M2 | DIASTOLIC BLOOD PRESSURE: 82 MMHG | SYSTOLIC BLOOD PRESSURE: 140 MMHG

## 2022-09-19 DIAGNOSIS — Z01.419 WOMEN'S ANNUAL ROUTINE GYNECOLOGICAL EXAMINATION: ICD-10-CM

## 2022-09-19 DIAGNOSIS — E03.8 SUBCLINICAL HYPOTHYROIDISM: ICD-10-CM

## 2022-09-19 DIAGNOSIS — R00.1 SINUS BRADYCARDIA: ICD-10-CM

## 2022-09-19 DIAGNOSIS — D25.1 INTRAMURAL LEIOMYOMA OF UTERUS: ICD-10-CM

## 2022-09-19 DIAGNOSIS — Z90.710 STATUS POST LAPAROSCOPIC ASSISTED VAGINAL HYSTERECTOMY (LAVH): ICD-10-CM

## 2022-09-19 DIAGNOSIS — Z12.31 ENCOUNTER FOR SCREENING MAMMOGRAM FOR BREAST CANCER: ICD-10-CM

## 2022-09-19 DIAGNOSIS — M54.12 CERVICAL RADICULOPATHY: ICD-10-CM

## 2022-09-19 DIAGNOSIS — Z95.0 PACEMAKER: ICD-10-CM

## 2022-09-19 PROCEDURE — S0612 ANNUAL GYNECOLOGICAL EXAMINA: HCPCS | Performed by: OBSTETRICS & GYNECOLOGY

## 2022-09-19 NOTE — PROGRESS NOTES
Assessment     Annual well-woman exam    Status post LAVH    History of uterine fibroids, abnormal uterine bleeding and pelvic pain    History of sinus bradycardia    Pacemaker in place    Hypothyroidism     Obesity         Plan    Screening mammogram ordered   Colon cancer screening    Pap smear no longer indicated since hysterectomy    History of colonoscopy, repeat 10 years    All questions answered  Subjective  Here for annual exam     Yareli Winston is a 63 y o  female who presents for annual exam  Periods are absent patient is menopausal since her hysterectomy  Denies hot flashes or night sweats or vaginal dryness  The patient reports that there is not domestic violence in her life  Current contraception: status post hysterectomy  History of abnormal Pap smear: no  Family history of uterine or ovarian cancer: no  Regular self breast exam: yes  History of abnormal mammogram: yes - no  Family history of breast cancer: no  History of abnormal lipids: no  Menstrual History:  OB History        5    Para   4    Term   3       1    AB   1    Living   3       SAB   1    IAB        Ectopic        Multiple        Live Births                    Menarche age: 17  No LMP recorded (lmp unknown)  Patient has had a hysterectomy  Review of Systems  Pertinent items are noted in HPI        Objective  No acute distress   /82 (BP Location: Right arm, Patient Position: Sitting, Cuff Size: Standard)   Ht 5' 7" (1 702 m)   Wt 112 kg (247 lb 9 6 oz)   LMP  (LMP Unknown)   BMI 38 78 kg/m²     General:   alert and oriented, in no acute distress, alert, appears stated age and cooperative   Heart: regular rate and rhythm, S1, S2 normal, no murmur, click, rub or gallop   Lungs: clear to auscultation bilaterally   Abdomen: soft, non-tender, without masses or organomegaly   Vulva: normal, Bartholin's, Urethra, [place]'s normal, female escutcheon   Vagina: normal mucosa, normal discharge, no palpable nodules Cervix: surgically absent   Uterus: surgically absent   Adnexa: normal adnexa   Bilateral breast exam in the sitting and supine position with chaperone present, no visible or palpable breast lesions identified  No breast masses noted  No supraclavicular or axillary lymphadenopathy noted  No nipple discharge  Reviewed self-breast exam techniques     Rectal exam,  deferred

## 2022-11-01 ENCOUNTER — REMOTE DEVICE CLINIC VISIT (OUTPATIENT)
Dept: CARDIOLOGY CLINIC | Facility: CLINIC | Age: 62
End: 2022-11-01

## 2022-11-01 DIAGNOSIS — Z95.0 CARDIAC PACEMAKER IN SITU: Primary | ICD-10-CM

## 2022-11-01 NOTE — PROGRESS NOTES
MDT-DUAL CHAMBER PPM (AAI-DDD MODE) / NOT MRI CONDITIONAL   CARELINK TRANSMISSION: BATTERY STATUS "10 YRS " AP   ALL AVAILABLE LEAD PARAMETERS WITHIN NORMAL LIMITS  1 AT/AF NOTED; 0% BURDEN; NO AF-AVAIL EGRAM (8/2022) SHOWING NOISE/MSG LEFT FOR PT TO ASSESS  NORMAL DEVICE FUNCTION   NC

## 2023-01-23 ENCOUNTER — APPOINTMENT (OUTPATIENT)
Dept: RADIOLOGY | Facility: MEDICAL CENTER | Age: 63
End: 2023-01-23

## 2023-01-23 ENCOUNTER — OFFICE VISIT (OUTPATIENT)
Dept: FAMILY MEDICINE CLINIC | Facility: CLINIC | Age: 63
End: 2023-01-23

## 2023-01-23 VITALS
DIASTOLIC BLOOD PRESSURE: 80 MMHG | HEART RATE: 77 BPM | TEMPERATURE: 98.1 F | WEIGHT: 259 LBS | BODY MASS INDEX: 40.65 KG/M2 | OXYGEN SATURATION: 99 % | RESPIRATION RATE: 16 BRPM | SYSTOLIC BLOOD PRESSURE: 130 MMHG | HEIGHT: 67 IN

## 2023-01-23 DIAGNOSIS — E78.2 MIXED HYPERLIPIDEMIA: ICD-10-CM

## 2023-01-23 DIAGNOSIS — E66.09 CLASS 2 OBESITY DUE TO EXCESS CALORIES WITHOUT SERIOUS COMORBIDITY WITH BODY MASS INDEX (BMI) OF 37.0 TO 37.9 IN ADULT: Primary | ICD-10-CM

## 2023-01-23 DIAGNOSIS — R73.09 ABNORMAL BLOOD SUGAR: ICD-10-CM

## 2023-01-23 DIAGNOSIS — R00.1 SINUS BRADYCARDIA: ICD-10-CM

## 2023-01-23 DIAGNOSIS — Z11.59 NEED FOR HEPATITIS C SCREENING TEST: ICD-10-CM

## 2023-01-23 DIAGNOSIS — J30.1 ALLERGIC RHINITIS DUE TO POLLEN, UNSPECIFIED SEASONALITY: ICD-10-CM

## 2023-01-23 DIAGNOSIS — M79.642 LEFT HAND PAIN: ICD-10-CM

## 2023-01-23 DIAGNOSIS — E03.8 SUBCLINICAL HYPOTHYROIDISM: ICD-10-CM

## 2023-01-23 DIAGNOSIS — Z13.0 SCREENING FOR DEFICIENCY ANEMIA: ICD-10-CM

## 2023-01-23 DIAGNOSIS — E55.9 VITAMIN D DEFICIENCY: ICD-10-CM

## 2023-01-23 RX ORDER — MULTIVIT-MIN/IRON/FOLIC ACID/K 18-600-40
CAPSULE ORAL
Qty: 60 CAPSULE | Refills: 10 | Status: SHIPPED | OUTPATIENT
Start: 2023-01-23

## 2023-01-23 RX ORDER — CHOLECALCIFEROL (VITAMIN D3) 125 MCG
CAPSULE ORAL
Qty: 60 TABLET | Refills: 10 | Status: SHIPPED | OUTPATIENT
Start: 2023-01-23

## 2023-01-23 RX ORDER — LEVOTHYROXINE SODIUM 0.05 MG/1
50 TABLET ORAL DAILY
Qty: 90 TABLET | Refills: 1 | Status: SHIPPED | OUTPATIENT
Start: 2023-01-23

## 2023-01-23 RX ORDER — CHLORAL HYDRATE 500 MG
4000 CAPSULE ORAL DAILY
Qty: 120 CAPSULE | Refills: 10 | Status: SHIPPED | OUTPATIENT
Start: 2023-01-23

## 2023-01-23 RX ORDER — AMPICILLIN TRIHYDRATE 250 MG
1200 CAPSULE ORAL 2 TIMES DAILY
Qty: 120 CAPSULE | Refills: 10 | Status: SHIPPED | OUTPATIENT
Start: 2023-01-23

## 2023-01-23 NOTE — ASSESSMENT & PLAN NOTE
Doing well on red yeast rice 2400 mg daily and omega-3, 4 capsules daily  Continue diet and exercise    We will continue to monitor

## 2023-01-23 NOTE — ASSESSMENT & PLAN NOTE
Patient complaining of pain in second digit of left hand for the past 2 months since splitting wood  She is unable to fully flex this digit  She also mentions that she gets symmetric joint pain in her fingers and wrists  We will check x-rays of left hand  If symptoms persist, will also check inflammatory lab panel
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
## 2023-01-23 NOTE — ASSESSMENT & PLAN NOTE
Status post pacemaker implantation due to syncopal episode/bradycardia 1998  Patient subsequently had battery generator replaced in 2007, and again in 2020  Patient doing well    Continue follow-up with cardiology as directed
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
## 2023-01-31 ENCOUNTER — IN-CLINIC DEVICE VISIT (OUTPATIENT)
Dept: CARDIOLOGY CLINIC | Facility: CLINIC | Age: 63
End: 2023-01-31

## 2023-01-31 DIAGNOSIS — Z95.0 PRESENCE OF PERMANENT CARDIAC PACEMAKER: Primary | ICD-10-CM

## 2023-01-31 NOTE — PROGRESS NOTES
Results for orders placed or performed in visit on 01/31/23   Cardiac EP device report    Narrative    MDT-DUAL CHAMBER PPM (AAI-DDD MODE) / NOT MRI CONDITIONAL  DEVICE INTERROGATED IN THE [facility] OFFICE  BATTERY VOLTAGE ADEQUATE (9 8 YRS)  AP 32 8%  1 3% ALL LEAD PARAMETERS WITHIN NORMAL LIMITS  ONE VT-NS EPISODE WITH EGM SHOWING PAT (29 BEATS  BPM)  2 SVT-ST EPISODES ON SAME DAY  BPM, MAX DURATION 2 MINS  5 DEVICE CLASSIFIED AT/AF EPISODES WITH 4 ON SAME DAY AND LONGEST AT 35 SECS; AF VS NOISE  AT/AF BURDEN <0 1%  TAKES ASA  NO PROGRAMMING CHANGES MADE TO DEVICE PARAMETERS  NORMAL DEVICE FUNCTION   AM/GV

## 2023-03-08 ENCOUNTER — OFFICE VISIT (OUTPATIENT)
Dept: PHYSICAL THERAPY | Facility: MEDICAL CENTER | Age: 63
End: 2023-03-08

## 2023-03-08 DIAGNOSIS — S46.212A BICEPS STRAIN, LEFT, INITIAL ENCOUNTER: Primary | ICD-10-CM

## 2023-03-08 NOTE — PROGRESS NOTES
PT Evaluation     Today's date: 3/8/2023  Patient name: Kenrick Cedillo  : 1960  MRN: [id number]  Referring provider: Marifer Valerio PT  Dx:   Encounter Diagnosis     ICD-10-CM    1  Biceps strain, left, initial encounter  S46 [id number]                      Assessment  Assessment details: Kenrick Cedillo is a 63 y o  female was evaluated on 3/8/2023  for Biceps strain, left, initial encounter  (primary encounter diagnosis)  Kenrick Cedillo has the above listed impairments resulting in functional deficits and negative impact to quality of life  Patient is appropriate for skilled PT intervention to promote maximal return to function and patient specific goals  Patient agrees with outlined treatment plan and all questions were answered to their satisfaction  Impairments: abnormal muscle firing, abnormal muscle tone, abnormal or restricted ROM, impaired physical strength, lacks appropriate home exercise program and pain with function  Understanding of Dx/Px/POC: good   Prognosis: good    Goals  Patient will successfully transition to home exercise program   Patient will be able to manage symptoms independently      Lian Gentleman will report no limitation in sleeping due to bicep pain  Lian Gentleman will report no difficulty with normal daily activity     Plan  Patient would benefit from: skilled PT  Referral necessary: No  Planned modality interventions: thermotherapy: hydrocollator packs  Planned therapy interventions: home exercise program, manual therapy, neuromuscular re-education, patient education, functional ROM exercises, strengthening, stretching, joint mobilization, graded activity, graded exercise, therapeutic exercise, body mechanics training, motor coordination training and activity modification  Frequency: 1x week  Duration in weeks: 4  Treatment plan discussed with: patient        Subjective Evaluation    History of Present Illness  Mechanism of injury: Kenrick Cedillo is a 63 y o  female presenting to therapy with complaints of L bicep pain  She has a history of L RTC repair back in 2018  No issues since that surgery  Recently she was doing some house renovations and was prying up tile and backer board  She noted some left bicep pain initially, then it went away after a few days but then returned after another course of ripping up tile  She now notes more consistent pain and difficulty sleeping due to pain  Pain is located mostly in mid portion of bicep, deep  Denies any numbness or tingling, no swelling   Pain  Current pain rating: 3  At best pain ratin  At worst pain ratin  Quality: dull ache, tight and discomfort    Patient Goals  Patient goals for therapy: decreased pain, increased motion, return to sport/leisure activities, independence with ADLs/IADLs and increased strength          Objective     Palpation   Left   No palpable tenderness to the biceps  Muscle spasm in the biceps  Tenderness of the biceps  Additional Palpation Details  Brachialis TTP     Tenderness     Left Shoulder   No tenderness     Right Shoulder  No tenderness     Cervical/Thoracic Screen   Cervical range of motion within normal limits  Thoracic range of motion within normal limits    Neurological Testing     Sensation     Shoulder   Left Shoulder   Intact: light touch    Right Shoulder   Intact: light touch    Active Range of Motion   Left Shoulder   Normal active range of motion    Right Shoulder   Normal active range of motion    Passive Range of Motion   Left Shoulder   Normal passive range of motion    Right Shoulder   Normal passive range of motion    Strength/Myotome Testing     Left Shoulder   Normal muscle strength    Right Shoulder   Normal muscle strength    Tests     Right Shoulder   Negative drop arm, empty can, external rotation lag sign, Hawkin's and bicep load test positive                Precautions: None      Manuals 3/8            Bicep/Brachialis Release AF                                                    Neuro Re-Ed                                                                                                        Ther Ex             Bicep stretch 30 sec X 3                                                                                                       Ther Activity                                       Gait Training                                       Modalities

## 2023-05-04 ENCOUNTER — REMOTE DEVICE CLINIC VISIT (OUTPATIENT)
Dept: CARDIOLOGY CLINIC | Facility: CLINIC | Age: 63
End: 2023-05-04

## 2023-05-04 DIAGNOSIS — Z95.0 PRESENCE OF PERMANENT CARDIAC PACEMAKER: Primary | ICD-10-CM

## 2023-05-04 NOTE — PROGRESS NOTES
MDT-DUAL CHAMBER PPM (AAI-DDD MODE) / NOT MRI CONDITIONAL   CARELINK TRANSMISSION: BATTERY VOLTAGE ADEQUATE (9 6 YRS)  AP 19 6%  <0 1% (AAI-DDD 50PPM);  ALL AVAILABLE LEAD PARAMETERS WITHIN NORMAL LIMITS  1 DEVICE CLASSIFIED VT-NS EPISODE, 1 FAST A&V EPISODE, 1 SVT-ST WITH ALL EGM'S SHOWING SVT/PAT, QRS FARFIELD OVERSENSING ON ATRIAL CHANNEL  MAX EPISODE DURATION 01:10 MINS @ -162 BPM; 164 RATE DROP EPISODES (NO EGM STORAGE); PATIENT ON ASA 81 MG   PACEMAKER FUNCTIONING APPROPRIATELY   ES

## 2023-07-19 ENCOUNTER — APPOINTMENT (OUTPATIENT)
Dept: LAB | Facility: MEDICAL CENTER | Age: 63
End: 2023-07-19

## 2023-07-19 ENCOUNTER — APPOINTMENT (OUTPATIENT)
Dept: LAB | Facility: MEDICAL CENTER | Age: 63
End: 2023-07-19
Payer: COMMERCIAL

## 2023-07-19 DIAGNOSIS — Z00.8 HEALTH EXAMINATION IN POPULATION SURVEY: ICD-10-CM

## 2023-07-19 DIAGNOSIS — Z13.0 SCREENING FOR DEFICIENCY ANEMIA: ICD-10-CM

## 2023-07-19 DIAGNOSIS — R73.09 ABNORMAL BLOOD SUGAR: ICD-10-CM

## 2023-07-19 DIAGNOSIS — E78.2 MIXED HYPERLIPIDEMIA: ICD-10-CM

## 2023-07-19 DIAGNOSIS — Z11.59 NEED FOR HEPATITIS C SCREENING TEST: ICD-10-CM

## 2023-07-19 DIAGNOSIS — E03.8 SUBCLINICAL HYPOTHYROIDISM: ICD-10-CM

## 2023-07-19 LAB
ALBUMIN SERPL BCP-MCNC: 3.3 G/DL (ref 3.5–5)
ALP SERPL-CCNC: 89 U/L (ref 46–116)
ALT SERPL W P-5'-P-CCNC: 22 U/L (ref 12–78)
ANION GAP SERPL CALCULATED.3IONS-SCNC: 2 MMOL/L
AST SERPL W P-5'-P-CCNC: 15 U/L (ref 5–45)
BASOPHILS # BLD AUTO: 0.07 THOUSANDS/ÂΜL (ref 0–0.1)
BASOPHILS NFR BLD AUTO: 1 % (ref 0–1)
BILIRUB SERPL-MCNC: 0.52 MG/DL (ref 0.2–1)
BUN SERPL-MCNC: 17 MG/DL (ref 5–25)
CALCIUM ALBUM COR SERPL-MCNC: 9.6 MG/DL (ref 8.3–10.1)
CALCIUM SERPL-MCNC: 9 MG/DL (ref 8.3–10.1)
CHLORIDE SERPL-SCNC: 109 MMOL/L (ref 96–108)
CHOLEST SERPL-MCNC: 192 MG/DL
CO2 SERPL-SCNC: 28 MMOL/L (ref 21–32)
CREAT SERPL-MCNC: 1.17 MG/DL (ref 0.6–1.3)
EOSINOPHIL # BLD AUTO: 0.26 THOUSAND/ÂΜL (ref 0–0.61)
EOSINOPHIL NFR BLD AUTO: 4 % (ref 0–6)
ERYTHROCYTE [DISTWIDTH] IN BLOOD BY AUTOMATED COUNT: 12.7 % (ref 11.6–15.1)
EST. AVERAGE GLUCOSE BLD GHB EST-MCNC: 108 MG/DL
GFR SERPL CREATININE-BSD FRML MDRD: 50 ML/MIN/1.73SQ M
GLUCOSE P FAST SERPL-MCNC: 109 MG/DL (ref 65–99)
HBA1C MFR BLD: 5.4 %
HCT VFR BLD AUTO: 38.3 % (ref 34.8–46.1)
HCV AB SER QL: NORMAL
HDLC SERPL-MCNC: 53 MG/DL
HGB BLD-MCNC: 12.2 G/DL (ref 11.5–15.4)
IMM GRANULOCYTES # BLD AUTO: 0.02 THOUSAND/UL (ref 0–0.2)
IMM GRANULOCYTES NFR BLD AUTO: 0 % (ref 0–2)
LDLC SERPL CALC-MCNC: 125 MG/DL (ref 0–100)
LYMPHOCYTES # BLD AUTO: 1.9 THOUSANDS/ÂΜL (ref 0.6–4.47)
LYMPHOCYTES NFR BLD AUTO: 32 % (ref 14–44)
MCH RBC QN AUTO: 28.3 PG (ref 26.8–34.3)
MCHC RBC AUTO-ENTMCNC: 31.9 G/DL (ref 31.4–37.4)
MCV RBC AUTO: 89 FL (ref 82–98)
MONOCYTES # BLD AUTO: 0.44 THOUSAND/ÂΜL (ref 0.17–1.22)
MONOCYTES NFR BLD AUTO: 7 % (ref 4–12)
NEUTROPHILS # BLD AUTO: 3.28 THOUSANDS/ÂΜL (ref 1.85–7.62)
NEUTS SEG NFR BLD AUTO: 56 % (ref 43–75)
NRBC BLD AUTO-RTO: 0 /100 WBCS
PLATELET # BLD AUTO: 332 THOUSANDS/UL (ref 149–390)
PMV BLD AUTO: 10.2 FL (ref 8.9–12.7)
POTASSIUM SERPL-SCNC: 4.6 MMOL/L (ref 3.5–5.3)
PROT SERPL-MCNC: 7.3 G/DL (ref 6.4–8.4)
RBC # BLD AUTO: 4.31 MILLION/UL (ref 3.81–5.12)
SODIUM SERPL-SCNC: 139 MMOL/L (ref 135–147)
TRIGL SERPL-MCNC: 71 MG/DL
TSH SERPL DL<=0.05 MIU/L-ACNC: 2.35 UIU/ML (ref 0.45–4.5)
WBC # BLD AUTO: 5.97 THOUSAND/UL (ref 4.31–10.16)

## 2023-07-19 PROCEDURE — 85025 COMPLETE CBC W/AUTO DIFF WBC: CPT

## 2023-07-19 PROCEDURE — 84443 ASSAY THYROID STIM HORMONE: CPT

## 2023-07-19 PROCEDURE — 83036 HEMOGLOBIN GLYCOSYLATED A1C: CPT

## 2023-07-19 PROCEDURE — 80053 COMPREHEN METABOLIC PANEL: CPT

## 2023-07-19 PROCEDURE — 80061 LIPID PANEL: CPT

## 2023-07-19 PROCEDURE — 86803 HEPATITIS C AB TEST: CPT

## 2023-07-19 PROCEDURE — 36415 COLL VENOUS BLD VENIPUNCTURE: CPT

## 2023-07-26 ENCOUNTER — OFFICE VISIT (OUTPATIENT)
Dept: FAMILY MEDICINE CLINIC | Facility: CLINIC | Age: 63
End: 2023-07-26
Payer: COMMERCIAL

## 2023-07-26 VITALS
TEMPERATURE: 97.7 F | SYSTOLIC BLOOD PRESSURE: 118 MMHG | HEIGHT: 67 IN | BODY MASS INDEX: 40.65 KG/M2 | RESPIRATION RATE: 16 BRPM | WEIGHT: 259 LBS | OXYGEN SATURATION: 98 % | HEART RATE: 80 BPM | DIASTOLIC BLOOD PRESSURE: 78 MMHG

## 2023-07-26 DIAGNOSIS — R73.09 ABNORMAL BLOOD SUGAR: ICD-10-CM

## 2023-07-26 DIAGNOSIS — R00.1 SINUS BRADYCARDIA: ICD-10-CM

## 2023-07-26 DIAGNOSIS — G43.109 OCULAR MIGRAINE: ICD-10-CM

## 2023-07-26 DIAGNOSIS — E03.8 SUBCLINICAL HYPOTHYROIDISM: ICD-10-CM

## 2023-07-26 DIAGNOSIS — E55.9 VITAMIN D DEFICIENCY: ICD-10-CM

## 2023-07-26 DIAGNOSIS — H53.9 VISUAL DISTURBANCES: ICD-10-CM

## 2023-07-26 DIAGNOSIS — Z12.11 SCREEN FOR COLON CANCER: ICD-10-CM

## 2023-07-26 DIAGNOSIS — E66.09 CLASS 2 OBESITY DUE TO EXCESS CALORIES WITHOUT SERIOUS COMORBIDITY WITH BODY MASS INDEX (BMI) OF 37.0 TO 37.9 IN ADULT: ICD-10-CM

## 2023-07-26 DIAGNOSIS — E78.2 MIXED HYPERLIPIDEMIA: ICD-10-CM

## 2023-07-26 DIAGNOSIS — Z00.00 WELL ADULT EXAM: Primary | ICD-10-CM

## 2023-07-26 DIAGNOSIS — N18.30 STAGE 3 CHRONIC KIDNEY DISEASE, UNSPECIFIED WHETHER STAGE 3A OR 3B CKD (HCC): ICD-10-CM

## 2023-07-26 PROCEDURE — 99396 PREV VISIT EST AGE 40-64: CPT | Performed by: FAMILY MEDICINE

## 2023-07-26 RX ORDER — LEVOTHYROXINE SODIUM 0.05 MG/1
50 TABLET ORAL DAILY
Qty: 90 TABLET | Refills: 1 | Status: SHIPPED | OUTPATIENT
Start: 2023-07-26

## 2023-07-26 NOTE — ASSESSMENT & PLAN NOTE
Lab Results   Component Value Date    EGFR 50 07/19/2023    EGFR 54 07/18/2022    EGFR 59 07/08/2021    CREATININE 1.17 07/19/2023    CREATININE 1.10 07/18/2022    CREATININE 1.04 07/08/2021   GFR 50. Blood pressure controlled. Patient is prediabetic, blood sugars have been stable.   We will continue to monitor

## 2023-07-26 NOTE — ASSESSMENT & PLAN NOTE
Patient has been complaining of ongoing flashing lights in her visual fields for the past 2 years. They sometimes occur daily. Sometimes every few days. They are brief in nature. She does have a history of headaches/migraines in the past, but recently not associated with headache. Otherwise feels well. Neurologic exam unremarkable. Patient has been evaluated by her eye care specialist.  We will send for CT head with and without contrast (patient cannot get MRI due to pacemaker).   We will call with results

## 2023-07-26 NOTE — ASSESSMENT & PLAN NOTE
Cholesterol 192, .   Reasonably controlled on red yeast rice 2400 mg daily and omega-3 4 capsules daily

## 2023-07-26 NOTE — PROGRESS NOTES
Name: Da Eason      : 1960      MRN: [id number]  Encounter Provider: Chaitanya Beltre DO  Encounter Date: 2023   Encounter department: [address] [place] [address]     1. Well adult exam    2. Class 2 obesity due to excess calories without serious comorbidity with body mass index (BMI) of 37.0 to 37.9 in adult  Assessment & Plan:  Weight 259, BMI 40.65. Continue to work on diet and exercise      3. Subclinical hypothyroidism  Assessment & Plan:  TSH normal.  We will continue to monitor    Orders:  -     levothyroxine 50 mcg tablet; Take 1 tablet (50 mcg total) by mouth daily    4. Mixed hyperlipidemia  Assessment & Plan:  Cholesterol 192, . Reasonably controlled on red yeast rice 2400 mg daily and omega-3 4 capsules daily      5. Sinus bradycardia  Assessment & Plan:  Status post pacemaker implantation due to syncopal episode/bradycardia in . Patient subsequently had battery generator replaced in , and again in . She has been doing well. Continue follow-up with cardiology as directed      6. Stage 3 chronic kidney disease, unspecified whether stage 3a or 3b CKD [facility])  Assessment & Plan:  Lab Results   Component Value Date    EGFR 50 2023    EGFR 54 2022    EGFR 59 2021    CREATININE 1.17 2023    CREATININE 1.10 2022    CREATININE 1.04 2021   GFR 50. Blood pressure controlled. Patient is prediabetic, blood sugars have been stable. We will continue to monitor      7. Abnormal blood sugar  Assessment & Plan:  A1c stable at 5.4%. Continue to work on diet exercise and weight loss      8. Vitamin D deficiency  Assessment & Plan:  Continue OTC vitamin D supplement      9. Ocular migraine  Assessment & Plan:  Patient has been complaining of ongoing flashing lights in her visual fields for the past 2 years. They sometimes occur daily. Sometimes every few days. They are brief in nature.   She does have a history of headaches/migraines in the past, but recently not associated with headache. Otherwise feels well. Neurologic exam unremarkable. Patient has been evaluated by her eye care specialist.  We will send for CT head with and without contrast (patient cannot get MRI due to pacemaker). We will call with results    Orders:  -     CT head w wo contrast; Future; Expected date: 07/26/2023    10. Screen for colon cancer  -     Ambulatory Referral to Gastroenterology; Future    11. Visual disturbances  -     CT head w wo contrast; Future; Expected date: 07/26/2023        Patient presents for 66year-old physical examination and med check appointment today. Patient has been experiencing ongoing flashing lights in her visual fields for the past 2 years. They sometimes occur daily. Sometimes every few days. They are brief. History of migraines in the past, but recently not associated with headaches. Otherwise feels well. Compliant with medications. States diet is healthy. She has started weight training. She is a non-smoker. Drinks minimal amount of alcohol (a few drinks per month maximum). She drinks approximately 1 diet soda daily. Her exam is unremarkable today. Recommend continue to work on healthy diet, exercise, and weight loss    Patient due for colonoscopy. Order given  Patient has mammogram scheduled    Last tetanus booster 2022  Patient had Shingrix vaccination    Labs from July 19 reviewed with patient  6 months, yearly labs July 2024         Subjective     Patient presents for 66year-old physical examination and med check appointment today. Patient has been experiencing ongoing flashing lights in her visual fields for the past 2 years. They sometimes occur daily. Sometimes every few days. They are brief. History of migraines in the past, but recently not associated with headaches. Otherwise feels well. Compliant with medications. States diet is healthy. She has started weight training.   She is a non-smoker. Drinks minimal amount of alcohol (a few drinks per month maximum). She drinks approximately 1 diet soda daily. Review of Systems   Eyes: Positive for visual disturbance. Respiratory: Negative. Cardiovascular: Negative. Gastrointestinal: Negative. Genitourinary: Negative. Neurological: Positive for headaches. Past Medical History:   Diagnosis Date   • Abnormal blood chemistry     last assessed: 2014   • Allergic reaction     last assessed: 2012   • Allergic rhinitis     last assessed: 2014   • Arthralgia of left shoulder region     last assessed: 12/3/2013   • Arthritis    • Dysautonomia ([address])    • Elevated blood pressure reading without diagnosis of hypertension     last assessed: 2016   • Endometriosis    • Familial combined hyperlipidemia     last assessed: 2014   • Female infertility    • Fibroid    • Functional murmur     description: soft systolic murmur - no valvular disease on 2D echo Last assessed: 2014   • Hypertension    • Obstruction of eustachian tube     unspecified laterality Last assessed: 3/17/2014   • Polycystic ovary syndrome    • PONV (postoperative nausea and vomiting)    • Subclinical hypothyroidism 2019   • Symptomatic bradycardia    • Syncope    • Wears glasses      Past Surgical History:   Procedure Laterality Date   • APPENDECTOMY     • CARDIAC CATHETERIZATION      Description: Normal EF, No obstructive coronary artery disease, systolic hypertension. Amberly Fitzgerald done at [facility] Resolved: 1998   • CARDIAC PACEMAKER PLACEMENT Left    •  SECTION     • COLONOSCOPY     • DEBRIDEMENT TENNIS ELBOW     • GANGLION CYST EXCISION Left     hand   • HYSTERECTOMY     • KNEE ARTHROSCOPY Right    • NM SURGICAL ARTHROSCOPY SHOULDER W/ROTATOR CUFF RPR Left 10/3/2016    Procedure: ARTHROSCOPY SHOULDER, ROTATOR CUFF REPAIR,SUBACROMIAL DECOMPRESSION ; MICROFRACTURE MIKAELA GLENOID HEAD;  Surgeon: Jorge Donovan MD;  Location: [facility];  Service: Orthopedics   • TONSILLECTOMY     • WISDOM TOOTH EXTRACTION       Family History   Problem Relation Age of Onset   • Breast cancer Mother 76   • Ovarian cancer Mother 80   • Multiple myeloma Mother    • Liver cancer Father 68   • No Known Problems Sister    • No Known Problems Sister    • No Known Problems Sister    • No Known Problems Sister    • No Known Problems Daughter    • No Known Problems Daughter    • Colon cancer Maternal Grandmother 67   • Thyroid cancer Maternal Grandfather 66   • No Known Problems Paternal Grandmother    • No Known Problems Paternal Grandfather    • No Known Problems Maternal Aunt    • No Known Problems Paternal Aunt    • Liver cancer Paternal Aunt 76   • Allergies Family         bronchitis   • Diabetes Family    • Seizures Family    • Heart disease Family    • Hypertension Family    • Skin cancer Family      Social History     Socioeconomic History   • Marital status: /Civil Union     Spouse name: None   • Number of children: 3   • Years of education: None   • Highest education level: None   Occupational History   • None   Tobacco Use   • Smoking status: Never   • Smokeless tobacco: Never   Vaping Use   • Vaping Use: Never used   Substance and Sexual Activity   • Alcohol use: Yes     Comment: rare   • Drug use: No   • Sexual activity: Yes     Birth control/protection: Female Sterilization   Other Topics Concern   • None   Social History Narrative    Living independently with spouse    Caffeine use      Social Determinants of Health     Financial Resource Strain: Not on file   Food Insecurity: Not on file   Transportation Needs: Not on file   Physical Activity: Not on file   Stress: Not on file   Social Connections: Not on file   Intimate Partner Violence: Not on file   Housing Stability: Not on file     Current Outpatient Medications on File Prior to Visit   Medication Sig   • aspirin 81 MG tablet Take 81 mg by mouth daily   • azelastine (ASTELIN) 0.1 % nasal spray 2 sprays into each nostril daily Use in each nostril as directed   • Cholecalciferol (Vitamin D3) 50 MCG (2000 UT) TABS 2 tabs daily   • EPINEPHrine (EPIPEN) 0.3 mg/0.3 mL SOAJ Inject 0.3 mL (0.3 mg total) into a muscle as needed for anaphylaxis (twin pack)   • FLUTICASONE PROPIONATE, NASAL, NA 2 sprays into each nostril as needed     • loratadine (CLARITIN) 10 mg tablet Take 10 mg by mouth daily   • Omega-3 Fatty Acids (fish oil) 1,000 mg Take 4 capsules (4,000 mg total) by mouth daily   • Red Yeast Rice 600 MG CAPS Take 2 capsules (1,200 mg total) by mouth 2 (two) times a day   • Vitamin D, Cholecalciferol, 50 MCG (2000 UT) CAPS 2 caps qd   • [DISCONTINUED] levothyroxine 50 mcg tablet Take 1 tablet (50 mcg total) by mouth daily     Allergies   Allergen Reactions   • Shellfish Allergy - Food Allergy Anaphylaxis   • Shellfish-Derived Products - Food Allergy Anaphylaxis   • Shrimp Flavor - Food Allergy Anaphylaxis   • Darvon [Propoxyphene] Hives   • Tramadol Hives   • Ultram [Tramadol Hcl] Hives     Immunization History   Administered Date(s) Administered   • COVID-19 PFIZER VACCINE 0.3 ML IM 12/23/2020, 01/11/2021, 09/30/2021   • COVID-19 Pfizer vac (Alfredo-sucrose, gray cap) 12 yr+ IM 04/16/2022   • INFLUENZA 10/01/2016, 10/02/2017, 10/30/2019, 10/20/2020, 09/23/2021, 10/03/2022   • Influenza Quadrivalent, 6-35 Months IM 10/02/2017   • Influenza, Quadrivalent (nasal) 10/01/2016   • Tdap 01/19/2022   • Zoster Vaccine Recombinant 07/17/2020, 10/19/2020       Objective     /78 (BP Location: Left arm, Patient Position: Sitting, Cuff Size: Large)   Pulse 80   Temp 97.7 °F (36.5 °C) (Temporal)   Resp 16   Ht 5' 6.93" (1.7 m)   Wt 117 kg (259 lb)   LMP  (LMP Unknown)   SpO2 98%   BMI 40.65 kg/m²     Physical Exam  Cardiovascular:      Rate and Rhythm: Normal rate and regular rhythm. Heart sounds: Normal heart sounds.       Comments: Carotids: no bruits  Ext: no edema  Pulmonary:      Effort: Pulmonary effort is normal. No respiratory distress. Breath sounds: No wheezing or rales. Neurological:      General: No focal deficit present. Mental Status: She is oriented to person, place, and time. Cranial Nerves: No cranial nerve deficit. Motor: No weakness. Gait: Gait normal.      Deep Tendon Reflexes: Reflexes normal.   Psychiatric:         Behavior: Behavior normal.         Thought Content:  Thought content normal.       Blanco Pry, DO

## 2023-07-26 NOTE — ASSESSMENT & PLAN NOTE
Status post pacemaker implantation due to syncopal episode/bradycardia in 1998. Patient subsequently had battery generator replaced in 2007, and again in 2020. She has been doing well.   Continue follow-up with cardiology as directed

## 2023-08-03 ENCOUNTER — REMOTE DEVICE CLINIC VISIT (OUTPATIENT)
Dept: CARDIOLOGY CLINIC | Facility: CLINIC | Age: 63
End: 2023-08-03
Payer: COMMERCIAL

## 2023-08-03 DIAGNOSIS — Z95.0 PRESENCE OF CARDIAC PACEMAKER: Primary | ICD-10-CM

## 2023-08-03 PROCEDURE — 93296 REM INTERROG EVL PM/IDS: CPT | Performed by: INTERNAL MEDICINE

## 2023-08-03 PROCEDURE — 93294 REM INTERROG EVL PM/LDLS PM: CPT | Performed by: INTERNAL MEDICINE

## 2023-08-03 NOTE — PROGRESS NOTES
Results for orders placed or performed in visit on 08/03/23   Cardiac EP device report    Narrative    MDT-DUAL CHAMBER PPM (AAI-DDD MODE) / NOT MRI CONDITIONAL  CARELINK TRANSMISSION: BATTERY VOLTAGE ADEQUATE (9.6 YRS). AP: 28.2%. : 1.2% (MVP-ON). ALL AVAILABLE LEAD PARAMETERS WITHIN NORMAL LIMITS. 3 VT EPISODES W/ EGMS SHOWING SVT-ST, FF OS, NOISE!~W/ -162 BPM  (NO TERMINATION ON STRIPS). 14 FAST A&V EPISODES W/ AVAIL EGMS SHOWING SVT-ST, PAT, FF OS, NOISE~ W/ -167 BPM, MAX DURATION 3 MINS. 1 AT/AF EPISODE W/ EGM SHOWING PAT, NOISE~DURATION 3 MINS 11 SECS. AF BURDEN: <0.1%. 2,106 RATE DROP EPISODES DETECTED. PT TAKES ASA 81MG. EF: 55% (ECHO 1/10/20). NORMAL DEVICE FUNCTION.   [id number] [address]

## 2023-08-11 ENCOUNTER — TELEPHONE (OUTPATIENT)
Dept: FAMILY MEDICINE CLINIC | Facility: CLINIC | Age: 63
End: 2023-08-11

## 2023-08-11 NOTE — TELEPHONE ENCOUNTER
Voicemail from patient:    Hi, this is Bradley Rubio calling Margarita Brandon 1960. I was scheduled last week to have a CT on my head done and it was called. They called to cancel it because the reauthorization for my secondary insurance had not been approved. I got a letter at home stating that they were waiting for the doctor's office to send records in for the secondary prior authorization. So I was wondering if you could follow up with me and let me know if that secondary authorization was that was obtained or not. My phone number is [phone]. Thanks.       Please follow up and contact patient

## 2023-08-25 ENCOUNTER — HOSPITAL ENCOUNTER (OUTPATIENT)
Dept: RADIOLOGY | Facility: HOSPITAL | Age: 63
Discharge: HOME | End: 2023-08-25
Payer: COMMERCIAL

## 2023-08-25 DIAGNOSIS — G43.109 OCULAR MIGRAINE: ICD-10-CM

## 2023-08-25 DIAGNOSIS — H53.9 VISUAL DISTURBANCES: ICD-10-CM

## 2023-08-25 PROCEDURE — G1004 CDSM NDSC: HCPCS

## 2023-08-25 PROCEDURE — 70470 CT HEAD/BRAIN W/O & W/DYE: CPT

## 2023-08-25 RX ADMIN — IOHEXOL 85 ML: 350 INJECTION, SOLUTION INTRAVENOUS at 13:58

## 2023-09-01 ENCOUNTER — HOSPITAL ENCOUNTER (OUTPATIENT)
Dept: MAMMOGRAPHY | Facility: MEDICAL CENTER | Age: 63
Discharge: HOME | End: 2023-09-01
Payer: COMMERCIAL

## 2023-09-01 VITALS — HEIGHT: 67 IN | BODY MASS INDEX: 40.65 KG/M2 | WEIGHT: 259 LBS

## 2023-09-01 DIAGNOSIS — Z12.31 ENCOUNTER FOR SCREENING MAMMOGRAM FOR BREAST CANCER: ICD-10-CM

## 2023-09-01 PROCEDURE — 77063 BREAST TOMOSYNTHESIS BI: CPT

## 2023-09-01 PROCEDURE — 77067 SCR MAMMO BI INCL CAD: CPT

## 2023-09-25 ENCOUNTER — CONSULT (OUTPATIENT)
Dept: GASTROENTEROLOGY | Facility: CLINIC | Age: 63
End: 2023-09-25
Payer: COMMERCIAL

## 2023-09-25 ENCOUNTER — TELEPHONE (OUTPATIENT)
Dept: GASTROENTEROLOGY | Facility: CLINIC | Age: 63
End: 2023-09-25

## 2023-09-25 VITALS
HEIGHT: 67 IN | SYSTOLIC BLOOD PRESSURE: 156 MMHG | WEIGHT: 262 LBS | TEMPERATURE: 97.3 F | BODY MASS INDEX: 41.12 KG/M2 | DIASTOLIC BLOOD PRESSURE: 90 MMHG

## 2023-09-25 DIAGNOSIS — Z11.59 ENCOUNTER FOR HEPATITIS C SCREENING TEST FOR LOW RISK PATIENT: ICD-10-CM

## 2023-09-25 DIAGNOSIS — Z83.719 FAMILY HISTORY OF COLONIC POLYPS: Primary | ICD-10-CM

## 2023-09-25 PROCEDURE — 99243 OFF/OP CNSLTJ NEW/EST LOW 30: CPT | Performed by: INTERNAL MEDICINE

## 2023-09-25 NOTE — TELEPHONE ENCOUNTER
[address] [facility],[address] [facility] [place] [facility]\""

## 2023-09-25 NOTE — PATIENT INSTRUCTIONS
Scheduled date of colonoscopy (as of today): 01/18/2023  Physician performing colonoscopy: Dr. Leonid Trejo   Location of colonoscopy: [address] [facility],[address]  Bowel prep reviewed with patient: Miralax   Instructions reviewed with patient by: Todd Garcia   Clearances:  Referred to Cordell Ott

## 2023-09-25 NOTE — PROGRESS NOTES
[facility] - Outpatient Consultation  Arleen Jalloh 66 y.o. female MRN: [id number]  Encounter: [id number]          ASSESSMENT AND PLAN:      1. Family history of colonic polyps  She has a family history of colon polyps in her mother but since her mother was older than 61 when she was diagnosed with polyps she can continue to have colonoscopies performed every 10 years unless she develops polyps in the future. I will schedule her for a colonoscopy now since it has been 10 years since her last procedure. - Colonoscopy; Future    2. BMI 40.0-44.9, adult (HCC)  Her body mass index is elevated at 41 and has a pacemaker but does not have any other risk factors for her sedation. We will attempt to schedule the procedure at the location of her choosing. 3. Encounter for hepatitis C screening test for low risk patient  She was recently screened for hepatitis C antibody and this was negative so no further evaluation is required.    ______________________________________________________________________    HPI: She presents for evaluation for screening colonoscopy. Her last colonoscopy was 10 years ago in 2013. The procedure was unremarkable as only diverticulosis was noted but no polyps. She reports a family history of colon polyps in her mother when she believes she was in her 62s. She also has a family history of colon cancer in her grandmother. She has not had any bleeding, abdominal pain, change in bowel habits, or weight loss. She recently had a negative hepatitis C antibody screening test.      REVIEW OF SYSTEMS:    CONSTITUTIONAL: Denies any fever, chills, rigors, and weight loss. HEENT: No earache or tinnitus. Denies hearing loss or visual disturbances. CARDIOVASCULAR: No chest pain or palpitations. RESPIRATORY: Denies any cough, hemoptysis, shortness of breath or dyspnea on exertion. GASTROINTESTINAL: As noted in the History of Present Illness.    GENITOURINARY: No problems with urination. Denies any hematuria or dysuria. NEUROLOGIC: No dizziness or vertigo, denies headaches. MUSCULOSKELETAL: Denies any muscle or joint pain. SKIN: Denies skin rashes or itching. ENDOCRINE: Denies excessive thirst. Denies intolerance to heat or cold. PSYCHOSOCIAL: Denies depression or anxiety. Denies any recent memory loss. Historical Information   Past Medical History:   Diagnosis Date    Abnormal blood chemistry     last assessed: 2014    Allergic reaction     last assessed: 2012    Allergic rhinitis     last assessed: 2014    Arthralgia of left shoulder region     last assessed: 12/3/2013    Arthritis     Dysautonomia ([address])     Elevated blood pressure reading without diagnosis of hypertension     last assessed: 2016    Endometriosis     Familial combined hyperlipidemia     last assessed: 2014    Female infertility     Fibroid     Functional murmur     description: soft systolic murmur - no valvular disease on 2D echo Last assessed: 2014    Hypertension     Obstruction of eustachian tube     unspecified laterality Last assessed: 3/17/2014    Polycystic ovary syndrome     PONV (postoperative nausea and vomiting)     Subclinical hypothyroidism 2019    Symptomatic bradycardia     Syncope     Wears glasses      Past Surgical History:   Procedure Laterality Date    APPENDECTOMY      CARDIAC CATHETERIZATION      Description: Normal EF, No obstructive coronary artery disease, systolic hypertension. Helm Redhead done at [facility] Resolved: 1998    CARDIAC PACEMAKER PLACEMENT Left      SECTION      COLONOSCOPY      DEBRIDEMENT TENNIS ELBOW      GANGLION CYST EXCISION Left     hand    HYSTERECTOMY      KNEE ARTHROSCOPY Right     RI SURGICAL ARTHROSCOPY SHOULDER W/ROTATOR CUFF RPR Left 10/03/2016    Procedure: ARTHROSCOPY SHOULDER, ROTATOR CUFF REPAIR,SUBACROMIAL DECOMPRESSION ; MICROFRACTURE MIKAELA GLENOID HEAD;  Surgeon: Mary Ellen Nolasco MD;  Location[facility];  Service: Orthopedics    TONSILLECTOMY      UPPER GASTROINTESTINAL ENDOSCOPY      WISDOM TOOTH EXTRACTION       Social History   Social History     Substance and Sexual Activity   Alcohol Use Yes    Comment: rare     Social History     Substance and Sexual Activity   Drug Use No     Social History     Tobacco Use   Smoking Status Never   Smokeless Tobacco Never     Family History   Problem Relation Age of Onset    Breast cancer Mother 76    Ovarian cancer Mother 80    Multiple myeloma Mother     Liver cancer Father 68    No Known Problems Sister     No Known Problems Sister     No Known Problems Sister     No Known Problems Sister     No Known Problems Daughter     No Known Problems Daughter     Colon cancer Maternal Grandmother 67    Thyroid cancer Maternal Grandfather 66    No Known Problems Paternal Grandmother     No Known Problems Paternal Grandfather     No Known Problems Maternal Aunt     No Known Problems Paternal Aunt     Liver cancer Paternal Aunt 76    Allergies Family         bronchitis    Diabetes Family     Seizures Family     Heart disease Family     Hypertension Family     Skin cancer Family        Meds/Allergies       Current Outpatient Medications:     aspirin 81 MG tablet    azelastine (ASTELIN) 0.1 % nasal spray    Cholecalciferol (Vitamin D3) 50 MCG (2000 UT) TABS    EPINEPHrine (EPIPEN) 0.3 mg/0.3 mL SOAJ    FLUTICASONE PROPIONATE, NASAL, NA    levothyroxine 50 mcg tablet    loratadine (CLARITIN) 10 mg tablet    Omega-3 Fatty Acids (fish oil) 1,000 mg    Red Yeast Rice 600 MG CAPS    Vitamin D, Cholecalciferol, 50 MCG (2000 UT) CAPS    Allergies   Allergen Reactions    Shellfish Allergy - Food Allergy Anaphylaxis    Shellfish-Derived Products - Food Allergy Anaphylaxis    Shrimp Flavor - Food Allergy Anaphylaxis    Darvon [Propoxyphene] Hives    Tramadol Hives    Ultram [Tramadol Hcl] Hives           Objective     Blood pressure 156/90, temperature (!) 97.3 °F (36.3 °C), height 5' 7" (1.702 m), weight 119 kg (262 lb), not currently breastfeeding. Body mass index is 41.04 kg/m². PHYSICAL EXAM:      General Appearance:   Alert, cooperative, no distress   HEENT:   Normocephalic, atraumatic, anicteric. Neck:  Supple, symmetrical, trachea midline   Lungs:   Clear to auscultation bilaterally; no rales, rhonchi or wheezing; respirations unlabored    Heart[de-identified]   Regular rate and rhythm; no murmur, rub, or gallop. Abdomen:   Soft, non-tender, non-distended; normal bowel sounds; no masses, no organomegaly    Genitalia:   Deferred    Rectal:   Deferred    Extremities:  No cyanosis, clubbing or edema    Pulses:  2+ and symmetric    Skin:  No jaundice, rashes, or lesions    Lymph nodes:  No palpable cervical lymphadenopathy        Lab Results:   No visits with results within 1 Day(s) from this visit.    Latest known visit with results is:   Appointment on 07/19/2023   Component Date Value    WBC 07/19/2023 5.97     RBC 07/19/2023 4.31     Hemoglobin 07/19/2023 12.2     Hematocrit 07/19/2023 38.3     MCV 07/19/2023 89     MCH 07/19/2023 28.3     MCHC 07/19/2023 31.9     RDW 07/19/2023 12.7     MPV 07/19/2023 10.2     Platelets 00/91/9416 332     nRBC 07/19/2023 0     Neutrophils Relative 07/19/2023 56     Immat GRANS % 07/19/2023 0     Lymphocytes Relative 07/19/2023 32     Monocytes Relative 07/19/2023 7     Eosinophils Relative 07/19/2023 4     Basophils Relative 07/19/2023 1     Neutrophils Absolute 07/19/2023 3.28     Immature Grans Absolute 07/19/2023 0.02     Lymphocytes Absolute 07/19/2023 1.90     Monocytes Absolute 07/19/2023 0.44     Eosinophils Absolute 07/19/2023 0.26     Basophils Absolute 07/19/2023 0.07     Sodium 07/19/2023 139     Potassium 07/19/2023 4.6     Chloride 07/19/2023 109 (H)     CO2 07/19/2023 28     ANION GAP 07/19/2023 2     BUN 07/19/2023 17     Creatinine 07/19/2023 1.17     Glucose, Fasting 07/19/2023 109 (H)     Calcium 07/19/2023 9.0     Corrected Calcium 07/19/2023 9.6     AST 07/19/2023 15     ALT 07/19/2023 22     Alkaline Phosphatase 07/19/2023 89     Total Protein 07/19/2023 7.3     Albumin 07/19/2023 3.3 (L)     Total Bilirubin 07/19/2023 0.52     eGFR 07/19/2023 50     Hemoglobin A1C 07/19/2023 5.4     EAG 07/19/2023 108     Cholesterol 07/19/2023 192     Triglycerides 07/19/2023 71     HDL, Direct 07/19/2023 53     LDL Calculated 07/19/2023 125 (H)     TSH 3RD GENERATON 07/19/2023 2.351     Hepatitis C Ab 07/19/2023 Non-reactive          Radiology Results:   Mammo screening bilateral w 3d & cad    Result Date: 9/5/2023  Narrative: DIAGNOSIS: Encounter for screening mammogram for breast cancer TECHNIQUE: Digital screening mammography was performed. Computer Aided Detection (CAD) analyzed all applicable images. COMPARISONS: Prior breast imaging dated: 08/29/2022 RELEVANT HISTORY: Family Breast Cancer History: History of breast cancer in Mother. Family Medical History: Family medical history includes breast cancer in mother, colon cancer in maternal grandmother, and ovarian cancer in mother. Personal History: Hormone history includes birth control. Surgical history includes hysterectomy. No known relevant medical history. The patient is scheduled in a reminder system for screening mammography. 8-10% of cancers will be missed on mammography. Management of a palpable abnormality must be based on clinical grounds. Patients will be notified of their results via letter from our facility. Accredited by [employer] of [employer]iology and FDA. RISK ASSESSMENT: 5 Year Tyrer-Cuzick: 3.09 % 10 Year Tyrer-Cuzick: 6.16 % Lifetime Tyrer-Cuzick: 14.4 % TISSUE DENSITY: There are scattered areas of fibroglandular density. INDICATION: Andressa Stinson is a 66 y.o. female presenting for screening mammography. FINDINGS: Bilateral There are no suspicious masses, grouped microcalcifications or areas of unexplained architectural distortion.  The skin and nipple areolar complex are unremarkable. There are benign-appearing calcifications bilaterally. A pacemaker overlies the left axilla, limiting evaluation in this region. Impression: No mammographic evidence of malignancy. ASSESSMENT/BI-RADS CATEGORY: Left: 2 - Benign Right: 2 - Benign Overall: 2 - Benign RECOMMENDATION:      - Routine screening mammogram in 1 year for both breasts. Workstation ID: [id number]     Answers submitted by the patient for this visit:  Abdominal Pain Questionnaire (Submitted on 9/19/2023)  Chief Complaint: Abdominal pain  Frequency: rarely  Progression since onset: resolved  Pain - numeric: 0/10  Radiates to: does not radiate  anorexia: No  arthralgias: Yes  belching: No  constipation: No  diarrhea: No  dysuria: No  fever: No  flatus: No  frequency: No  headaches: No  hematochezia: No  hematuria: No  melena: No  myalgias: Yes  nausea:  No  weight loss: No  vomiting: No  Diagnostic workup: CT scan

## 2023-09-27 ENCOUNTER — ANNUAL EXAM (OUTPATIENT)
Dept: GYNECOLOGY | Facility: CLINIC | Age: 63
End: 2023-09-27
Payer: COMMERCIAL

## 2023-09-27 VITALS
WEIGHT: 262 LBS | SYSTOLIC BLOOD PRESSURE: 134 MMHG | DIASTOLIC BLOOD PRESSURE: 72 MMHG | HEIGHT: 67 IN | BODY MASS INDEX: 41.12 KG/M2

## 2023-09-27 DIAGNOSIS — E66.09 CLASS 2 OBESITY DUE TO EXCESS CALORIES WITHOUT SERIOUS COMORBIDITY WITH BODY MASS INDEX (BMI) OF 37.0 TO 37.9 IN ADULT: ICD-10-CM

## 2023-09-27 DIAGNOSIS — E55.9 VITAMIN D DEFICIENCY: ICD-10-CM

## 2023-09-27 DIAGNOSIS — Z12.31 VISIT FOR SCREENING MAMMOGRAM: ICD-10-CM

## 2023-09-27 DIAGNOSIS — E78.2 MIXED HYPERLIPIDEMIA: ICD-10-CM

## 2023-09-27 DIAGNOSIS — Z83.719 FAMILY HISTORY OF COLONIC POLYPS: ICD-10-CM

## 2023-09-27 DIAGNOSIS — E03.9 HYPOTHYROIDISM (ACQUIRED): ICD-10-CM

## 2023-09-27 DIAGNOSIS — Z80.3 FAMILY HISTORY OF BREAST CANCER IN MOTHER: ICD-10-CM

## 2023-09-27 DIAGNOSIS — Z98.891 HISTORY OF 3 CESAREAN SECTIONS: ICD-10-CM

## 2023-09-27 DIAGNOSIS — Z01.419 WOMEN'S ANNUAL ROUTINE GYNECOLOGICAL EXAMINATION: Primary | ICD-10-CM

## 2023-09-27 DIAGNOSIS — Z95.0 PACEMAKER: ICD-10-CM

## 2023-09-27 DIAGNOSIS — Z90.711 HISTORY OF ABDOMINAL SUPRACERVICAL SUBTOTAL HYSTERECTOMY: ICD-10-CM

## 2023-09-27 PROBLEM — Z90.710 STATUS POST LAPAROSCOPIC ASSISTED VAGINAL HYSTERECTOMY (LAVH): Status: RESOLVED | Noted: 2020-08-17 | Resolved: 2023-09-27

## 2023-09-27 PROCEDURE — G0145 SCR C/V CYTO,THINLAYER,RESCR: HCPCS | Performed by: OBSTETRICS & GYNECOLOGY

## 2023-09-27 PROCEDURE — S0612 ANNUAL GYNECOLOGICAL EXAMINA: HCPCS | Performed by: OBSTETRICS & GYNECOLOGY

## 2023-09-27 PROCEDURE — G0476 HPV COMBO ASSAY CA SCREEN: HCPCS | Performed by: OBSTETRICS & GYNECOLOGY

## 2023-09-27 NOTE — PROGRESS NOTES
Assessment   Annual well woman exam  Asymptomatic menopausal state  History of abdominal supracervical hysterectomy  History of  section x3  Hypothyroidism  Mixed hyperlipidemia  D deficiency  History of breast cancer mother  Family history of colonic polyps  No new GYN complaints or concerns        Plan   Screening mammogram ordered  Colon cancer screening up-to-date   All questions answered. Await pap smear results. Subjective here for annual exam     Arturo Patel is a 66 y.o. female who presents for annual exam.  Asymptomatic menopausal state. Patient denies hot flashes night sweats or vaginal dryness. She is still occasionally sexually active denies any pelvic pain symptoms or vaginal bleeding. The patient reports that there is not domestic violence in her life. Current contraception: status post hysterectomy  History of abnormal Pap smear: no  Family history of uterine or ovarian cancer: yes -maternal  Regular self breast exam: yes  History of abnormal mammogram: no  Family history of breast cancer: yes -maternal  History of abnormal lipids: yes -hyperlipidemia  Menstrual History:  OB History        5    Para   4    Term   3       1    AB   1    Living   3       SAB   1    IAB        Ectopic        Multiple        Live Births   1                Menarche age: 17  No LMP recorded (lmp unknown). Patient has had a hysterectomy. Review of Systems  Pertinent items are noted in HPI.       Objective no acute distress   /72   Ht 5' 7" (1.702 m)   Wt 119 kg (262 lb)   LMP  (LMP Unknown)   BMI 41.04 kg/m²     General:   alert and oriented, in no acute distress, alert, appears stated age and cooperative   Heart: regular rate and rhythm, S1, S2 normal, no murmur, click, rub or gallop   Lungs: clear to auscultation bilaterally   Abdomen: soft, non-tender, without masses or organomegaly   Vulva: normal, Bartholin's, Urethra, [place]'s normal, female escutcheon   Vagina: normal mucosa, normal discharge, no palpable nodules   Cervix: no bleeding following Pap, no cervical motion tenderness, no lesions and Flattened cervix, no lesions   Uterus: surgically absent   Adnexa: surgically absent   Bilateral breast exam in the sitting and supine position with chaperone present, no visible or palpable breast lesions identified. No breast masses noted. No supraclavicular or axillary lymphadenopathy noted. No nipple discharge. Reviewed self-breast exam techniques.    Rectal exam,  deferred

## 2023-09-28 LAB
HPV HR 12 DNA CVX QL NAA+PROBE: NEGATIVE
HPV16 DNA CVX QL NAA+PROBE: NEGATIVE
HPV18 DNA CVX QL NAA+PROBE: NEGATIVE

## 2023-10-05 LAB
LAB AP GYN PRIMARY INTERPRETATION: NORMAL
Lab: NORMAL

## 2023-11-06 DIAGNOSIS — R11.0 NAUSEA: ICD-10-CM

## 2023-11-06 DIAGNOSIS — T78.40XD ALLERGIC REACTION, SUBSEQUENT ENCOUNTER: Primary | ICD-10-CM

## 2023-11-06 RX ORDER — SCOLOPAMINE TRANSDERMAL SYSTEM 1 MG/1
1 PATCH, EXTENDED RELEASE TRANSDERMAL
Qty: 4 PATCH | Refills: 1 | Status: SHIPPED | OUTPATIENT
Start: 2023-11-06

## 2023-11-06 RX ORDER — EPINEPHRINE 0.3 MG/.3ML
0.3 INJECTION SUBCUTANEOUS ONCE
Qty: 0.6 ML | Refills: 0 | Status: SHIPPED | OUTPATIENT
Start: 2023-11-06 | End: 2023-11-06

## 2023-11-07 ENCOUNTER — REMOTE DEVICE CLINIC VISIT (OUTPATIENT)
Dept: CARDIOLOGY CLINIC | Facility: CLINIC | Age: 63
End: 2023-11-07
Payer: COMMERCIAL

## 2023-11-07 DIAGNOSIS — Z95.0 PRESENCE OF PERMANENT CARDIAC PACEMAKER: Primary | ICD-10-CM

## 2023-11-07 PROCEDURE — 93296 REM INTERROG EVL PM/IDS: CPT | Performed by: INTERNAL MEDICINE

## 2023-11-07 PROCEDURE — 93294 REM INTERROG EVL PM/LDLS PM: CPT | Performed by: INTERNAL MEDICINE

## 2023-11-07 NOTE — PROGRESS NOTES
MDT-DUAL CHAMBER PPM (AAI-DDD MODE) / NOT MRI CONDITIONAL   CARELINK TRANSMISSION:  BATTERY VOLTAGE ADEQUATE (9.7 YR.). AP 27.9%  1.6%. ALL LEAD PARAMETERS WITHIN NORMAL LIMITS. SINCE 8/2/23;  1 DEVICE CLASSIFIED AT/AF EPISODE WITH EGM SHOWING BRIEF MYOPOTENTIAL OVERSENSING, NO AF.  3 DEVICE CLASSIFIED VT EPISODES, AND 5 FAST A&V EPISODES WITH ALL EGMS SHOWING SVT WITH -165 BPM.  NORMAL DEVICE FUNCTION.   RG

## 2024-01-04 ENCOUNTER — ANESTHESIA EVENT (OUTPATIENT)
Dept: ANESTHESIOLOGY | Facility: HOSPITAL | Age: 64
End: 2024-01-04

## 2024-01-04 ENCOUNTER — ANESTHESIA (OUTPATIENT)
Dept: ANESTHESIOLOGY | Facility: HOSPITAL | Age: 64
End: 2024-01-04

## 2024-01-18 ENCOUNTER — ANESTHESIA (OUTPATIENT)
Dept: GASTROENTEROLOGY | Facility: AMBULARY SURGERY CENTER | Age: 64
End: 2024-01-18

## 2024-01-18 ENCOUNTER — ANESTHESIA EVENT (OUTPATIENT)
Dept: GASTROENTEROLOGY | Facility: AMBULARY SURGERY CENTER | Age: 64
End: 2024-01-18

## 2024-01-18 ENCOUNTER — HOSPITAL ENCOUNTER (OUTPATIENT)
Dept: GASTROENTEROLOGY | Facility: AMBULARY SURGERY CENTER | Age: 64
Setting detail: OUTPATIENT SURGERY
End: 2024-01-18
Attending: INTERNAL MEDICINE
Payer: COMMERCIAL

## 2024-01-18 VITALS
TEMPERATURE: 97.2 F | WEIGHT: 252 LBS | HEART RATE: 53 BPM | HEIGHT: 67 IN | RESPIRATION RATE: 16 BRPM | SYSTOLIC BLOOD PRESSURE: 151 MMHG | DIASTOLIC BLOOD PRESSURE: 72 MMHG | OXYGEN SATURATION: 99 % | BODY MASS INDEX: 39.55 KG/M2

## 2024-01-18 DIAGNOSIS — Z83.719 FAMILY HISTORY OF COLONIC POLYPS: ICD-10-CM

## 2024-01-18 PROCEDURE — 88305 TISSUE EXAM BY PATHOLOGIST: CPT | Performed by: PATHOLOGY

## 2024-01-18 PROCEDURE — 45380 COLONOSCOPY AND BIOPSY: CPT | Performed by: INTERNAL MEDICINE

## 2024-01-18 RX ORDER — LIDOCAINE HYDROCHLORIDE 10 MG/ML
INJECTION, SOLUTION EPIDURAL; INFILTRATION; INTRACAUDAL; PERINEURAL AS NEEDED
Status: DISCONTINUED | OUTPATIENT
Start: 2024-01-18 | End: 2024-01-18

## 2024-01-18 RX ORDER — PROPOFOL 10 MG/ML
INJECTION, EMULSION INTRAVENOUS AS NEEDED
Status: DISCONTINUED | OUTPATIENT
Start: 2024-01-18 | End: 2024-01-18

## 2024-01-18 RX ORDER — SODIUM CHLORIDE, SODIUM LACTATE, POTASSIUM CHLORIDE, CALCIUM CHLORIDE 600; 310; 30; 20 MG/100ML; MG/100ML; MG/100ML; MG/100ML
INJECTION, SOLUTION INTRAVENOUS CONTINUOUS PRN
Status: DISCONTINUED | OUTPATIENT
Start: 2024-01-18 | End: 2024-01-18

## 2024-01-18 RX ADMIN — SODIUM CHLORIDE, SODIUM LACTATE, POTASSIUM CHLORIDE, AND CALCIUM CHLORIDE: .6; .31; .03; .02 INJECTION, SOLUTION INTRAVENOUS at 08:40

## 2024-01-18 RX ADMIN — PROPOFOL 80 MG: 10 INJECTION, EMULSION INTRAVENOUS at 09:02

## 2024-01-18 RX ADMIN — PROPOFOL 130 MCG/KG/MIN: 10 INJECTION, EMULSION INTRAVENOUS at 09:03

## 2024-01-18 RX ADMIN — LIDOCAINE HYDROCHLORIDE 25 MG: 10 INJECTION, SOLUTION EPIDURAL; INFILTRATION; INTRACAUDAL at 09:02

## 2024-01-18 NOTE — H&P
History and Physical -  [facility]  Carol Silva 63 y.o. female MRN: [id number]                  HPI: Carol Silva is a 63 y.o. year old female who presents for history of colon polyps.      REVIEW OF SYSTEMS: Per the HPI, and otherwise unremarkable.    Historical Information   Past Medical History:   Diagnosis Date    Abnormal blood chemistry     last assessed: 2014    Allergic reaction     last assessed: 2012    Allergic rhinitis     last assessed: 2014    Arthralgia of left shoulder region     last assessed: 12/3/2013    Arthritis     Bradycardia     Disease of thyroid gland     Dysautonomia (HCC)     Elevated blood pressure reading without diagnosis of hypertension     last assessed: 2016    Endometriosis     Familial combined hyperlipidemia     last assessed: 2014    Female infertility     Fibroid     Functional murmur     description: soft systolic murmur - no valvular disease on 2D echo Last assessed: 2014    Hypertension     Obstruction of eustachian tube     unspecified laterality Last assessed: 3/17/2014    Polycystic ovary syndrome     PONV (postoperative nausea and vomiting)     Subclinical hypothyroidism 2019    Symptomatic bradycardia     Syncope     Wears glasses      Past Surgical History:   Procedure Laterality Date    APPENDECTOMY      CARDIAC CATHETERIZATION      Description: Normal EF, No obstructive coronary artery disease, systolic hypertension..done at [facility] Resolved: 1998    CARDIAC PACEMAKER PLACEMENT Left      SECTION      COLONOSCOPY      DEBRIDEMENT TENNIS ELBOW      GANGLION CYST EXCISION Left     hand    HYSTERECTOMY      KNEE ARTHROSCOPY Right     DE SURGICAL ARTHROSCOPY SHOULDER W/ROTATOR CUFF RPR Left 10/03/2016    Procedure: ARTHROSCOPY SHOULDER, ROTATOR CUFF REPAIR,SUBACROMIAL DECOMPRESSION ; MICROFRACTURE MIKAELA GLENOID HEAD;  Surgeon: Evens Joseph MD;  Location: [facility];  Service: Orthopedics  "   TONSILLECTOMY      UPPER GASTROINTESTINAL ENDOSCOPY      WISDOM TOOTH EXTRACTION       Social History   Social History     Substance and Sexual Activity   Alcohol Use Yes    Comment: less than 1 per month     Social History     Substance and Sexual Activity   Drug Use No     Social History     Tobacco Use   Smoking Status Never   Smokeless Tobacco Never     Family History   Problem Relation Age of Onset    Breast cancer Mother 74    Ovarian cancer Mother 82    Multiple myeloma Mother     Liver cancer Father 73    No Known Problems Sister     No Known Problems Sister     No Known Problems Sister     No Known Problems Sister     No Known Problems Daughter     No Known Problems Daughter     Colon cancer Maternal Grandmother 72    Thyroid cancer Maternal Grandfather 78    No Known Problems Paternal Grandmother     No Known Problems Paternal Grandfather     No Known Problems Maternal Aunt     No Known Problems Paternal Aunt     Liver cancer Paternal Aunt 75    Allergies Family         bronchitis    Diabetes Family     Seizures Family     Heart disease Family     Hypertension Family     Skin cancer Family        Meds/Allergies       Current Outpatient Medications:     aspirin 81 MG tablet    Cholecalciferol (Vitamin D3) 50 MCG (2000 UT) TABS    levothyroxine 50 mcg tablet    loratadine (CLARITIN) 10 mg tablet    Omega-3 Fatty Acids (fish oil) 1,000 mg    Red Yeast Rice 600 MG CAPS    azelastine (ASTELIN) 0.1 % nasal spray    EPINEPHrine (EPIPEN) 0.3 mg/0.3 mL SOAJ    FLUTICASONE PROPIONATE, NASAL, NA    Allergies   Allergen Reactions    Shellfish Allergy - Food Allergy Anaphylaxis    Shellfish-Derived Products - Food Allergy Anaphylaxis    Shrimp Flavor - Food Allergy Anaphylaxis    Darvon [Propoxyphene] Hives    Tramadol Hives    Ultram [Tramadol Hcl] Hives       Objective     /79   Pulse 66   Temp (!) 97.2 °F (36.2 °C) (Temporal)   Resp 16   Ht 5' 7\" (1.702 m)   Wt 114 kg (252 lb)   LMP  (LMP Unknown)   " SpO2 100%   BMI 39.47 kg/m²       PHYSICAL EXAM    Gen: NAD  Head: NCAT  CV: RRR  CHEST: Clear  ABD: soft, NT/ND  EXT: no edema      ASSESSMENT/PLAN:  This is a 63 y.o. year old female here for colonoscopy, and she is stable and optimized for her procedure.

## 2024-01-18 NOTE — ANESTHESIA POSTPROCEDURE EVALUATION
Post-Op Assessment Note    CV Status:  Stable  Pain Score: 0    Pain management: adequate       Mental Status:  Arousable and sleepy   Hydration Status:  Euvolemic   PONV Controlled:  Controlled   Airway Patency:  Patent     Post Op Vitals Reviewed: Yes      Staff: CRNA               /64 (01/18/24 0923)    Temp (!) 97.2 °F (36.2 °C) (01/18/24 0923)    Pulse (!) 50 (occasional paced beat) (01/18/24 0923)   Resp 16 (01/18/24 0923)    SpO2 97 % (01/18/24 0923)

## 2024-01-24 PROCEDURE — 88305 TISSUE EXAM BY PATHOLOGIST: CPT | Performed by: PATHOLOGY

## 2024-01-31 ENCOUNTER — IN-CLINIC DEVICE VISIT (OUTPATIENT)
Dept: CARDIOLOGY CLINIC | Facility: CLINIC | Age: 64
End: 2024-01-31
Payer: COMMERCIAL

## 2024-01-31 ENCOUNTER — TELEPHONE (OUTPATIENT)
Dept: CARDIOLOGY CLINIC | Facility: CLINIC | Age: 64
End: 2024-01-31

## 2024-01-31 DIAGNOSIS — Z95.0 PRESENCE OF PERMANENT CARDIAC PACEMAKER: Primary | ICD-10-CM

## 2024-01-31 PROCEDURE — 93280 PM DEVICE PROGR EVAL DUAL: CPT | Performed by: INTERNAL MEDICINE

## 2024-01-31 NOTE — PROGRESS NOTES
Results for orders placed or performed in visit on 01/31/24   Cardiac EP device report    Narrative    MDT-DUAL CHAMBER PPM (AAI-DDD MODE) / NOT MRI CONDITIONAL  DEVICE INTERROGATED IN THE [place] OFFICE. BATTERY VOLTAGE ADEQUATE(9.2 YRS). AP 25.2%  1.4% ALL LEAD PARAMETERS WITHIN NORMAL LIMITS. 4 NEW FAST A&V EPISODES W/ EGMS SHOWING SVT W/ SOME MYOPOTENTIAL NOISE UP  BPM, LONGEST 3 MINS 3 SECS. HX OF THE SAME. NO BB, TASKED TO DR. RODRIGUEZ. NO PROGRAMMING CHANGES MADE TO DEVICE PARAMETERS. NORMAL DEVICE FUNCTION. AM

## 2024-01-31 NOTE — TELEPHONE ENCOUNTER
----- Message from Thanh Baker MD sent at 1/31/2024 11:41 AM EST -----  Normal device function.  Has brief episodes of SVT    EP team ask patient if she is willing to take metoprolol low dose to control SVT episodes?     Thanks.

## 2024-01-31 NOTE — TELEPHONE ENCOUNTER
I spoke with patient about SVT episodes on device transmission.  She is open to taking a BB, please provide dose.  She is concerned about dropping HR to low but I did explain that her pacemaker would kick in if need be for that reason so she is safe.    She is asking if you would like a recheck of her device sooner than the 3 month period?

## 2024-02-01 ENCOUNTER — OFFICE VISIT (OUTPATIENT)
Dept: FAMILY MEDICINE CLINIC | Facility: CLINIC | Age: 64
End: 2024-02-01
Payer: COMMERCIAL

## 2024-02-01 VITALS
DIASTOLIC BLOOD PRESSURE: 84 MMHG | TEMPERATURE: 98.5 F | OXYGEN SATURATION: 97 % | HEIGHT: 67 IN | SYSTOLIC BLOOD PRESSURE: 126 MMHG | WEIGHT: 260.4 LBS | HEART RATE: 75 BPM | BODY MASS INDEX: 40.87 KG/M2

## 2024-02-01 DIAGNOSIS — Z11.59 NEED FOR HEPATITIS C SCREENING TEST: ICD-10-CM

## 2024-02-01 DIAGNOSIS — E03.8 SUBCLINICAL HYPOTHYROIDISM: ICD-10-CM

## 2024-02-01 DIAGNOSIS — E78.2 MIXED HYPERLIPIDEMIA: ICD-10-CM

## 2024-02-01 DIAGNOSIS — E66.09 CLASS 2 OBESITY DUE TO EXCESS CALORIES WITHOUT SERIOUS COMORBIDITY WITH BODY MASS INDEX (BMI) OF 37.0 TO 37.9 IN ADULT: Primary | ICD-10-CM

## 2024-02-01 DIAGNOSIS — R73.09 ABNORMAL BLOOD SUGAR: ICD-10-CM

## 2024-02-01 DIAGNOSIS — Z13.0 SCREENING FOR DEFICIENCY ANEMIA: ICD-10-CM

## 2024-02-01 DIAGNOSIS — N18.30 STAGE 3 CHRONIC KIDNEY DISEASE, UNSPECIFIED WHETHER STAGE 3A OR 3B CKD (HCC): ICD-10-CM

## 2024-02-01 DIAGNOSIS — I47.10 SVT (SUPRAVENTRICULAR TACHYCARDIA): Primary | ICD-10-CM

## 2024-02-01 DIAGNOSIS — R00.1 SINUS BRADYCARDIA: ICD-10-CM

## 2024-02-01 DIAGNOSIS — E55.9 VITAMIN D DEFICIENCY: ICD-10-CM

## 2024-02-01 PROCEDURE — 99214 OFFICE O/P EST MOD 30 MIN: CPT | Performed by: FAMILY MEDICINE

## 2024-02-01 RX ORDER — METOPROLOL SUCCINATE 25 MG/1
25 TABLET, EXTENDED RELEASE ORAL DAILY
Qty: 30 TABLET | Refills: 3 | Status: SHIPPED | OUTPATIENT
Start: 2024-02-01

## 2024-02-01 RX ORDER — LEVOTHYROXINE SODIUM 0.05 MG/1
50 TABLET ORAL DAILY
Qty: 90 TABLET | Refills: 3 | Status: SHIPPED | OUTPATIENT
Start: 2024-02-01

## 2024-02-01 NOTE — ASSESSMENT & PLAN NOTE
Lab Results   Component Value Date    EGFR 50 07/19/2023    EGFR 54 07/18/2022    EGFR 59 07/08/2021    CREATININE 1.17 07/19/2023    CREATININE 1.10 07/18/2022    CREATININE 1.04 07/08/2021   GFR has been stable.  Will continue to follow

## 2024-02-01 NOTE — ASSESSMENT & PLAN NOTE
Weight 260, BMI 40.78.  Patient recently decreased work hours to 2 days/week.  She will hopefully have more time to work on healthy diet and regular exercise.  Will continue to follow.

## 2024-02-01 NOTE — TELEPHONE ENCOUNTER
Spoke with patient this morning and she will start metoprolol 25mg daily and keep an eye on her BP as well.  I told her that if it makes her tired, she can try taking it later in the day as well.

## 2024-02-01 NOTE — ASSESSMENT & PLAN NOTE
History of sinus bradycardia.  Status post pacemaker implantation due to syncopal episode 1998.  Patient subsequently had battery generator replaced in 2007, and again in 2020.  Recent pacemaker interrogation revealed increased SVT runs.  Cardiology is considering starting beta-blocker.

## 2024-02-01 NOTE — PROGRESS NOTES
Name: Carol Silva      : 1960      MRN: [id number]  Encounter Provider: Dmitriy Holley DO  Encounter Date: 2024   Encounter department: [facility]    Assessment & Plan     1. Class 2 obesity due to excess calories without serious comorbidity with body mass index (BMI) of 37.0 to 37.9 in adult  Assessment & Plan:  Weight 260, BMI 40.78.  Patient recently decreased work hours to 2 days/week.  She will hopefully have more time to work on healthy diet and regular exercise.  Will continue to follow.      2. Sinus bradycardia  Assessment & Plan:  History of sinus bradycardia.  Status post pacemaker implantation due to syncopal episode .  Patient subsequently had battery generator replaced in , and again in .  Recent pacemaker interrogation revealed increased SVT runs.  Cardiology is considering starting beta-blocker.      3. Stage 3 chronic kidney disease, unspecified whether stage 3a or 3b CKD (HCC)  Assessment & Plan:  Lab Results   Component Value Date    EGFR 50 2023    EGFR 54 2022    EGFR 59 2021    CREATININE 1.17 2023    CREATININE 1.10 2022    CREATININE 1.04 2021   GFR has been stable.  Will continue to follow    Orders:  -     Comprehensive metabolic panel; Future; Expected date: 2024    4. Subclinical hypothyroidism  Assessment & Plan:  Doing well on levothyroxine 50 mcg daily.  Will continue to monitor    Orders:  -     TSH, 3rd generation with Free T4 reflex; Future; Expected date: 2024  -     levothyroxine 50 mcg tablet; Take 1 tablet (50 mcg total) by mouth daily    5. Mixed hyperlipidemia  Assessment & Plan:  Continue low-fat diet and exercise.  Continue red yeast rice and omega-3.    Orders:  -     Lipid Panel with Direct LDL reflex; Future; Expected date: 2024    6. Abnormal blood sugar  Assessment & Plan:  History of mildly elevated blood sugar.  Last A1c 5.4%.  Continue to work on reduced carb diet,  exercise, and weight loss    Orders:  -     Comprehensive metabolic panel; Future; Expected date: 06/01/2024  -     Hemoglobin A1C; Future; Expected date: 06/01/2024    7. Vitamin D deficiency  Assessment & Plan:  Continue OTC vitamin D supplement as well as calcium supplementation.      8. Screening for deficiency anemia  -     CBC and differential; Future; Expected date: 06/01/2024    9. Need for hepatitis C screening test  -     Hepatitis C antibody; Future; Expected date: 06/01/2024         Colonoscopy 2023 (next 2033)  Mammogram scheduled    Patient had COVID booster  Patient had flu shot this season  Last tetanus booster 2022  Patient had Shingrix vaccination  Discussed RSV vaccine    6 months, physical, yearly labs prior  Subjective     Patient presents for recheck chronic medical problems today.  Overall she is feeling well.  She recently has gone to part-time (2 days/week).  She states she feels much less stress.  Compliant with prescribed medications.  Still being followed by cardiology      Review of Systems   Respiratory: Negative.     Cardiovascular: Negative.    Gastrointestinal: Negative.    Genitourinary: Negative.        Past Medical History:   Diagnosis Date   • Abnormal blood chemistry     last assessed: 6/9/2014   • Allergic    • Allergic reaction     last assessed: 9/17/2012   • Allergic rhinitis     last assessed: 6/9/2014   • Arthralgia of left shoulder region     last assessed: 12/3/2013   • Arthritis    • Bradycardia    • Disease of thyroid gland    • Dysautonomia (HCC)    • Elevated blood pressure reading without diagnosis of hypertension     last assessed: 9/30/2016   • Endometriosis    • Familial combined hyperlipidemia     last assessed: 6/9/2014   • Female infertility    • Fibroid    • Functional murmur     description: soft systolic murmur - no valvular disease on 2D echo Last assessed: 4/23/2014   • Heart murmur 2000   • Hypertension    • Obstruction of eustachian tube     unspecified  laterality Last assessed: 3/17/2014   • Polycystic ovary syndrome    • PONV (postoperative nausea and vomiting)    • Subclinical hypothyroidism 2019   • Symptomatic bradycardia    • Syncope    • Wears glasses      Past Surgical History:   Procedure Laterality Date   • APPENDECTOMY     • CARDIAC CATHETERIZATION      Description: Normal EF, No obstructive coronary artery disease, systolic hypertension..done at [facility] Resolved: 1998   • CARDIAC PACEMAKER PLACEMENT Left    •  SECTION     • COLONOSCOPY     • DEBRIDEMENT TENNIS ELBOW     • GANGLION CYST EXCISION Left     hand   • HYSTERECTOMY     • KNEE ARTHROSCOPY Right    • IA SURGICAL ARTHROSCOPY SHOULDER W/ROTATOR CUFF RPR Left 10/03/2016    Procedure: ARTHROSCOPY SHOULDER, ROTATOR CUFF REPAIR,SUBACROMIAL DECOMPRESSION ; MICROFRACTURE MIKAELA GLENOID HEAD;  Surgeon: Evens Joseph MD;  Location: [facility];  Service: Orthopedics   • TONSILLECTOMY     • TUBAL LIGATION     • UPPER GASTROINTESTINAL ENDOSCOPY     • WISDOM TOOTH EXTRACTION       Family History   Problem Relation Age of Onset   • Breast cancer Mother 74   • Ovarian cancer Mother 82   • Multiple myeloma Mother    • Cancer Mother         Breast, Ovarian   • Liver cancer Father 73   • Hypertension Father    • Diabetes Father    • Arthritis Father            • Cancer Father         Liver cancer   • No Known Problems Sister    • No Known Problems Sister    • No Known Problems Sister    • No Known Problems Sister    • No Known Problems Daughter    • No Known Problems Daughter    • Colon cancer Maternal Grandmother 72   • Thyroid cancer Maternal Grandfather 78   • No Known Problems Paternal Grandmother    • No Known Problems Paternal Grandfather    • No Known Problems Maternal Aunt    • No Known Problems Paternal Aunt    • Liver cancer Paternal Aunt 75   • Allergies Family         bronchitis   • Diabetes Family    • Seizures Family    • Heart disease Family    •  Hypertension Family    • Skin cancer Family      Social History     Socioeconomic History   • Marital status: /Civil Union     Spouse name: None   • Number of children: 3   • Years of education: None   • Highest education level: None   Occupational History   • None   Tobacco Use   • Smoking status: Never     Passive exposure: Never   • Smokeless tobacco: Never   Vaping Use   • Vaping status: Never Used   Substance and Sexual Activity   • Alcohol use: Yes     Comment: at ocassions only   • Drug use: No   • Sexual activity: Yes     Partners: Male     Birth control/protection: Female Sterilization   Other Topics Concern   • None   Social History Narrative    Living independently with spouse    Caffeine use      Social Determinants of Health     Financial Resource Strain: Not on file   Food Insecurity: Not on file   Transportation Needs: Not on file   Physical Activity: Not on file   Stress: Not on file   Social Connections: Not on file   Intimate Partner Violence: Not on file   Housing Stability: Not on file     Current Outpatient Medications on File Prior to Visit   Medication Sig   • aspirin 81 MG tablet Take 81 mg by mouth daily   • azelastine (ASTELIN) 0.1 % nasal spray 2 sprays into each nostril daily Use in each nostril as directed   • Cholecalciferol (Vitamin D3) 50 MCG (2000 UT) TABS 2 tabs daily   • EPINEPHrine (EPIPEN) 0.3 mg/0.3 mL SOAJ Inject 0.3 mL (0.3 mg total) into a muscle as needed for anaphylaxis (twin pack)   • FLUTICASONE PROPIONATE, NASAL, NA 2 sprays into each nostril as needed     • loratadine (CLARITIN) 10 mg tablet Take 10 mg by mouth daily   • Omega-3 Fatty Acids (fish oil) 1,000 mg Take 4 capsules (4,000 mg total) by mouth daily   • Red Yeast Rice 600 MG CAPS Take 2 capsules (1,200 mg total) by mouth 2 (two) times a day   • [DISCONTINUED] levothyroxine 50 mcg tablet Take 1 tablet (50 mcg total) by mouth daily     Allergies   Allergen Reactions   • Shellfish Allergy - Food Allergy  "Anaphylaxis   • Shellfish-Derived Products - Food Allergy Anaphylaxis   • Shrimp Flavor - Food Allergy Anaphylaxis   • Darvon [Propoxyphene] Hives   • Tramadol Hives   • Ultram [Tramadol Hcl] Hives     Immunization History   Administered Date(s) Administered   • COVID-19 Moderna Vac BIVALENT 12 Yr+ IM 0.5 ML 09/25/2022   • COVID-19 PFIZER VACCINE 0.3 ML IM 12/23/2020, 01/11/2021, 09/30/2021   • COVID-19 Pfizer mRNA vacc PF alfredo-sucrose 12 yr and older (Comirnaty) 09/27/2023   • COVID-19 Pfizer vac (Alfredo-sucrose, gray cap) 12 yr+ IM 04/16/2022   • INFLUENZA 10/01/2016, 10/02/2017, 10/30/2019, 10/20/2020, 09/23/2021, 10/03/2022, 10/02/2023   • Influenza Quadrivalent, 6-35 Months IM 10/02/2017   • Influenza, Quadrivalent (nasal) 10/01/2016   • Tdap 01/19/2022   • Zoster Vaccine Recombinant 07/17/2020, 10/19/2020       Objective     /84 (BP Location: Left arm, Patient Position: Sitting, Cuff Size: Large)   Pulse 75   Temp 98.5 °F (36.9 °C)   Ht 5' 7\" (1.702 m)   Wt 118 kg (260 lb 6.4 oz)   LMP  (LMP Unknown)   SpO2 97%   BMI 40.78 kg/m²     Physical Exam  Cardiovascular:      Rate and Rhythm: Normal rate and regular rhythm.      Heart sounds: Normal heart sounds.      Comments: Carotids: no bruits  Ext: no edema  Pulmonary:      Effort: Pulmonary effort is normal. No respiratory distress.      Breath sounds: No wheezing or rales.   Psychiatric:         Behavior: Behavior normal.         Thought Content: Thought content normal.       Dmitriy Holley, DO    "

## 2024-02-01 NOTE — TELEPHONE ENCOUNTER
Thanks,    I sent metoprolol XL 25 mg daily to her pharmacy.    You are right, the device will kick in and prevent her heart from going slow therefore she does not need to have another check sooner than 3 months

## 2024-02-01 NOTE — ASSESSMENT & PLAN NOTE
History of mildly elevated blood sugar.  Last A1c 5.4%.  Continue to work on reduced carb diet, exercise, and weight loss

## 2024-03-19 ENCOUNTER — APPOINTMENT (OUTPATIENT)
Dept: RADIOLOGY | Facility: MEDICAL CENTER | Age: 64
End: 2024-03-19
Payer: COMMERCIAL

## 2024-03-19 ENCOUNTER — OFFICE VISIT (OUTPATIENT)
Dept: OBGYN CLINIC | Facility: MEDICAL CENTER | Age: 64
End: 2024-03-19
Payer: COMMERCIAL

## 2024-03-19 VITALS
WEIGHT: 258.4 LBS | HEIGHT: 67 IN | BODY MASS INDEX: 40.56 KG/M2 | HEART RATE: 65 BPM | DIASTOLIC BLOOD PRESSURE: 84 MMHG | SYSTOLIC BLOOD PRESSURE: 146 MMHG

## 2024-03-19 DIAGNOSIS — Z01.89 ENCOUNTER FOR LOWER EXTREMITY COMPARISON IMAGING STUDY: ICD-10-CM

## 2024-03-19 DIAGNOSIS — M17.11 ARTHRITIS OF RIGHT KNEE: ICD-10-CM

## 2024-03-19 DIAGNOSIS — M25.561 RIGHT KNEE PAIN, UNSPECIFIED CHRONICITY: ICD-10-CM

## 2024-03-19 DIAGNOSIS — M23.91 INTERNAL DERANGEMENT OF RIGHT KNEE: ICD-10-CM

## 2024-03-19 DIAGNOSIS — M25.561 RIGHT KNEE PAIN, UNSPECIFIED CHRONICITY: Primary | ICD-10-CM

## 2024-03-19 PROCEDURE — 73564 X-RAY EXAM KNEE 4 OR MORE: CPT

## 2024-03-19 PROCEDURE — 73560 X-RAY EXAM OF KNEE 1 OR 2: CPT

## 2024-03-19 PROCEDURE — 99214 OFFICE O/P EST MOD 30 MIN: CPT | Performed by: ORTHOPAEDIC SURGERY

## 2024-03-19 NOTE — PROGRESS NOTES
Assessment/Plan     1. Right knee pain, unspecified chronicity    2. Encounter for lower extremity comparison imaging study    3. Internal derangement of right knee    4. Arthritis of right knee      Orders Placed This Encounter   Procedures    XR knee 4+ vw right injury    XR knee 1 or 2 vw left    Ambulatory referral to Physical Therapy       The patient has mild right knee arthritis.  We discussed treatment options as well as risks and benefits of treatment options.  Physical therapy referral was given to patient at today's visit.  Patient may continue Motrin as needed for pain control.  Continue ice and elevation as needed.  Patient cannot have an MRI due to having a pacemaker   Patient will follow-up in 6 weeks.  If patient continues to have pain within the right knee we can consider CT arthrogram versus right knee steroid injection.    Return in about 6 weeks (around 4/30/2024) for appt with Dr. Joseph or Dr. Elizabeth.    I answered all of the patient's questions during the visit and provided education of the patient's condition during the visit.  The patient verbalized understanding of the information given and agrees with the plan.  This note was dictated using [employer] software.  It may contain errors including improperly dictated words.  Please contact physician directly for any questions.    History of Present Illness   Chief complaint:   Chief Complaint   Patient presents with    Right Knee - Pain       HPI: Carol Silva is a 63 y.o. female that c/o right knee pain.    Length of time knee pain has been present: 10 days  Any falls or trauma associated with onset of pain: Patient has been remodeling her kitchen and states she squatted down to  bucket and heard a pop within her right knee.  Patient states that same day she had swelling within the right knee but noticed it has decreased since BHUPINDER.   Location of pain: medial aspect   Intermittent or constant: intermittent  Description of pain: dull  achy pain  Aggravating factors: weightbearing activities   Instability or locking: reports locking and clicking within the knee  Pain medication that has been tried: motrin as needed, mainly at night  Topical mediation that has been tried: no  Has heat/ice/elevation been tried: yes  Can NSAIDs be taken?  If not why?: yes  Has PT or home exercises been tried?: no  Has bracing been tried? OTC or rx?  no  Have injections been tried?  Steroid/visco?: no  Any history of surgery on that knee?:  Patient states she did have a meniscectomy 20 years ago but does not remember if it is her right knee or left knee.       ROS:    See HPI for musculoskeletal review.   All other systems reviewed are negative     Historical Information   Past Medical History:   Diagnosis Date    Abnormal blood chemistry     last assessed: 6/9/2014    Allergic     Allergic reaction     last assessed: 9/17/2012    Allergic rhinitis     last assessed: 6/9/2014    Arthralgia of left shoulder region     last assessed: 12/3/2013    Arthritis     Bradycardia     Disease of thyroid gland     Dysautonomia (HCC)     Elevated blood pressure reading without diagnosis of hypertension     last assessed: 9/30/2016    Endometriosis     Familial combined hyperlipidemia     last assessed: 6/9/2014    Female infertility     Fibroid     Functional murmur     description: soft systolic murmur - no valvular disease on 2D echo Last assessed: 4/23/2014    Heart murmur 2000    Hypertension     Obstruction of eustachian tube     unspecified laterality Last assessed: 3/17/2014    Polycystic ovary syndrome     PONV (postoperative nausea and vomiting)     Subclinical hypothyroidism 01/06/2019    Symptomatic bradycardia     Syncope     Wears glasses      Past Surgical History:   Procedure Laterality Date    APPENDECTOMY      CARDIAC CATHETERIZATION      Description: Normal EF, No obstructive coronary artery disease, systolic hypertension..done at [facility] Resolved:  1998    CARDIAC PACEMAKER PLACEMENT Left      SECTION      COLONOSCOPY      DEBRIDEMENT TENNIS ELBOW      GANGLION CYST EXCISION Left     hand    HYSTERECTOMY      KNEE ARTHROSCOPY Right     [place] SURGICAL ARTHROSCOPY SHOULDER W/ROTATOR CUFF RPR Left 10/03/2016    Procedure: ARTHROSCOPY SHOULDER, ROTATOR CUFF REPAIR,SUBACROMIAL DECOMPRESSION ; MICROFRACTURE MIKAELA GLENOID HEAD;  Surgeon: Evens Joseph MD;  Location: [facility];  Service: Orthopedics    TONSILLECTOMY      TUBAL LIGATION      UPPER GASTROINTESTINAL ENDOSCOPY      WISDOM TOOTH EXTRACTION       Social History   Social History     Substance and Sexual Activity   Alcohol Use Yes    Comment: at ocassions only     Social History     Substance and Sexual Activity   Drug Use No     Social History     Tobacco Use   Smoking Status Never    Passive exposure: Never   Smokeless Tobacco Never     Family History:   Family History   Problem Relation Age of Onset    Breast cancer Mother 74    Ovarian cancer Mother 82    Multiple myeloma Mother     Cancer Mother         Breast, Ovarian    Liver cancer Father 73    Hypertension Father     Diabetes Father     Arthritis Father             Cancer Father         Liver cancer    No Known Problems Sister     No Known Problems Sister     No Known Problems Sister     No Known Problems Sister     No Known Problems Daughter     No Known Problems Daughter     Colon cancer Maternal Grandmother 72    Thyroid cancer Maternal Grandfather 78    No Known Problems Paternal Grandmother     No Known Problems Paternal Grandfather     No Known Problems Maternal Aunt     No Known Problems Paternal Aunt     Liver cancer Paternal Aunt 75    Allergies Family         bronchitis    Diabetes Family     Seizures Family     Heart disease Family     Hypertension Family     Skin cancer Family        Current Outpatient Medications on File Prior to Visit   Medication Sig Dispense Refill    aspirin 81 MG tablet Take 81 mg by mouth  daily      azelastine (ASTELIN) 0.1 % nasal spray 2 sprays into each nostril daily Use in each nostril as directed 1 mL 2    Cholecalciferol (Vitamin D3) 50 MCG (2000 UT) TABS 2 tabs daily 60 tablet 10    EPINEPHrine (EPIPEN) 0.3 mg/0.3 mL SOAJ Inject 0.3 mL (0.3 mg total) into a muscle as needed for anaphylaxis (twin pack) 2 each 0    FLUTICASONE PROPIONATE, NASAL, NA 2 sprays into each nostril as needed        levothyroxine 50 mcg tablet Take 1 tablet (50 mcg total) by mouth daily 90 tablet 3    loratadine (CLARITIN) 10 mg tablet Take 10 mg by mouth daily      metoprolol succinate (TOPROL-XL) 25 mg 24 hr tablet Take 1 tablet (25 mg total) by mouth daily 30 tablet 3    Omega-3 Fatty Acids (fish oil) 1,000 mg Take 4 capsules (4,000 mg total) by mouth daily 120 capsule 10    Red Yeast Rice 600 MG CAPS Take 2 capsules (1,200 mg total) by mouth 2 (two) times a day 120 capsule 10     No current facility-administered medications on file prior to visit.     Allergies   Allergen Reactions    Shellfish Allergy - Food Allergy Anaphylaxis    Shellfish-Derived Products - Food Allergy Anaphylaxis    Shrimp Flavor - Food Allergy Anaphylaxis    Darvon [Propoxyphene] Hives    Tramadol Hives    Ultram [Tramadol Hcl] Hives       Current Outpatient Medications on File Prior to Visit   Medication Sig Dispense Refill    aspirin 81 MG tablet Take 81 mg by mouth daily      azelastine (ASTELIN) 0.1 % nasal spray 2 sprays into each nostril daily Use in each nostril as directed 1 mL 2    Cholecalciferol (Vitamin D3) 50 MCG (2000 UT) TABS 2 tabs daily 60 tablet 10    EPINEPHrine (EPIPEN) 0.3 mg/0.3 mL SOAJ Inject 0.3 mL (0.3 mg total) into a muscle as needed for anaphylaxis (twin pack) 2 each 0    FLUTICASONE PROPIONATE, NASAL, NA 2 sprays into each nostril as needed        levothyroxine 50 mcg tablet Take 1 tablet (50 mcg total) by mouth daily 90 tablet 3    loratadine (CLARITIN) 10 mg tablet Take 10 mg by mouth daily      metoprolol  "succinate (TOPROL-XL) 25 mg 24 hr tablet Take 1 tablet (25 mg total) by mouth daily 30 tablet 3    Omega-3 Fatty Acids (fish oil) 1,000 mg Take 4 capsules (4,000 mg total) by mouth daily 120 capsule 10    Red Yeast Rice 600 MG CAPS Take 2 capsules (1,200 mg total) by mouth 2 (two) times a day 120 capsule 10     No current facility-administered medications on file prior to visit.       Objective   Vitals: Blood pressure 146/84, pulse 65, height 5' 7\" (1.702 m), weight 117 kg (258 lb 6.4 oz), not currently breastfeeding.,Body mass index is 40.47 kg/m².    PE:  AAOx 3  WDWN  Hearing intact, no drainage from eyes  Regular rate  no audible wheezing  no abdominal distension  LE compartments soft, skin intact    rightknee:    Appearance:  no swelling   No ecchymosis  no obvious joint deformity   No effusion  Palpation/Tenderness:  +TTP over medial joint line  No TTP over lateral joint line   No TTP over patella  No TTP over patellar tendon  No TTP over pes anserine bursa  Active Range of Motion:  AROM: 0-105  Special Tests:  Medial Ning's Test:  Positive  Lateral Ning's Test:  Negative  Apley's compression test:  Negative  Lachman's Test:  negative  Anterior Drawer Test:  Negative  Patellar grind:  Negative  Valgus Stress Test:  negative  Varus Stress Test:  negative     No ipsilateral hip pain with ROM    rightLE:    Sensation grossly intact  Palpable 2+ pulse  AT/GS/EHL intact    Imaging Studies: I have personally reviewed pertinent films in PACS  XR rightknee:  mild right knee arthritis        Scribe Attestation      I,:  Ronnie Mclaughlin am acting as a scribe while in the presence of the attending physician.:       I,:  Jazz Elizabeth DO personally performed the services described in this documentation    as scribed in my presence.:               "

## 2024-03-29 ENCOUNTER — EVALUATION (OUTPATIENT)
Dept: PHYSICAL THERAPY | Facility: MEDICAL CENTER | Age: 64
End: 2024-03-29
Payer: COMMERCIAL

## 2024-03-29 DIAGNOSIS — M17.11 ARTHRITIS OF RIGHT KNEE: ICD-10-CM

## 2024-03-29 DIAGNOSIS — M23.91 INTERNAL DERANGEMENT OF RIGHT KNEE: ICD-10-CM

## 2024-03-29 DIAGNOSIS — M25.561 RIGHT KNEE PAIN, UNSPECIFIED CHRONICITY: Primary | ICD-10-CM

## 2024-03-29 PROCEDURE — 97161 PT EVAL LOW COMPLEX 20 MIN: CPT | Performed by: PHYSICAL THERAPIST

## 2024-03-29 NOTE — PROGRESS NOTES
PT Evaluation     Today's date: 3/29/2024  Patient name: Carol Silva  : 1960  MRN: [id number]  Referring provider: Jazz Elizabeth,*  Dx:   Encounter Diagnosis     ICD-10-CM    1. Right knee pain, unspecified chronicity  M25.561 Ambulatory referral to Physical Therapy      2. Internal derangement of right knee  M23.91 Ambulatory referral to Physical Therapy      3. Arthritis of right knee  M17.11 Ambulatory referral to Physical Therapy                     Assessment  Assessment details: Carol Silva is a 63 y.o. female was evaluated on 3/29/2024  for Right knee pain, unspecified chronicity  (primary encounter diagnosis)  Internal derangement of right knee  Arthritis of right knee. Carol Silva has the above listed impairments resulting in functional deficits and negative impact to quality of life.  Patient is appropriate for skilled PT intervention to promote maximal return to function and patient specific goals.      Patient agrees with outlined treatment plan and all questions were answered to their satisfaction.    Advised to get compression sleeve as well for residual swelling causing quad inhibitation and tightness       Impairments: abnormal muscle firing, abnormal muscle tone, abnormal or restricted ROM, impaired physical strength, lacks appropriate home exercise program and pain with function  Understanding of Dx/Px/POC: good   Prognosis: good    Goals  Patient will successfully transition to home exercise program.  Patient will be able to manage symptoms independently.    Carol will report no limitation in getting up from floor in half kneel  Carol will regain full knee flexion     Plan  Patient would benefit from: skilled PT  Referral necessary: No  Planned modality interventions: thermotherapy: hydrocollator packs  Planned therapy interventions: home exercise program, manual therapy, neuromuscular re-education, patient education, functional ROM exercises, strengthening, stretching, joint  mobilization, graded activity, graded exercise, therapeutic exercise, body mechanics training, motor coordination training and activity modification  Frequency: 1x week  Duration in weeks: 12  Treatment plan discussed with: patient        Subjective Evaluation    History of Present Illness  Mechanism of injury: Carol Silva is  a 63 y.o female presenting to therapy with complaints of R knee pain.  Pain began on 3/9 after kneeling on her knee.  She felt pop, felt immediate pain and swelling occurred . She saw orthopedics with only mild OA noted.  She notes some giving out or unsteadiness on knee, no locking but does not clicking.  She feels like she is unable to bend knee fully and cannot rise from kneeling position without upper body assist.     Patient Goals  Patient goals for therapy: decreased pain, increased motion, independence with ADLs/IADLs, increased strength and return to sport/leisure activities    Pain  Current pain ratin  At best pain ratin  At worst pain ratin  Quality: dull ache, discomfort, pressure and tight          Objective     Observations     Right Knee   Positive for effusion.     Additional Observation Details  Joint line 53cm on R, 48 on L       Palpation     Additional Palpation Details  No palpable tenderness     Neurological Testing     Sensation     Knee   Left Knee   Intact: Light touch    Right Knee   Intact: light touch     Active Range of Motion   Left Knee   Flexion: 125 degrees     Right Knee   Flexion: 110 degrees   Extension: 0 degrees     Strength/Myotome Testing     Left Knee   Flexion: 4+  Prone flexion: 4+  Extension: 4+  Quadriceps contraction: good    Right Knee   Flexion: 4-  Prone flexion: 4-  Extension: 4-  Quadriceps contraction: fair             Precautions: None      Manuals 3/29                                                                Neuro Re-Ed                                                                                                         Ther Ex             Prone quad set              SLR                                                                                           Ther Activity                                       Gait Training                                       Modalities

## 2024-04-24 ENCOUNTER — REMOTE DEVICE CLINIC VISIT (OUTPATIENT)
Dept: CARDIOLOGY CLINIC | Facility: CLINIC | Age: 64
End: 2024-04-24
Payer: COMMERCIAL

## 2024-04-24 DIAGNOSIS — Z95.0 CARDIAC PACEMAKER IN SITU: Primary | ICD-10-CM

## 2024-04-24 PROCEDURE — 93294 REM INTERROG EVL PM/LDLS PM: CPT | Performed by: INTERNAL MEDICINE

## 2024-04-24 PROCEDURE — 93296 REM INTERROG EVL PM/IDS: CPT | Performed by: INTERNAL MEDICINE

## 2024-04-24 NOTE — PROGRESS NOTES
"Results for orders placed or performed in visit on 04/24/24   Cardiac EP device report    Narrative    MDT-DUAL CHAMBER PPM (AAI-DDD MODE) / NOT MRI CONDITIONAL  CARELINK TRANSMISSION: BATTERY STATUS \"9 YRS.\" AP 51%  1.6%. ALL AVAILABLE LEAD PARAMETERS WITHIN NORMAL LIMITS. NO SIGNIFICANT HIGH RATE EPISODES. RATE DROP MARKERS NOTED. NORMAL DEVICE FUNCTION. NC         "

## 2024-05-01 ENCOUNTER — OFFICE VISIT (OUTPATIENT)
Dept: OBGYN CLINIC | Facility: MEDICAL CENTER | Age: 64
End: 2024-05-01
Payer: COMMERCIAL

## 2024-05-01 VITALS
SYSTOLIC BLOOD PRESSURE: 115 MMHG | HEART RATE: 56 BPM | BODY MASS INDEX: 40.37 KG/M2 | DIASTOLIC BLOOD PRESSURE: 73 MMHG | WEIGHT: 257.2 LBS | HEIGHT: 67 IN

## 2024-05-01 DIAGNOSIS — M17.11 ARTHRITIS OF RIGHT KNEE: ICD-10-CM

## 2024-05-01 DIAGNOSIS — M25.561 RIGHT KNEE PAIN, UNSPECIFIED CHRONICITY: Primary | ICD-10-CM

## 2024-05-01 PROCEDURE — 99213 OFFICE O/P EST LOW 20 MIN: CPT | Performed by: ORTHOPAEDIC SURGERY

## 2024-05-01 NOTE — PROGRESS NOTES
Assessment/Plan:  1. Right knee pain, unspecified chronicity    2. Arthritis of right knee      No orders of the defined types were placed in this encounter.    The patient has mild right knee arthritis.  We discussed treatment options as well as risks and benefits of treatment options.  Patient will continue with current treatment plan and monitor  Will consider steroid injection or CT arthrogram if pain persists or worsens.  We discussed timing of these options relative to each other.      Return if symptoms worsen or fail to improve.    I answered all of the patient's questions during the visit and provided education of the patient's condition during the visit.  The patient verbalized understanding of the information given and agrees with the plan.  This note was dictated using [employer] software.  It may contain errors including improperly dictated words.  Please contact physician directly for any questions.    Subjective   Chief Complaint:   Chief Complaint   Patient presents with    Right Knee - Follow-up       HPI  Carol Silva is a 64 y.o. female who presents for follow up for mild right knee osteoarthritis.  Patient was last seen 3/19/2024 where patient was given physical therapy referral and advised to continue activity as tolerated.  Patient states today overall she is doing well.  Patient states she has been able to go to physical therapy to go for 1 session.  Patient states her physical therapist thought her overall strength was good so he only had her go for 1 session and was given a home exercise program.  Patient states she noticed her swelling has decreased within her right knee and states she noticed her pain decreasing within the past week.  Patient states she does get occasional pain along the medial aspect with pivoting motions but states it is tolerable.  Patient states 2 weeks ago she had an episode where her knee gave out on her but that was the only time since last visit.  Patient states she  is not taking any medication for pain as pain has been tolerable for her.  Patient is overall pleased with her progress.      Review of Systems  ROS:    See HPI for musculoskeletal review.   All other systems reviewed are negative     History:  Past Medical History:   Diagnosis Date    Abnormal blood chemistry     last assessed: 2014    Allergic     Allergic reaction     last assessed: 2012    Allergic rhinitis     last assessed: 2014    Arthralgia of left shoulder region     last assessed: 12/3/2013    Arthritis     Bradycardia     Disease of thyroid gland     Dysautonomia (HCC)     Elevated blood pressure reading without diagnosis of hypertension     last assessed: 2016    Endometriosis     Familial combined hyperlipidemia     last assessed: 2014    Female infertility     Fibroid     Functional murmur     description: soft systolic murmur - no valvular disease on 2D echo Last assessed: 2014    Heart murmur 2000    Hypertension     Obstruction of eustachian tube     unspecified laterality Last assessed: 3/17/2014    Polycystic ovary syndrome     PONV (postoperative nausea and vomiting)     Subclinical hypothyroidism 2019    Symptomatic bradycardia     Syncope     Wears glasses      Past Surgical History:   Procedure Laterality Date    APPENDECTOMY      CARDIAC CATHETERIZATION      Description: Normal EF, No obstructive coronary artery disease, systolic hypertension..done at [facility] Resolved: 1998    CARDIAC PACEMAKER PLACEMENT Left      SECTION      COLONOSCOPY      DEBRIDEMENT TENNIS ELBOW      GANGLION CYST EXCISION Left     hand    HYSTERECTOMY      KNEE ARTHROSCOPY Right     [place] SURGICAL ARTHROSCOPY SHOULDER W/ROTATOR CUFF RPR Left 10/03/2016    Procedure: ARTHROSCOPY SHOULDER, ROTATOR CUFF REPAIR,SUBACROMIAL DECOMPRESSION ; MICROFRACTURE MIKAELA GLENOID HEAD;  Surgeon: Evens Joseph MD;  Location: [facility] OR;  Service: Orthopedics    TONSILLECTOMY       TUBAL LIGATION      UPPER GASTROINTESTINAL ENDOSCOPY      WISDOM TOOTH EXTRACTION       Social History   Social History     Substance and Sexual Activity   Alcohol Use Yes    Comment: at ocassions only     Social History     Substance and Sexual Activity   Drug Use No     Social History     Tobacco Use   Smoking Status Never    Passive exposure: Never   Smokeless Tobacco Never     Family History:   Family History   Problem Relation Age of Onset    Breast cancer Mother 74    Ovarian cancer Mother 82    Multiple myeloma Mother     Cancer Mother         Breast, Ovarian    Liver cancer Father 73    Hypertension Father     Diabetes Father     Arthritis Father             Cancer Father         Liver cancer    No Known Problems Sister     No Known Problems Sister     No Known Problems Sister     No Known Problems Sister     No Known Problems Daughter     No Known Problems Daughter     Colon cancer Maternal Grandmother 72    Thyroid cancer Maternal Grandfather 78    No Known Problems Paternal Grandmother     No Known Problems Paternal Grandfather     No Known Problems Maternal Aunt     No Known Problems Paternal Aunt     Liver cancer Paternal Aunt 75    Allergies Family         bronchitis    Diabetes Family     Seizures Family     Heart disease Family     Hypertension Family     Skin cancer Family        Current Outpatient Medications on File Prior to Visit   Medication Sig Dispense Refill    aspirin 81 MG tablet Take 81 mg by mouth daily      azelastine (ASTELIN) 0.1 % nasal spray 2 sprays into each nostril daily Use in each nostril as directed 1 mL 2    Cholecalciferol (Vitamin D3) 50 MCG ( UT) TABS 2 tabs daily 60 tablet 10    EPINEPHrine (EPIPEN) 0.3 mg/0.3 mL SOAJ Inject 0.3 mL (0.3 mg total) into a muscle as needed for anaphylaxis (twin pack) 2 each 0    FLUTICASONE PROPIONATE, NASAL, NA 2 sprays into each nostril as needed        levothyroxine 50 mcg tablet Take 1 tablet (50 mcg total) by mouth daily  "90 tablet 3    loratadine (CLARITIN) 10 mg tablet Take 10 mg by mouth daily      metoprolol succinate (TOPROL-XL) 25 mg 24 hr tablet Take 1 tablet (25 mg total) by mouth daily 30 tablet 3    Omega-3 Fatty Acids (fish oil) 1,000 mg Take 4 capsules (4,000 mg total) by mouth daily 120 capsule 10    Red Yeast Rice 600 MG CAPS Take 2 capsules (1,200 mg total) by mouth 2 (two) times a day 120 capsule 10     No current facility-administered medications on file prior to visit.     Allergies   Allergen Reactions    Shellfish Allergy - Food Allergy Anaphylaxis    Shellfish-Derived Products - Food Allergy Anaphylaxis    Shrimp Flavor - Food Allergy Anaphylaxis    Darvon [Propoxyphene] Hives    Tramadol Hives    Ultram [Tramadol Hcl] Hives        Objective     /73   Pulse 56   Ht 5' 7\" (1.702 m)   Wt 117 kg (257 lb 3.2 oz)   LMP  (LMP Unknown)   BMI 40.28 kg/m²      PE:  AAOx 3  WDWN  Hearing intact, no drainage from eyes  no audible wheezing  no abdominal distension  LE compartments soft, skin intact    Ortho Exam:  right Knee:   No erythema  no swelling  no effusion  no warmth  AROM: 0-110  Stable to varus/valgus stress  TTP medial joint line      Scribe Attestation      I,:  Ronnie Mclaughlin am acting as a scribe while in the presence of the attending physician.:       I,:  Jazz Elizabeth DO personally performed the services described in this documentation    as scribed in my presence.:                       "

## 2024-05-16 DIAGNOSIS — I47.10 SVT (SUPRAVENTRICULAR TACHYCARDIA): ICD-10-CM

## 2024-05-22 DIAGNOSIS — I47.10 SVT (SUPRAVENTRICULAR TACHYCARDIA): ICD-10-CM

## 2024-05-22 RX ORDER — METOPROLOL SUCCINATE 25 MG/1
25 TABLET, EXTENDED RELEASE ORAL DAILY
Qty: 30 TABLET | Refills: 0 | Status: SHIPPED | OUTPATIENT
Start: 2024-05-22 | End: 2024-05-22 | Stop reason: SDUPTHER

## 2024-05-22 RX ORDER — METOPROLOL SUCCINATE 25 MG/1
25 TABLET, EXTENDED RELEASE ORAL DAILY
Qty: 30 TABLET | Refills: 11 | Status: SHIPPED | OUTPATIENT
Start: 2024-05-22

## 2024-05-22 NOTE — TELEPHONE ENCOUNTER
Requested medication(s) are due for refill today: Yes  Patient has already received a courtesy refill: No  Other reason request has been forwarded to provider: last visit 2021

## 2024-07-24 ENCOUNTER — APPOINTMENT (OUTPATIENT)
Dept: LAB | Facility: MEDICAL CENTER | Age: 64
End: 2024-07-24
Payer: COMMERCIAL

## 2024-07-24 ENCOUNTER — APPOINTMENT (OUTPATIENT)
Dept: LAB | Facility: CLINIC | Age: 64
End: 2024-07-24
Payer: COMMERCIAL

## 2024-07-24 DIAGNOSIS — E03.8 SUBCLINICAL HYPOTHYROIDISM: ICD-10-CM

## 2024-07-24 DIAGNOSIS — R73.09 ABNORMAL BLOOD SUGAR: ICD-10-CM

## 2024-07-24 DIAGNOSIS — Z11.59 NEED FOR HEPATITIS C SCREENING TEST: ICD-10-CM

## 2024-07-24 DIAGNOSIS — N18.30 STAGE 3 CHRONIC KIDNEY DISEASE, UNSPECIFIED WHETHER STAGE 3A OR 3B CKD (HCC): ICD-10-CM

## 2024-07-24 DIAGNOSIS — E78.2 MIXED HYPERLIPIDEMIA: ICD-10-CM

## 2024-07-24 DIAGNOSIS — Z13.0 SCREENING FOR DEFICIENCY ANEMIA: ICD-10-CM

## 2024-07-24 LAB
ALBUMIN SERPL BCG-MCNC: 3.9 G/DL (ref 3.5–5)
ALP SERPL-CCNC: 79 U/L (ref 34–104)
ALT SERPL W P-5'-P-CCNC: 14 U/L (ref 7–52)
ANION GAP SERPL CALCULATED.3IONS-SCNC: 10 MMOL/L (ref 4–13)
AST SERPL W P-5'-P-CCNC: 17 U/L (ref 13–39)
BASOPHILS # BLD AUTO: 0.07 THOUSANDS/ÂΜL (ref 0–0.1)
BASOPHILS NFR BLD AUTO: 1 % (ref 0–1)
BILIRUB SERPL-MCNC: 0.55 MG/DL (ref 0.2–1)
BUN SERPL-MCNC: 17 MG/DL (ref 5–25)
CALCIUM SERPL-MCNC: 8.9 MG/DL (ref 8.4–10.2)
CHLORIDE SERPL-SCNC: 103 MMOL/L (ref 96–108)
CHOLEST SERPL-MCNC: 193 MG/DL
CO2 SERPL-SCNC: 26 MMOL/L (ref 21–32)
CREAT SERPL-MCNC: 0.93 MG/DL (ref 0.6–1.3)
EOSINOPHIL # BLD AUTO: 0.24 THOUSAND/ÂΜL (ref 0–0.61)
EOSINOPHIL NFR BLD AUTO: 4 % (ref 0–6)
ERYTHROCYTE [DISTWIDTH] IN BLOOD BY AUTOMATED COUNT: 13.6 % (ref 11.6–15.1)
EST. AVERAGE GLUCOSE BLD GHB EST-MCNC: 128 MG/DL
GFR SERPL CREATININE-BSD FRML MDRD: 66 ML/MIN/1.73SQ M
GLUCOSE P FAST SERPL-MCNC: 97 MG/DL (ref 65–99)
HBA1C MFR BLD: 6.1 %
HCT VFR BLD AUTO: 40.7 % (ref 34.8–46.1)
HCV AB SER QL: NORMAL
HDLC SERPL-MCNC: 48 MG/DL
HGB BLD-MCNC: 12.7 G/DL (ref 11.5–15.4)
IMM GRANULOCYTES # BLD AUTO: 0.02 THOUSAND/UL (ref 0–0.2)
IMM GRANULOCYTES NFR BLD AUTO: 0 % (ref 0–2)
LDLC SERPL CALC-MCNC: 125 MG/DL (ref 0–100)
LYMPHOCYTES # BLD AUTO: 1.75 THOUSANDS/ÂΜL (ref 0.6–4.47)
LYMPHOCYTES NFR BLD AUTO: 29 % (ref 14–44)
MCH RBC QN AUTO: 28.2 PG (ref 26.8–34.3)
MCHC RBC AUTO-ENTMCNC: 31.2 G/DL (ref 31.4–37.4)
MCV RBC AUTO: 90 FL (ref 82–98)
MONOCYTES # BLD AUTO: 0.43 THOUSAND/ÂΜL (ref 0.17–1.22)
MONOCYTES NFR BLD AUTO: 7 % (ref 4–12)
NEUTROPHILS # BLD AUTO: 3.61 THOUSANDS/ÂΜL (ref 1.85–7.62)
NEUTS SEG NFR BLD AUTO: 59 % (ref 43–75)
NRBC BLD AUTO-RTO: 0 /100 WBCS
PLATELET # BLD AUTO: 298 THOUSANDS/UL (ref 149–390)
PMV BLD AUTO: 10.7 FL (ref 8.9–12.7)
POTASSIUM SERPL-SCNC: 4.3 MMOL/L (ref 3.5–5.3)
PROT SERPL-MCNC: 7.2 G/DL (ref 6.4–8.4)
RBC # BLD AUTO: 4.51 MILLION/UL (ref 3.81–5.12)
SODIUM SERPL-SCNC: 139 MMOL/L (ref 135–147)
TRIGL SERPL-MCNC: 98 MG/DL
TSH SERPL DL<=0.05 MIU/L-ACNC: 2.23 UIU/ML (ref 0.45–4.5)
WBC # BLD AUTO: 6.12 THOUSAND/UL (ref 4.31–10.16)

## 2024-07-24 PROCEDURE — 84443 ASSAY THYROID STIM HORMONE: CPT

## 2024-07-24 PROCEDURE — 80061 LIPID PANEL: CPT

## 2024-07-24 PROCEDURE — 80053 COMPREHEN METABOLIC PANEL: CPT

## 2024-07-24 PROCEDURE — 83036 HEMOGLOBIN GLYCOSYLATED A1C: CPT

## 2024-07-24 PROCEDURE — 86803 HEPATITIS C AB TEST: CPT

## 2024-07-24 PROCEDURE — 85025 COMPLETE CBC W/AUTO DIFF WBC: CPT

## 2024-07-26 ENCOUNTER — REMOTE DEVICE CLINIC VISIT (OUTPATIENT)
Dept: CARDIOLOGY CLINIC | Facility: CLINIC | Age: 64
End: 2024-07-26
Payer: COMMERCIAL

## 2024-07-26 DIAGNOSIS — I49.5 SSS (SICK SINUS SYNDROME) (HCC): Primary | ICD-10-CM

## 2024-07-26 PROCEDURE — 93294 REM INTERROG EVL PM/LDLS PM: CPT | Performed by: INTERNAL MEDICINE

## 2024-07-26 PROCEDURE — 93296 REM INTERROG EVL PM/IDS: CPT | Performed by: INTERNAL MEDICINE

## 2024-07-26 NOTE — PROGRESS NOTES
Results for orders placed or performed in visit on 07/26/24   Cardiac EP device report    Narrative    MDT-DUAL CHAMBER PPM (AAI-DDD MODE) / NOT MRI CONDITIONAL  CARELINK TRANSMISSION: BATTERY VOLTAGE ADEQUATE (9.1 YRS). AP: 46.2%. : 2.3% (MVP-ON). ALL AVAILABLE LEAD PARAMETERS WITHIN NORMAL LIMITS. 10 FAST A&V EPISODES W/ AVAIL EGMS SHOWING SVT W/ -176 BPM,MAX DURATION 7 MINS (SOME EPISODES SHOW F RA OS). 1 AT/AF EPISODE W/ EGM SHOWING NOISE (MYOPOTENTIAL RA OS (HX OF SAME)). 498 RATE DROP RESPONSES DETECTED (HX OF SAME). PT TAKES METOPROLOL SUCC, ASA 81MG. EF: 55% (ECHO 1/10/20). NORMAL DEVICE FUNCTION. CH

## 2024-08-12 ENCOUNTER — OFFICE VISIT (OUTPATIENT)
Dept: FAMILY MEDICINE CLINIC | Facility: CLINIC | Age: 64
End: 2024-08-12
Payer: COMMERCIAL

## 2024-08-12 VITALS
DIASTOLIC BLOOD PRESSURE: 82 MMHG | WEIGHT: 256.8 LBS | HEIGHT: 67 IN | HEART RATE: 63 BPM | SYSTOLIC BLOOD PRESSURE: 130 MMHG | BODY MASS INDEX: 40.3 KG/M2 | OXYGEN SATURATION: 99 % | TEMPERATURE: 98.2 F

## 2024-08-12 DIAGNOSIS — E78.2 MIXED HYPERLIPIDEMIA: ICD-10-CM

## 2024-08-12 DIAGNOSIS — E03.8 OTHER SPECIFIED HYPOTHYROIDISM: ICD-10-CM

## 2024-08-12 DIAGNOSIS — E55.9 VITAMIN D DEFICIENCY: ICD-10-CM

## 2024-08-12 DIAGNOSIS — I47.10 SVT (SUPRAVENTRICULAR TACHYCARDIA): ICD-10-CM

## 2024-08-12 DIAGNOSIS — N18.30 STAGE 3 CHRONIC KIDNEY DISEASE, UNSPECIFIED WHETHER STAGE 3A OR 3B CKD (HCC): ICD-10-CM

## 2024-08-12 DIAGNOSIS — R73.09 ABNORMAL BLOOD SUGAR: ICD-10-CM

## 2024-08-12 DIAGNOSIS — E66.09 CLASS 2 OBESITY DUE TO EXCESS CALORIES WITHOUT SERIOUS COMORBIDITY WITH BODY MASS INDEX (BMI) OF 37.0 TO 37.9 IN ADULT: ICD-10-CM

## 2024-08-12 DIAGNOSIS — R00.1 SINUS BRADYCARDIA: ICD-10-CM

## 2024-08-12 DIAGNOSIS — Z00.00 WELL ADULT EXAM: Primary | ICD-10-CM

## 2024-08-12 PROCEDURE — 99396 PREV VISIT EST AGE 40-64: CPT | Performed by: FAMILY MEDICINE

## 2024-08-12 NOTE — PROGRESS NOTES
Ambulatory Visit  Name: Carol Silva      : 1960      MRN: [id number]  Encounter Provider: Dmitriy Holley DO  Encounter Date: 2024   Encounter department: [facility]    Assessment & Plan   1. Well adult exam  2. Class 2 obesity due to excess calories without serious comorbidity with body mass index (BMI) of 37.0 to 37.9 in adult  Assessment & Plan:  Weight 256, BMI 40.52.  Patient is lost 4 pounds since last visit.  She states she is trying to watch her diet, but she has not been exercising as much lately due to right knee problems.  She is looking for low impact exercises that she can do.  She does like to ride bike.  I did advise her that this is a very good activity and she should give it a try.  3. Sinus bradycardia  Assessment & Plan:  History of sinus bradycardia.  Status post pacemaker implantation due to syncopal episode in .  Patient subsequently had battery generator replaced in , and again in .  Recent pacemaker interrogation revealed increased SVT runs.  Cardiologist started metoprolol XL 25 on her.  She states she is still getting these runs.  Will refer back to cardiology for further suggestions  Orders:  -     Ambulatory Referral to Cardiology; Future  4. Stage 3 chronic kidney disease, unspecified whether stage 3a or 3b CKD (HCC)  Assessment & Plan:  Lab Results   Component Value Date    EGFR 66 2024    EGFR 50 2023    EGFR 54 2022    CREATININE 0.93 2024    CREATININE 1.17 2023    CREATININE 1.10 2022   GFR stable at 66.  Will continue to monitor  5. Other specified hypothyroidism  Assessment & Plan:  TSH from  was normal.  Doing well on levothyroxine 50 mcg daily.  6. Mixed hyperlipidemia  Assessment & Plan:  Cholesterol from  was 193, .  Continue to work on low-fat diet and exercise  7. Abnormal blood sugar  Assessment & Plan:  A1c from  was 6.1%, was 5.4%.  Continue to work on reduced  carb diet and exercise.  Will continue to follow  Orders:  -     Comprehensive metabolic panel; Future; Expected date: 02/01/2025  -     Hemoglobin A1C; Future; Expected date: 02/01/2025  8. Vitamin D deficiency  Assessment & Plan:  Continue OTC vitamin D and calcium supplementation.  Will repeat vitamin D level prior to next appointment  Orders:  -     Vitamin D 25 hydroxy; Future; Expected date: 02/01/2025  9. SVT (supraventricular tachycardia)  -     Ambulatory Referral to Cardiology; Future       Patient presents for recheck of chronic medical problems today.  And 64-year-old physical.  She has been having pain in her right knee for the past few months, otherwise is doing pretty well.  She tries to eat healthy.  But she has not been exercising as much due to knee problems.  She is a non-smoker.  She is compliant with prescribed medications.  She had labs done on July 24.  Exam today unremarkable.  Recommend continue to work on healthy diet and regular exercise.    Current with mammography  Last colonoscopy 2023 (next 2033)    Last tetanus booster 2022  Patient had Shingrix vaccination    Lab results from July 24 reviewed with patient.    6 months, CMP, A1c, vitamin D level prior      History of Present Illness     Patient presents for recheck of chronic medical problems today.  And 64-year-old physical.  She has been having pain in her right knee for the past few months, otherwise is doing pretty well.  She tries to eat healthy.  But she has not been exercising as much due to knee problems.  She is a non-smoker.  She is compliant with prescribed medications.  She had labs done on July 24.      Review of Systems   Constitutional:  Negative for chills and fever.   HENT:  Negative for ear pain and sore throat.    Eyes:  Negative for pain and visual disturbance.   Respiratory:  Negative for cough and shortness of breath.    Cardiovascular:  Negative for chest pain and palpitations.   Gastrointestinal:  Negative for  abdominal pain and vomiting.   Genitourinary:  Negative for dysuria and hematuria.   Musculoskeletal:  Positive for arthralgias. Negative for back pain.   Skin:  Negative for color change and rash.   Neurological:  Negative for seizures and syncope.   All other systems reviewed and are negative.    Past Medical History:   Diagnosis Date   • Abnormal blood chemistry     last assessed: 2014   • Allergic    • Allergic reaction     last assessed: 2012   • Allergic rhinitis     last assessed: 2014   • Arthralgia of left shoulder region     last assessed: 12/3/2013   • Arthritis    • Bradycardia    • Disease of thyroid gland    • Dysautonomia (HCC)    • Elevated blood pressure reading without diagnosis of hypertension     last assessed: 2016   • Endometriosis    • Familial combined hyperlipidemia     last assessed: 2014   • Female infertility    • Fibroid    • Functional murmur     description: soft systolic murmur - no valvular disease on 2D echo Last assessed: 2014   • Heart murmur    • Hypertension    • Obstruction of eustachian tube     unspecified laterality Last assessed: 3/17/2014   • Polycystic ovary syndrome    • PONV (postoperative nausea and vomiting)    • Subclinical hypothyroidism 2019   • Symptomatic bradycardia    • Syncope    • Wears glasses      Past Surgical History:   Procedure Laterality Date   • APPENDECTOMY     • CARDIAC CATHETERIZATION      Description: Normal EF, No obstructive coronary artery disease, systolic hypertension..done at [facility] Resolved: 1998   • CARDIAC PACEMAKER PLACEMENT Left    •  SECTION     • COLONOSCOPY     • DEBRIDEMENT TENNIS ELBOW     • GANGLION CYST EXCISION Left     hand   • HYSTERECTOMY     • KNEE ARTHROSCOPY Right    • FL SURGICAL ARTHROSCOPY SHOULDER W/ROTATOR CUFF RPR Left 10/03/2016    Procedure: ARTHROSCOPY SHOULDER, ROTATOR CUFF REPAIR,SUBACROMIAL DECOMPRESSION ; MICROFRACTURE MIKAELA GLENOID HEAD;   Surgeon: Evens Joseph MD;  Location: [facility];  Service: Orthopedics   • TONSILLECTOMY     • TUBAL LIGATION     • UPPER GASTROINTESTINAL ENDOSCOPY     • WISDOM TOOTH EXTRACTION       Family History   Problem Relation Age of Onset   • Breast cancer Mother 74   • Ovarian cancer Mother 82   • Multiple myeloma Mother    • Cancer Mother         Breast, Ovarian   • Liver cancer Father 73   • Hypertension Father    • Diabetes Father    • Arthritis Father            • Cancer Father         Liver cancer   • No Known Problems Sister    • No Known Problems Sister    • No Known Problems Sister    • No Known Problems Sister    • No Known Problems Daughter    • No Known Problems Daughter    • Colon cancer Maternal Grandmother 72   • Thyroid cancer Maternal Grandfather 78   • No Known Problems Paternal Grandmother    • No Known Problems Paternal Grandfather    • No Known Problems Maternal Aunt    • No Known Problems Paternal Aunt    • Liver cancer Paternal Aunt 75   • Allergies Family         bronchitis   • Diabetes Family    • Seizures Family    • Heart disease Family    • Hypertension Family    • Skin cancer Family      Social History     Tobacco Use   • Smoking status: Never     Passive exposure: Never   • Smokeless tobacco: Never   Vaping Use   • Vaping status: Never Used   Substance and Sexual Activity   • Alcohol use: Yes     Comment: at ocassions only   • Drug use: No   • Sexual activity: Yes     Partners: Male     Birth control/protection: Female Sterilization     Current Outpatient Medications on File Prior to Visit   Medication Sig   • aspirin 81 MG tablet Take 81 mg by mouth daily   • azelastine (ASTELIN) 0.1 % nasal spray 2 sprays into each nostril daily Use in each nostril as directed   • Cholecalciferol (Vitamin D3) 50 MCG ( UT) TABS 2 tabs daily   • EPINEPHrine (EPIPEN) 0.3 mg/0.3 mL SOAJ Inject 0.3 mL (0.3 mg total) into a muscle as needed for anaphylaxis (twin pack)   • FLUTICASONE PROPIONATE,  "NASAL, NA 2 sprays into each nostril as needed     • levothyroxine 50 mcg tablet Take 1 tablet (50 mcg total) by mouth daily   • loratadine (CLARITIN) 10 mg tablet Take 10 mg by mouth daily   • metoprolol succinate (TOPROL-XL) 25 mg 24 hr tablet Take 1 tablet (25 mg total) by mouth daily   • Omega-3 Fatty Acids (fish oil) 1,000 mg Take 4 capsules (4,000 mg total) by mouth daily   • Red Yeast Rice 600 MG CAPS Take 2 capsules (1,200 mg total) by mouth 2 (two) times a day     Allergies   Allergen Reactions   • Shellfish Allergy - Food Allergy Anaphylaxis   • Shellfish-Derived Products - Food Allergy Anaphylaxis   • Shrimp Flavor - Food Allergy Anaphylaxis   • Darvon [Propoxyphene] Hives   • Tramadol Hives   • Ultram [Tramadol Hcl] Hives     Immunization History   Administered Date(s) Administered   • COVID-19 Moderna Vac BIVALENT 12 Yr+ IM 0.5 ML 09/25/2022   • COVID-19 PFIZER VACCINE 0.3 ML IM 12/23/2020, 01/11/2021, 09/30/2021   • COVID-19 Pfizer mRNA vacc PF alfredo-sucrose 12 yr and older (Comirnaty) 09/27/2023   • COVID-19 Pfizer vac (Alfredo-sucrose, gray cap) 12 yr+ IM 04/16/2022   • INFLUENZA 10/01/2016, 10/02/2017, 10/30/2019, 10/20/2020, 09/23/2021, 10/03/2022, 10/02/2023   • Influenza Quadrivalent, 6-35 Months IM 10/02/2017   • Influenza, Quadrivalent (nasal) 10/01/2016   • Tdap 01/19/2022   • Zoster Vaccine Recombinant 07/17/2020, 10/19/2020     Objective     /82 (BP Location: Left arm, Patient Position: Sitting, Cuff Size: Large)   Pulse 63   Temp 98.2 °F (36.8 °C)   Ht 5' 6.75\" (1.695 m)   Wt 116 kg (256 lb 12.8 oz)   LMP  (LMP Unknown)   SpO2 99%   BMI 40.52 kg/m²     Physical Exam  Cardiovascular:      Rate and Rhythm: Normal rate and regular rhythm.      Heart sounds: Normal heart sounds.      Comments: Carotids: no bruits  Ext: no edema  Pulmonary:      Effort: Pulmonary effort is normal. No respiratory distress.      Breath sounds: No wheezing or rales.   Psychiatric:         Behavior: " Behavior normal.         Thought Content: Thought content normal.

## 2024-08-12 NOTE — ASSESSMENT & PLAN NOTE
A1c from July 24 was 6.1%, was 5.4%.  Continue to work on reduced carb diet and exercise.  Will continue to follow

## 2024-08-12 NOTE — ASSESSMENT & PLAN NOTE
History of sinus bradycardia.  Status post pacemaker implantation due to syncopal episode in 1998.  Patient subsequently had battery generator replaced in 2007, and again in 2020.  Recent pacemaker interrogation revealed increased SVT runs.  Cardiologist started metoprolol XL 25 on her.  She states she is still getting these runs.  Will refer back to cardiology for further suggestions

## 2024-08-12 NOTE — ASSESSMENT & PLAN NOTE
Weight 256, BMI 40.52.  Patient is lost 4 pounds since last visit.  She states she is trying to watch her diet, but she has not been exercising as much lately due to right knee problems.  She is looking for low impact exercises that she can do.  She does like to ride bike.  I did advise her that this is a very good activity and she should give it a try.

## 2024-08-12 NOTE — ASSESSMENT & PLAN NOTE
Lab Results   Component Value Date    EGFR 66 07/24/2024    EGFR 50 07/19/2023    EGFR 54 07/18/2022    CREATININE 0.93 07/24/2024    CREATININE 1.17 07/19/2023    CREATININE 1.10 07/18/2022   GFR stable at 66.  Will continue to monitor   Okperi per MD Garvin for pt. To drink water. Two cups of ice water provided.

## 2024-08-12 NOTE — ASSESSMENT & PLAN NOTE
Continue OTC vitamin D and calcium supplementation.  Will repeat vitamin D level prior to next appointment

## 2024-09-03 ENCOUNTER — HOSPITAL ENCOUNTER (OUTPATIENT)
Dept: MAMMOGRAPHY | Facility: MEDICAL CENTER | Age: 64
Discharge: HOME | End: 2024-09-03
Payer: COMMERCIAL

## 2024-09-03 VITALS — BODY MASS INDEX: 40.14 KG/M2 | HEIGHT: 67 IN | WEIGHT: 255.73 LBS

## 2024-09-03 DIAGNOSIS — Z12.31 VISIT FOR SCREENING MAMMOGRAM: ICD-10-CM

## 2024-09-03 PROCEDURE — 77067 SCR MAMMO BI INCL CAD: CPT

## 2024-09-03 PROCEDURE — 77063 BREAST TOMOSYNTHESIS BI: CPT

## 2024-09-19 PROBLEM — I47.10 PAROXYSMAL SVT (SUPRAVENTRICULAR TACHYCARDIA) (HCC): Status: ACTIVE | Noted: 2019-04-10

## 2024-09-19 NOTE — ASSESSMENT & PLAN NOTE
Noted on prior device checks as far back as 2020  Continuing despite compliance with Toprol 25 mg QD  There is room to increase Toprol to xxx  Avoid caffeine & alcohol, stay hydrated. Avoid decongestant medications.

## 2024-09-19 NOTE — PROGRESS NOTES
Cardiology Follow Up    [facility] [address] [address]  PHONE: [phone]  FAX: [phone]    Carol Silva  1960  [id number]    Assessment/Plan:  Paroxysmal SVT (supraventricular tachycardia)  Noted on prior device checks as far back as 2020  Despite compliance with Toprol 25 mg QD patient still with multiple runs on device check and symptomatic.  Add Digoxin 125 mcg QD. Pt prefers to wait until she has returned from vacation before starting the Dig. This is fine. Start Dig around 11/3 and check level around 11/11  Re-establish with EP for possible SVT ablation    Mixed hyperlipidemia  Takes red yeast for HLD  CV exercise also naturally reduces the cholesterol.    Sinus bradycardia  S/p pacemaker insertion, 1998 July device interrogation shows stable lead parameters. Noise noted on the device leads only on one day.    RTO at end of November with me and then in 6 months with Dr. Juan Pablo Godwin.    Interval History:   Caorl Silva is a 64 y.o. female with past medical history as below who presents to the office for follow-up regarding longstanding histroy of palpitations.  Patient reports despite compliance with Toprol since it was started in February she feels frequent palpitations few times a month sometimes lasting up to 10 minutes.  Mostly randomly at rest.  No other recent medication changes, dietary changes or inciting factors.  When she started on the Toprol back in February she did have a few weeks of orthostasis side effect which eventually resolved.  Patient denies significant lightheadedness or presyncope.  Patient denies chest pain, pressure or ZEPEDA.    Review of Systems   Constitutional: Negative for malaise/fatigue.   Cardiovascular:  Positive for palpitations. Negative for chest pain and dyspnea on exertion.   Respiratory:  Negative for shortness of breath.    Neurological:  Negative for dizziness and light-headedness.     Past Medical  History:   Diagnosis Date    Abnormal blood chemistry     last assessed: 2014    Allergic     Allergic reaction     last assessed: 2012    Allergic rhinitis     last assessed: 2014    Arthralgia of left shoulder region     last assessed: 12/3/2013    Arthritis     Bradycardia     Disease of thyroid gland     Dysautonomia (HCC)     Elevated blood pressure reading without diagnosis of hypertension     last assessed: 2016    Endometriosis     Familial combined hyperlipidemia     last assessed: 2014    Female infertility     Fibroid     Functional murmur     description: soft systolic murmur - no valvular disease on 2D echo Last assessed: 2014    Heart murmur 2000    Hypertension     Obstruction of eustachian tube     unspecified laterality Last assessed: 3/17/2014    Polycystic ovary syndrome     PONV (postoperative nausea and vomiting)     Subclinical hypothyroidism 2019    Symptomatic bradycardia     Syncope     Wears glasses       Past Surgical History:   Procedure Laterality Date    APPENDECTOMY      CARDIAC CATHETERIZATION      Description: Normal EF, No obstructive coronary artery disease, systolic hypertension..done at [facility] Resolved: 1998    CARDIAC PACEMAKER PLACEMENT Left      SECTION      COLONOSCOPY      DEBRIDEMENT TENNIS ELBOW      GANGLION CYST EXCISION Left     hand    HYSTERECTOMY      KNEE ARTHROSCOPY Right     CO SURGICAL ARTHROSCOPY SHOULDER W/ROTATOR CUFF RPR Left 10/03/2016    Procedure: ARTHROSCOPY SHOULDER, ROTATOR CUFF REPAIR,SUBACROMIAL DECOMPRESSION ; MICROFRACTURE MIKAELA GLENOID HEAD;  Surgeon: Evens Joseph MD;  Location: [facility];  Service: Orthopedics    TONSILLECTOMY      TUBAL LIGATION      UPPER GASTROINTESTINAL ENDOSCOPY      WISDOM TOOTH EXTRACTION        Family History   Problem Relation Age of Onset    Breast cancer Mother 74    Ovarian cancer Mother 82    Multiple myeloma Mother     Cancer Mother         Breast,  Ovarian    Liver cancer Father 73    Hypertension Father     Diabetes Father     Arthritis Father             Cancer Father         Liver cancer    No Known Problems Sister     No Known Problems Sister     No Known Problems Sister     No Known Problems Sister     No Known Problems Daughter     No Known Problems Daughter     Colon cancer Maternal Grandmother 72    Thyroid cancer Maternal Grandfather 78    No Known Problems Paternal Grandmother     No Known Problems Paternal Grandfather     No Known Problems Maternal Aunt     No Known Problems Paternal Aunt     Liver cancer Paternal Aunt 75    Allergies Family         bronchitis    Diabetes Family     Seizures Family     Heart disease Family     Hypertension Family     Skin cancer Family       Current Outpatient Medications:     aspirin 81 MG tablet, Take 81 mg by mouth daily, Disp: , Rfl:     azelastine (ASTELIN) 0.1 % nasal spray, 2 sprays into each nostril daily Use in each nostril as directed, Disp: 1 mL, Rfl: 2    Cholecalciferol (Vitamin D3) 50 MCG (2000 UT) TABS, 2 tabs daily, Disp: 60 tablet, Rfl: 10    digoxin (LANOXIN) 0.125 mg tablet, Take 1 tablet (125 mcg total) by mouth daily, Disp: 30 tablet, Rfl: 3    EPINEPHrine (EPIPEN) 0.3 mg/0.3 mL SOAJ, Inject 0.3 mL (0.3 mg total) into a muscle as needed for anaphylaxis (twin pack), Disp: 2 each, Rfl: 0    FLUTICASONE PROPIONATE, NASAL, NA, 2 sprays into each nostril as needed  , Disp: , Rfl:     levothyroxine 50 mcg tablet, Take 1 tablet (50 mcg total) by mouth daily, Disp: 90 tablet, Rfl: 3    loratadine (CLARITIN) 10 mg tablet, Take 10 mg by mouth daily, Disp: , Rfl:     metoprolol succinate (TOPROL-XL) 25 mg 24 hr tablet, Take 1 tablet (25 mg total) by mouth daily, Disp: 30 tablet, Rfl: 11    Omega-3 Fatty Acids (fish oil) 1,000 mg, Take 4 capsules (4,000 mg total) by mouth daily, Disp: 120 capsule, Rfl: 10    Red Yeast Rice 600 MG CAPS, Take 2 capsules (1,200 mg total) by mouth 2 (two) times a day,  "Disp: 120 capsule, Rfl: 10     Allergies   Allergen Reactions    Shellfish Allergy - Food Allergy Anaphylaxis    Shellfish-Derived Products - Food Allergy Anaphylaxis    Shrimp Flavor - Food Allergy Anaphylaxis    Darvon [Propoxyphene] Hives    Tramadol Hives    Ultram [Tramadol Hcl] Hives     Physical Exam:  Vitals:    09/25/24 0758   BP: 138/86   Pulse: (!) 52   SpO2: 99%     Vitals:    09/25/24 0758   Weight: 115 kg (253 lb)     Height: 5' 6.75\" (169.5 cm)   Body mass index is 39.92 kg/m².    Physical Exam  Vitals and nursing note reviewed.   Constitutional:       General: She is not in acute distress.  HENT:      Head: Normocephalic and atraumatic.      Nose: Nose normal.      Mouth/Throat:      Mouth: Mucous membranes are moist.   Eyes:      Conjunctiva/sclera: Conjunctivae normal.   Neck:      Vascular: No JVD.   Cardiovascular:      Rate and Rhythm: Regular rhythm. Bradycardia present.      Heart sounds: S1 normal and S2 normal. Murmur heard.   Pulmonary:      Breath sounds: No decreased breath sounds, wheezing, rhonchi or rales.   Abdominal:      General: There is no distension.   Musculoskeletal:         General: Normal range of motion.   Skin:     General: Skin is warm and dry.   Neurological:      Mental Status: She is alert and oriented to person, place, and time.   Psychiatric:         Behavior: Behavior normal.     Data:  Lab Results   Component Value Date    HDL 48 (L) 07/24/2024    LDLCALC 125 (H) 07/24/2024    TRIG 98 07/24/2024     Device interrogation: 7/26/24 BATTERY VOLTAGE ADEQUATE (9.1 YRS). AP: 46.2%. : 2.3% (MVP-ON). ALL AVAILABLE LEAD PARAMETERS WITHIN NORMAL LIMITS. 10 FAST A&V EPISODES W/ AVAIL EGMS SHOWING SVT W/ -176 BPM,MAX DURATION 7 MINS (SOME EPISODES SHOW F RA OS). 1 AT/AF EPISODE W/ EGM SHOWING NOISE (MYOPOTENTIAL RA OS (HX OF SAME)). 498 RATE DROP RESPONSES DETECTED (HX OF SAME).     ECG: Sinus bradycardia with first-degree AV block.  Heart rate 52 bpm.  Normal QRS and " QTc intervals.  Normal axis.  No infarct or ischemia.    Teodora Alcantar PA-C  [facility]

## 2024-09-19 NOTE — ASSESSMENT & PLAN NOTE
S/p pacemaker insertion, 1998 July device interrogation shows stable lead parameters. Noise noted on the device leads only on one day.

## 2024-09-25 ENCOUNTER — OFFICE VISIT (OUTPATIENT)
Dept: CARDIOLOGY CLINIC | Facility: CLINIC | Age: 64
End: 2024-09-25
Payer: COMMERCIAL

## 2024-09-25 VITALS
BODY MASS INDEX: 39.71 KG/M2 | WEIGHT: 253 LBS | HEART RATE: 52 BPM | DIASTOLIC BLOOD PRESSURE: 86 MMHG | OXYGEN SATURATION: 99 % | HEIGHT: 67 IN | SYSTOLIC BLOOD PRESSURE: 138 MMHG

## 2024-09-25 DIAGNOSIS — I47.10 PAROXYSMAL SVT (SUPRAVENTRICULAR TACHYCARDIA) (HCC): ICD-10-CM

## 2024-09-25 DIAGNOSIS — E78.2 MIXED HYPERLIPIDEMIA: Primary | ICD-10-CM

## 2024-09-25 DIAGNOSIS — I47.10 SVT (SUPRAVENTRICULAR TACHYCARDIA) (HCC): ICD-10-CM

## 2024-09-25 DIAGNOSIS — R00.1 SINUS BRADYCARDIA: ICD-10-CM

## 2024-09-25 PROCEDURE — 93000 ELECTROCARDIOGRAM COMPLETE: CPT | Performed by: PHYSICIAN ASSISTANT

## 2024-09-25 PROCEDURE — 99244 OFF/OP CNSLTJ NEW/EST MOD 40: CPT | Performed by: PHYSICIAN ASSISTANT

## 2024-09-25 RX ORDER — DIGOXIN 125 MCG
125 TABLET ORAL DAILY
Qty: 30 TABLET | Refills: 3 | Status: SHIPPED | OUTPATIENT
Start: 2024-09-25

## 2024-09-25 RX ORDER — METOPROLOL SUCCINATE 25 MG/1
25 TABLET, EXTENDED RELEASE ORAL DAILY
Qty: 30 TABLET | Refills: 11 | Status: SHIPPED | OUTPATIENT
Start: 2024-09-25

## 2024-09-25 NOTE — PATIENT INSTRUCTIONS
You can continue metoprolol.    Start Digoxin around Nov 3rd when you come home.    Get digoxin level checked about 1 week from then (11/11). No need to fast. Don't take the digoxin pill that morning until labs are completed.

## 2024-11-04 ENCOUNTER — REMOTE DEVICE CLINIC VISIT (OUTPATIENT)
Dept: CARDIOLOGY CLINIC | Facility: CLINIC | Age: 64
End: 2024-11-04
Payer: COMMERCIAL

## 2024-11-04 DIAGNOSIS — Z95.0 PRESENCE OF PERMANENT CARDIAC PACEMAKER: Primary | ICD-10-CM

## 2024-11-04 PROCEDURE — 93294 REM INTERROG EVL PM/LDLS PM: CPT | Performed by: INTERNAL MEDICINE

## 2024-11-04 PROCEDURE — 93296 REM INTERROG EVL PM/IDS: CPT | Performed by: INTERNAL MEDICINE

## 2024-11-04 NOTE — PROGRESS NOTES
Results for orders placed or performed in visit on 11/04/24   Cardiac EP device report    Narrative    MDT-DUAL CHAMBER PPM (AAI-DDD MODE) / NOT MRI CONDITIONAL  CARELINK TRANSMISSION: BATTERY VOLTAGE ADEQUATE. (8.7 YRS) AP 57%  2%. ALL AVAILABLE LEAD PARAMETERS WITHIN NORMAL LIMITS. NO SIGNIFICANT HIGH RATE EPISODES. 1 FAST A & V EPISODE DETECTED. 1 AT/AF EPISODE DETECTED, ATRIAL LEAD NOISE NOTED (HX OF THE SAME). NO AF. NORMAL DEVICE FUNCTION.---OBANDO

## 2024-11-07 ENCOUNTER — ANNUAL EXAM (OUTPATIENT)
Dept: GYNECOLOGY | Facility: CLINIC | Age: 64
End: 2024-11-07
Payer: COMMERCIAL

## 2024-11-07 VITALS
DIASTOLIC BLOOD PRESSURE: 76 MMHG | HEIGHT: 66 IN | BODY MASS INDEX: 40.27 KG/M2 | WEIGHT: 250.6 LBS | SYSTOLIC BLOOD PRESSURE: 118 MMHG

## 2024-11-07 DIAGNOSIS — N95.1 MENOPAUSAL VAGINAL DRYNESS: ICD-10-CM

## 2024-11-07 DIAGNOSIS — Z12.31 ENCOUNTER FOR SCREENING MAMMOGRAM FOR BREAST CANCER: ICD-10-CM

## 2024-11-07 DIAGNOSIS — Z01.419 WOMEN'S ANNUAL ROUTINE GYNECOLOGICAL EXAMINATION: Primary | ICD-10-CM

## 2024-11-07 PROBLEM — D25.1 INTRAMURAL LEIOMYOMA OF UTERUS: Status: RESOLVED | Noted: 2022-09-19 | Resolved: 2024-11-07

## 2024-11-07 PROCEDURE — S0612 ANNUAL GYNECOLOGICAL EXAMINA: HCPCS | Performed by: OBSTETRICS & GYNECOLOGY

## 2024-11-07 NOTE — PROGRESS NOTES
Assessment & Plan   Diagnoses and all orders for this visit:    Women's annual routine gynecological examination    Encounter for screening mammogram for breast cancer  -     Mammo screening bilateral w 3d and cad; Future    Menopausal vaginal dryness    1. yearly exam-Pap smear deferred, self breast awareness reviewed, calcium/vitamin D recommendations discussed, mammogram request given, colonoscopy up-to-date follow-up as per specialist.  2. status post abdominal supracervical hysterectomy-done by Dr. Renteria in  for fibroids, menorrhagia, and dysautonomia.  She has had no symptoms since that time.  Of note, cervix is difficult to visualize, flush against the vagina without lesions or cervicitis noted.  3. vaginal atrophy-mild changes noted on exam.  Patient denies any complaints.  Vaginal lubrication/moisturizer sheet given, to use as needed.  4. family history of breast cancer-noted in her mother.  Mammogram 9/3/2024 was negative.  Breast exam today was normal.  To follow, mammogram request given for 2025.  5.  History of hypothyroidism/hyperlipidemia/vitamin D [id number] as per treating doctor  6.  History of  x 3  7.  Elevated BMI-diet/exercise  8.  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  Has taken a less stressful position with part-time work recently.  She is very happy with this.  Follow-up 1 year for yearly exam or as needed.    Subjective   Patient ID: Carol Silva is a 64 y.o. female.    Vitals:    24 1150   BP: 118/76     HPI    The following portions of the patient's history were reviewed and updated as appropriate: allergies, current medications, past family history, past medical history, past social history, past surgical history, and problem list.  Past Medical History:   Diagnosis Date    Abnormal blood chemistry     last assessed: 2014    Allergic     Allergic reaction     last assessed: 2012    Allergic rhinitis     last  assessed: 2014    Arthralgia of left shoulder region     last assessed: 12/3/2013    Arthritis     Bradycardia     Disease of thyroid gland     Dysautonomia (HCC)     Elevated blood pressure reading without diagnosis of hypertension     last assessed: 2016    Endometriosis     Familial combined hyperlipidemia     last assessed: 2014    Female infertility     Fibroid     Functional murmur     description: soft systolic murmur - no valvular disease on 2D echo Last assessed: 2014    Heart murmur 2000    Hypertension     Obstruction of eustachian tube     unspecified laterality Last assessed: 3/17/2014    Polycystic ovary syndrome     PONV (postoperative nausea and vomiting)     Subclinical hypothyroidism 2019    Symptomatic bradycardia     Syncope     Wears glasses      Past Surgical History:   Procedure Laterality Date    APPENDECTOMY      CARDIAC CATHETERIZATION      Description: Normal EF, No obstructive coronary artery disease, systolic hypertension..done at [facility] Resolved: 1998    CARDIAC PACEMAKER PLACEMENT Left      SECTION      COLONOSCOPY      DEBRIDEMENT TENNIS ELBOW      GANGLION CYST EXCISION Left     hand    HYSTERECTOMY      KNEE ARTHROSCOPY Right     KY SURGICAL ARTHROSCOPY SHOULDER W/ROTATOR CUFF RPR Left 10/03/2016    Procedure: ARTHROSCOPY SHOULDER, ROTATOR CUFF REPAIR,SUBACROMIAL DECOMPRESSION ; MICROFRACTURE MIKAELA GLENOID HEAD;  Surgeon: Evens Joseph MD;  Location: [facility];  Service: Orthopedics    TONSILLECTOMY      TUBAL LIGATION      UPPER GASTROINTESTINAL ENDOSCOPY      WISDOM TOOTH EXTRACTION       OB History    Para Term  AB Living   5 4 3 1 1 3   SAB IAB Ectopic Multiple Live Births   1       3      # Outcome Date GA Lbr Ty/2nd Weight Sex Type Anes PTL Lv   5 Term            4 Term            3 Term            2             1 SAB                Current Outpatient Medications:     aspirin 81 MG tablet, Take 81  mg by mouth daily, Disp: , Rfl:     azelastine (ASTELIN) 0.1 % nasal spray, 2 sprays into each nostril daily Use in each nostril as directed, Disp: 1 mL, Rfl: 2    Cholecalciferol (Vitamin D3) 50 MCG (2000 UT) TABS, 2 tabs daily, Disp: 60 tablet, Rfl: 10    digoxin (LANOXIN) 0.125 mg tablet, Take 1 tablet (125 mcg total) by mouth daily, Disp: 30 tablet, Rfl: 3    EPINEPHrine (EPIPEN) 0.3 mg/0.3 mL SOAJ, Inject 0.3 mL (0.3 mg total) into a muscle as needed for anaphylaxis (twin pack), Disp: 2 each, Rfl: 0    FLUTICASONE PROPIONATE, NASAL, NA, 2 sprays into each nostril as needed  , Disp: , Rfl:     levothyroxine 50 mcg tablet, Take 1 tablet (50 mcg total) by mouth daily, Disp: 90 tablet, Rfl: 3    loratadine (CLARITIN) 10 mg tablet, Take 10 mg by mouth daily, Disp: , Rfl:     metoprolol succinate (TOPROL-XL) 25 mg 24 hr tablet, Take 1 tablet (25 mg total) by mouth daily, Disp: 30 tablet, Rfl: 11    Omega-3 Fatty Acids (fish oil) 1,000 mg, Take 4 capsules (4,000 mg total) by mouth daily, Disp: 120 capsule, Rfl: 10    Red Yeast Rice 600 MG CAPS, Take 2 capsules (1,200 mg total) by mouth 2 (two) times a day, Disp: 120 capsule, Rfl: 10  Allergies   Allergen Reactions    Shellfish Allergy - Food Allergy Anaphylaxis    Shellfish-Derived Products - Food Allergy Anaphylaxis    Shrimp Flavor - Food Allergy Anaphylaxis    Darvon [Propoxyphene] Hives    Tramadol Hives    Ultram [Tramadol Hcl] Hives     Social History     Socioeconomic History    Marital status: /Civil Union     Spouse name: None    Number of children: 3    Years of education: None    Highest education level: None   Occupational History    None   Tobacco Use    Smoking status: Never     Passive exposure: Never    Smokeless tobacco: Never   Vaping Use    Vaping status: Never Used   Substance and Sexual Activity    Alcohol use: Yes     Comment: at ocassions only    Drug use: No    Sexual activity: Yes     Partners: Male     Birth control/protection: Female  "Sterilization   Other Topics Concern    None   Social History Narrative    Living independently with spouse    Caffeine use      Social Determinants of Health     Financial Resource Strain: Not on file   Food Insecurity: Not on file   Transportation Needs: Not on file   Physical Activity: Not on file   Stress: Not on file   Social Connections: Not on file   Intimate Partner Violence: Not on file   Housing Stability: Not on file     Family History   Problem Relation Age of Onset    Breast cancer Mother 74    Ovarian cancer Mother 82    Multiple myeloma Mother     Cancer Mother         Breast, Ovarian    Liver cancer Father 73    Hypertension Father     Diabetes Father     Arthritis Father             Cancer Father         Liver cancer    No Known Problems Sister     No Known Problems Sister     No Known Problems Sister     No Known Problems Sister     No Known Problems Daughter     No Known Problems Daughter     Colon cancer Maternal Grandmother 72    Thyroid cancer Maternal Grandfather 78    No Known Problems Paternal Grandmother     No Known Problems Paternal Grandfather     No Known Problems Maternal Aunt     No Known Problems Paternal Aunt     Liver cancer Paternal Aunt 75    Allergies Family         bronchitis    Diabetes Family     Seizures Family     Heart disease Family     Hypertension Family     Skin cancer Family        Review of Systems    Objective   Physical Exam  OBGyn Exam     Objective      /76 (BP Location: Right arm, Patient Position: Sitting)   Ht 5' 5.75\" (1.67 m)   Wt 114 kg (250 lb 9.6 oz)   LMP  (LMP Unknown)   BMI 40.76 kg/m²     General:   alert and oriented, in no acute distress   Neck: normal to inspection and palpation   Breast: normal appearance, no masses or tenderness   Heart:    Lungs:    Abdomen: soft, non-tender, without masses or organomegaly   Vulva: normal   Vagina: Without erythema or lesions or discharge.  Normal with mild vaginal atrophy   Cervix: Difficult " to visualize, flush against vagina without lesions or discharge or cervicitis.   Uterus: surgically absent   Adnexa: no mass, fullness, tenderness   Rectum: negative    Psych:  Normal mood and affect   Skin:  Without obvious lesions   Eyes: symmetric, with normal movements and reactivity   Musculoskeletal:  Normal muscle tone and movements appreciated

## 2024-11-11 ENCOUNTER — APPOINTMENT (OUTPATIENT)
Dept: LAB | Facility: CLINIC | Age: 64
End: 2024-11-11
Payer: COMMERCIAL

## 2024-11-11 DIAGNOSIS — I47.10 SVT (SUPRAVENTRICULAR TACHYCARDIA) (HCC): ICD-10-CM

## 2024-11-11 LAB — DIGOXIN SERPL-MCNC: 0.4 NG/ML (ref 0.8–2)

## 2024-11-11 PROCEDURE — 36415 COLL VENOUS BLD VENIPUNCTURE: CPT

## 2024-11-11 PROCEDURE — 80162 ASSAY OF DIGOXIN TOTAL: CPT

## 2024-11-19 ENCOUNTER — RESULTS FOLLOW-UP (OUTPATIENT)
Dept: NON INVASIVE DIAGNOSTICS | Facility: HOSPITAL | Age: 64
End: 2024-11-19

## 2024-11-20 NOTE — PROGRESS NOTES
"   Cardiology Follow Up    [facility] [address] [address]  PHONE: [phone]  FAX: [phone]    Carol Silva  1960  [id number]    Assessment/Plan:  Paroxysmal SVT (supraventricular tachycardia)  Started Dig on 11/2/24. She had 1 episode of atrial tachycardia that lasted 1 min 30 seconds around 5 PM on Nov 11th.   Continue Toprol & Dig.  Dig level ok  EP appt scheduled for 12/30 to discuss possible SVT Ablation     Mixed hyperlipidemia  Takes red yeast for HLD  She is back to daily CV exercise which also naturally reduces the cholesterol.     Sinus bradycardia  S/p pacemaker insertion, 1998 Nov device interrogation shows stable lead parameters.     RTO in 6 months with Dr. Juan Pablo Godwin.    Interval History:   Carol Silva is a 64 y.o. female with past medical history as below who presents to the office for follow-up regarding longstanding history of palpitations.  Patient reports the frequency of her palpitations has decreased since starting dig.  If she thinks she feels palpitations she will check her heart rate and sometimes it will be ~ 100 bpm.  No fast rate episodes documented at home.  A few days after starting digoxin there was 1 episode of dizziness otherwise no dizziness or lightheadedness.  No presyncope or syncope.  Patient denies chest pain or ZEPEDA.    I personally reviewed this patient's past medical history, surgical history, family history, social history, home medications and allergies.    Physical Exam:  Vitals:    11/22/24 0921   BP: 140/70   Pulse: (!) 54     Vitals:    11/22/24 0921   Weight: 117 kg (257 lb 12.8 oz)     Height: 5' 5.75\" (167 cm)   Body mass index is 41.93 kg/m².    Physical Exam  Vitals and nursing note reviewed.   Constitutional:       General: She is not in acute distress.  HENT:      Head: Normocephalic and atraumatic.      Nose: No congestion.      Mouth/Throat:      Mouth: Mucous membranes are moist.   Eyes:     "  Conjunctiva/sclera: Conjunctivae normal.   Cardiovascular:      Rate and Rhythm: Normal rate and regular rhythm.      Heart sounds: S1 normal and S2 normal. Murmur heard.      Systolic murmur is present with a grade of 2/6.   Pulmonary:      Effort: Pulmonary effort is normal.   Abdominal:      General: Abdomen is flat.   Musculoskeletal:         General: No swelling.   Skin:     General: Skin is warm and dry.   Neurological:      Mental Status: She is alert and oriented to person, place, and time.   Psychiatric:         Behavior: Behavior normal.     Data:  Cardiac EP device report: 11/4/2024  BATTERY VOLTAGE ADEQUATE. (8.7 YRS) AP 57%  2%. ALL AVAILABLE LEAD PARAMETERS WITHIN NORMAL LIMITS. NO SIGNIFICANT HIGH RATE EPISODES. 1 FAST A & V EPISODE DETECTED. 1 AT/AF EPISODE DETECTED, ATRIAL LEAD NOISE NOTED (HX OF THE SAME). NO AF. NORMAL DEVICE FUNCTION.    Device check: 11/22/24; 2 AT/AF NOTED; 0% BURDEN; LONGEST 1.36 MINS. PAT VS PAF SHOULDN'T BE EXCLUDED. PT ON DIGOXIN, METO SUCC, & ASA. BATTERY VOLTAGE ADEQUATE-8 YRS. AP 52%  2%. ALL AVAILABLE LEAD PARAMETERS WITHIN NORMAL LIMITS. NORMAL DEVICE FUNCTION.     Teodora Alcantar PA-C  [facility]

## 2024-11-22 ENCOUNTER — RESULTS FOLLOW-UP (OUTPATIENT)
Dept: CARDIOLOGY CLINIC | Facility: CLINIC | Age: 64
End: 2024-11-22

## 2024-11-22 ENCOUNTER — OFFICE VISIT (OUTPATIENT)
Dept: CARDIOLOGY CLINIC | Facility: CLINIC | Age: 64
End: 2024-11-22
Payer: COMMERCIAL

## 2024-11-22 VITALS
HEIGHT: 66 IN | DIASTOLIC BLOOD PRESSURE: 70 MMHG | HEART RATE: 54 BPM | BODY MASS INDEX: 41.43 KG/M2 | WEIGHT: 257.8 LBS | SYSTOLIC BLOOD PRESSURE: 140 MMHG

## 2024-11-22 DIAGNOSIS — I47.10 PAROXYSMAL SVT (SUPRAVENTRICULAR TACHYCARDIA) (HCC): ICD-10-CM

## 2024-11-22 DIAGNOSIS — N18.30 STAGE 3 CHRONIC KIDNEY DISEASE (HCC): ICD-10-CM

## 2024-11-22 DIAGNOSIS — E78.2 MIXED HYPERLIPIDEMIA: ICD-10-CM

## 2024-11-22 DIAGNOSIS — R00.1 SINUS BRADYCARDIA: Primary | ICD-10-CM

## 2024-11-22 PROCEDURE — 99215 OFFICE O/P EST HI 40 MIN: CPT | Performed by: PHYSICIAN ASSISTANT

## 2024-12-30 ENCOUNTER — TELEPHONE (OUTPATIENT)
Dept: CARDIOLOGY CLINIC | Facility: CLINIC | Age: 64
End: 2024-12-30

## 2024-12-30 ENCOUNTER — OFFICE VISIT (OUTPATIENT)
Dept: CARDIOLOGY CLINIC | Facility: CLINIC | Age: 64
End: 2024-12-30
Payer: COMMERCIAL

## 2024-12-30 ENCOUNTER — PREP FOR PROCEDURE (OUTPATIENT)
Dept: CARDIOLOGY CLINIC | Facility: CLINIC | Age: 64
End: 2024-12-30

## 2024-12-30 VITALS
HEART RATE: 133 BPM | DIASTOLIC BLOOD PRESSURE: 72 MMHG | SYSTOLIC BLOOD PRESSURE: 140 MMHG | WEIGHT: 251 LBS | HEIGHT: 65 IN | BODY MASS INDEX: 41.82 KG/M2

## 2024-12-30 DIAGNOSIS — Z95.0 PRESENCE OF PERMANENT CARDIAC PACEMAKER: Primary | ICD-10-CM

## 2024-12-30 DIAGNOSIS — I47.10 SVT (SUPRAVENTRICULAR TACHYCARDIA) (HCC): Primary | ICD-10-CM

## 2024-12-30 DIAGNOSIS — R00.1 SINUS BRADYCARDIA: ICD-10-CM

## 2024-12-30 PROCEDURE — 99244 OFF/OP CNSLTJ NEW/EST MOD 40: CPT | Performed by: INTERNAL MEDICINE

## 2024-12-30 PROCEDURE — 93000 ELECTROCARDIOGRAM COMPLETE: CPT | Performed by: INTERNAL MEDICINE

## 2024-12-30 NOTE — LETTER
2024               CARDIAC ABLATION INSTRUCTIONS     Carol Silva   : 1960  MRN: [id number]  [address] [place] [id number]    Procedure: SVT ABLATION     Procedure Date: 25    Location: [facility]  Address: [address] [place] [address], [place] [address]        Labs to be done on 25: PT INR / CMP / CBC (FASTING 8 HOURS)    BLOOD THINNER INSTRUCTIONS (Coumadin / Warfarin / Pradaxa / Eliquis / Xarelto):     N/A    Medication Hold:     Metoprolol - Do not take for 4 days before the procedure.     Digoxin - Do not take for 4 days before the procedure.    Arrival Time: The Hospital will contact you the day prior to your procedure, usually between 4PM - 6PM to instruct you on the time and place to report. If you do not hear from a [facility]  by 5PM the evening prior to your procedure, please contact the [facility] at [phone].    DO NOT eat or drink ANYTHING after midnight the night prior to your procedure including gum & candy.     You may have a SIP of WATER with your morning medications, UNLESS ADVISE OTHERWISE.     Please notify us if you have been prescribed a NEW MEDICATION prior to your procedure or admitted to the hospital within 30 days.      If you develop a cold, sore throat, fever or any other illness prior to your procedure date, notify your surgeon immediately.    Arrange for a responsible adult to drive you to and from the hospital.    DO NOT stop taking Plavix or Aspirin unless advised otherwise.     If you are currently taking Fish Oil, Krill oil and/or Vitamin E please DO NOT TAKE FOR A WEEK PRIOR TO PROCEDURE.     If you are diabetic, DO NOT take any of your diabetic pills the morning of your procedure. Oral diabetic medications may include Glucophage, Prandin, Glyburide, Micronase, Avandia, Glucovance, Precose, Glynase, and Starlix.     Bring a list of daily medications, vitamins, minerals, herbals and nutritional supplements you take.  "Please include dosages and the times you take them each day.     If you are packing an overnight bag, pack minimal clothing, you will be given hospital sleepwear.   DO NOT bring money, valuables or jewelry. The wedding band is ok.     If you use CPAP machine, bring it to the hospital.      Bring your Photo ID and Insurance cards with you.       FAILURE TO FOLLOW ANY OF THESE INSTRUCTIONS COULD RESULT IN THE CANCELLATION OF YOUR PROCEDURE      Please call [phone] if you do not hear from the  by 5:00PM the night before your procedure.    All patients enter through ENTRANCE B.  Parking is available free of charge or park on Parking Deck B.        Thank you,   Barbara \"Rima\" Eligio    [facility]   [address] [place] [id number]  Teams: [phone]             "

## 2024-12-30 NOTE — PROGRESS NOTES
Cardiac Electrophysiology New Patient Visit    Carol Silva  1960  [id number]  [facility]  [address]  [address]  [facility] [place] [address] [phone]    hospitals   It was a pleasure to see Carol Silva in our arrhythmia clinic at [facility] on 1/9/2020. She is a 59 y.o. woman with sinus bradycardia s/p PPM, arthritis, morbid obesity, HTN  Presented to discuss management of her dyspnea.     Patient reported experiencing epigastric pain on christmas morning. Her symptoms worsened and she started having orthostatic episodes with shortness of breath. She stated that back in 2007 she experienced the same situation when her pacemaker had reached [facility]. However these symptoms were worse. She also felt dyspnea to walking 1/2 block and had noticed weight gain. She felt coughing every few minutes but did not have any phlegm. She denied LE edema or syncope. She had pacemaker placed originally after having syncopal episodes due to bradycardia.     She was noted to have pacemaker syndrome as the mode on her dual chamber device was switched to VVI 65 bpm.     She had generator change on 1/10/2020. She was seen afterward through a telemedicine visit. She reported doing well and denied symptoms.     Follow-up visit  5/11/2021:    She had felt palpitations at times but did not recall date and time. Otherwise denies any chest pain, shortness of breath, orthopnea, PND or syncope.     Interval history:  Carol presents for a follow-up visit.  She had episode of SVT noted on her device interrogation lasting about 90 seconds.  She is continued on Toprol and digoxin.  She now presents to discuss management of her SVT.    She feels palpitations occurring more frequently and also feels tired at times. She notes episodes can last hours. She was started on Toprol and digoxin but continues to have episodes.     She has now retired and works as part-time.     Patient Active  Problem List   Diagnosis    Mixed hyperlipidemia    Sinus bradycardia    Numbness and tingling of right thumb    Cervical radiculopathy    Abnormal blood sugar    Allergic rhinitis    Allergic reaction    Other specified hypothyroidism    Paroxysmal SVT (supraventricular tachycardia) (HCC)    Vitamin D deficiency    Hand pain, right    Herpes zoster without complication    Class 2 obesity due to excess calories without serious comorbidity with body mass index (BMI) of 37.0 to 37.9 in adult    Chronic right shoulder pain    Left hand pain    Stage 3 chronic kidney disease (HCC)    Ocular migraine    Family history of colonic polyps    Menopausal vaginal dryness     Past Medical History:   Diagnosis Date    Abnormal blood chemistry     last assessed: 6/9/2014    Allergic     Allergic reaction     last assessed: 9/17/2012    Allergic rhinitis     last assessed: 6/9/2014    Arthralgia of left shoulder region     last assessed: 12/3/2013    Arthritis     Bradycardia     Disease of thyroid gland     Dysautonomia (HCC)     Elevated blood pressure reading without diagnosis of hypertension     last assessed: 9/30/2016    Endometriosis     Familial combined hyperlipidemia     last assessed: 6/9/2014    Female infertility     Fibroid     Functional murmur     description: soft systolic murmur - no valvular disease on 2D echo Last assessed: 4/23/2014    Heart murmur 2000    Hypertension     Obstruction of eustachian tube     unspecified laterality Last assessed: 3/17/2014    Polycystic ovary syndrome     PONV (postoperative nausea and vomiting)     Subclinical hypothyroidism 01/06/2019    Symptomatic bradycardia     Syncope     Wears glasses      Social History     Socioeconomic History    Marital status: /Civil Union     Spouse name: Not on file    Number of children: 3    Years of education: Not on file    Highest education level: Not on file   Occupational History    Not on file   Tobacco Use    Smoking status: Never      Passive exposure: Never    Smokeless tobacco: Never   Vaping Use    Vaping status: Never Used   Substance and Sexual Activity    Alcohol use: Not Currently     Comment: at ocassions only    Drug use: No    Sexual activity: Yes     Partners: Male     Birth control/protection: Female Sterilization   Other Topics Concern    Not on file   Social History Narrative    Living independently with spouse    Caffeine use      Social Drivers of Health     Financial Resource Strain: Not on file   Food Insecurity: Not on file   Transportation Needs: Not on file   Physical Activity: Not on file   Stress: Not on file   Social Connections: Not on file   Intimate Partner Violence: Not on file   Housing Stability: Not on file      Family History   Problem Relation Age of Onset    Breast cancer Mother 74    Ovarian cancer Mother 82    Multiple myeloma Mother     Cancer Mother         Breast, Ovarian    Liver cancer Father 73    Hypertension Father     Diabetes Father     Arthritis Father             Cancer Father         Liver cancer    No Known Problems Sister     No Known Problems Sister     No Known Problems Sister     No Known Problems Sister     No Known Problems Daughter     No Known Problems Daughter     Colon cancer Maternal Grandmother 72    Thyroid cancer Maternal Grandfather 78    No Known Problems Paternal Grandmother     No Known Problems Paternal Grandfather     No Known Problems Maternal Aunt     No Known Problems Paternal Aunt     Liver cancer Paternal Aunt 75    Allergies Family         bronchitis    Diabetes Family     Seizures Family     Heart disease Family     Hypertension Family     Skin cancer Family      Past Surgical History:   Procedure Laterality Date    APPENDECTOMY      CARDIAC CATHETERIZATION      Description: Normal EF, No obstructive coronary artery disease, systolic hypertension..done at [facility] Resolved: 1998    CARDIAC PACEMAKER PLACEMENT Left      SECTION       COLONOSCOPY      DEBRIDEMENT TENNIS ELBOW      GANGLION CYST EXCISION Left     hand    HYSTERECTOMY  1994    KNEE ARTHROSCOPY Right     TX SURGICAL ARTHROSCOPY SHOULDER W/ROTATOR CUFF RPR Left 10/03/2016    Procedure: ARTHROSCOPY SHOULDER, ROTATOR CUFF REPAIR,SUBACROMIAL DECOMPRESSION ; MICROFRACTURE MIKAELA GLENOID HEAD;  Surgeon: Evens Joseph MD;  Location: [facility];  Service: Orthopedics    TONSILLECTOMY      TUBAL LIGATION      UPPER GASTROINTESTINAL ENDOSCOPY      WISDOM TOOTH EXTRACTION         Current Outpatient Medications:     aspirin 81 MG tablet, Take 81 mg by mouth daily, Disp: , Rfl:     azelastine (ASTELIN) 0.1 % nasal spray, 2 sprays into each nostril daily Use in each nostril as directed, Disp: 1 mL, Rfl: 2    Cholecalciferol (Vitamin D3) 50 MCG (2000 UT) TABS, 2 tabs daily, Disp: 60 tablet, Rfl: 10    digoxin (LANOXIN) 0.125 mg tablet, Take 1 tablet (125 mcg total) by mouth daily, Disp: 30 tablet, Rfl: 3    EPINEPHrine (EPIPEN) 0.3 mg/0.3 mL SOAJ, Inject 0.3 mL (0.3 mg total) into a muscle as needed for anaphylaxis (twin pack), Disp: 2 each, Rfl: 0    FLUTICASONE PROPIONATE, NASAL, NA, 2 sprays into each nostril as needed  , Disp: , Rfl:     levothyroxine 50 mcg tablet, Take 1 tablet (50 mcg total) by mouth daily, Disp: 90 tablet, Rfl: 3    loratadine (CLARITIN) 10 mg tablet, Take 10 mg by mouth daily, Disp: , Rfl:     metoprolol succinate (TOPROL-XL) 25 mg 24 hr tablet, Take 1 tablet (25 mg total) by mouth daily, Disp: 30 tablet, Rfl: 11    Omega-3 Fatty Acids (fish oil) 1,000 mg, Take 4 capsules (4,000 mg total) by mouth daily, Disp: 120 capsule, Rfl: 10    Red Yeast Rice 600 MG CAPS, Take 2 capsules (1,200 mg total) by mouth 2 (two) times a day, Disp: 120 capsule, Rfl: 10  Allergies   Allergen Reactions    Shellfish Allergy - Food Allergy Anaphylaxis    Shellfish-Derived Products - Food Allergy Anaphylaxis    Shrimp Flavor - Food Allergy Anaphylaxis    Darvon [Propoxyphene] Hives    Tramadol  "Hives    Ultram [Tramadol Hcl] Hives       Review of Systems:  Review of Systems   Constitutional:  Negative for chills, fatigue and fever.   Eyes:  Negative for discharge and visual disturbance.   Respiratory: Negative.  Negative for shortness of breath and wheezing.    Cardiovascular:  Positive for palpitations. Negative for chest pain and leg swelling.   Gastrointestinal:  Negative for abdominal pain, nausea and vomiting.   Endocrine: Negative.    Genitourinary: Negative.    Musculoskeletal: Negative.  Negative for arthralgias.   Skin: Negative.  Negative for rash.   Allergic/Immunologic: Negative.    Neurological:  Negative for dizziness and light-headedness.   Psychiatric/Behavioral: Negative.  The patient is not nervous/anxious.        Physical Exam:  Objective:   /72 (BP Location: Left arm, Patient Position: Sitting, Cuff Size: Large)   Pulse (!) 133   Ht 5' 5\" (1.651 m)   Wt 114 kg (251 lb)   LMP  (LMP Unknown)   BMI 41.77 kg/m²    Physical Exam:  GEN: NAD, Alert and oriented, well appearing; morbidly obese.   HEENT:Head, neck, ears, oral pharynx: Mucus membranes moist, oral pharynx clear, nares clear. External ears normal  EYES: Pupils equal, sclera anicteric  NECK: No JVD  CARDIOVASCULAR: RRR, No murmur, rub, gallops S1,S2  LUNGS: Clear To auscultation bilaterally  ABDOMEN: Soft, nondistended  EXTREMITIES/VASCULAR: No edema.  PSYCH: Normal Affect  NEURO: Grossly intact, moving all extremiteis equal, face symetric  HEME: No bleeding, bruising, petechia  SKIN: No significant rashes       Labs & Results:  Below is the patient's most recent value for Albumin, ALT, AST, BUN, Calcium, Chloride, Cholesterol, CO2, Creatinine, GFR, Glucose, HDL, Hematocrit, Hemoglobin, Hemoglobin A1C, LDL, Magnesium, Phosphorus, Platelets, Potassium, PSA, Sodium, Triglycerides, and WBC.   Lab Results   Component Value Date    ALT 14 07/24/2024    AST 17 07/24/2024    BUN 17 07/24/2024    CALCIUM 8.9 07/24/2024     " 2024    CHOL 204 2015    CO2 26 2024    CREATININE 0.93 2024    HDL 48 (L) 2024    HCT 40.7 2024    HGB 12.7 2024    HGBA1C 6.1 (H) 2024     2024    K 4.3 2024     2015    TRIG 98 2024    WBC 6.12 2024     Note: for a comprehensive list of the patient's lab results, access the Results Review activity.          Cardiac testing:     I personally reviewed the ECG performed in the clinic on 24. It reveals atrial paced, ventricular paced rhythm.     Echocardiograms:  Results for orders placed during the hospital encounter of 01/10/20   Echo complete with contrast if indicated    Narrative [place] [id number] [phone]    Transthoracic Echocardiogram  2D, M-mode, Doppler, and Color Doppler    Study date:  10-James-2020    Patient: CARIN WASHBURN  MR number: [id number]  Account number: [id number]  : 1960  Age: 59 years  Gender: Female  Status: Emergency  Location: Bedside  Height: 66 in  Weight: 251 lb  BP: 118/ 84 mmHg    Indications: Atrial fibrillation.    Diagnoses: I48.0 - Atrial fibrillation    Sonographer:  Rylee Giang RDCS  Primary Physician:  Dmitriy Holley DO  Referring Physician:  Laura Wilkinson PA-C  Group:  [facility]  Cardiology Fellow:  Hank Demarco MD  Interpreting Physician:  Wilfred Dougherty MD    SUMMARY    LEFT VENTRICLE:  Size was normal.  Systolic function was normal. Ejection fraction was estimated to be 55 %.  Wall thickness was mildly increased.  Features were consistent with a pseudonormal left ventricular filling pattern, with concomitant abnormal relaxation and increased filling pressure (grade 2 diastolic dysfunction).    VENTRICULAR SEPTUM:  There was dyssynergic motion. These changes are consistent with right ventricular pacing.    RIGHT VENTRICLE:  The size was normal.  Systolic function was  normal.    TRICUSPID VALVE:  There was trace regurgitation.    HISTORY: PRIOR HISTORY: pacemaker    PROCEDURE: The procedure was performed at the bedside. This was a routine study. The transthoracic approach was used. The study included complete 2D imaging, M-mode, complete spectral Doppler, and color Doppler. The heart rate was 65 bpm,  at the start of the study. Images were obtained from the parasternal, apical, subcostal, and suprasternal notch acoustic windows. Image quality was adequate.    LEFT VENTRICLE: Size was normal. Systolic function was normal. Ejection fraction was estimated to be 55 %. There were no regional wall motion abnormalities. Wall thickness was mildly increased. There was mild concentric hypertrophy. The  changes were consistent with concentric remodeling (increased wall thickness with normal wall mass). DOPPLER: There was a reduced contribution of atrial contraction to ventricular filling, due to increased ventricular diastolic pressure or  atrial contractile dysfunction. Features were consistent with a pseudonormal left ventricular filling pattern, with concomitant abnormal relaxation and increased filling pressure (grade 2 diastolic dysfunction).    VENTRICULAR SEPTUM: There was dyssynergic motion. These changes are consistent with right ventricular pacing.    RIGHT VENTRICLE: The size was normal. Systolic function was normal. A pacing wire was present.    LEFT ATRIUM: Size was at the upper limits of normal.    RIGHT ATRIUM: Size was normal. A pacing wire was present.    MITRAL VALVE: Valve structure was normal. There was normal leaflet separation. DOPPLER: The transmitral velocity was within the normal range. There was no evidence for stenosis. There was no significant regurgitation.    AORTIC VALVE: The valve was trileaflet. Leaflets exhibited normal thickness and normal cuspal separation. DOPPLER: Transaortic velocity was minimally increased. There was no evidence for stenosis. There  was no regurgitation.    TRICUSPID VALVE: The valve structure was normal. There was normal leaflet separation. DOPPLER: The transtricuspid velocity was within the normal range. There was no evidence for stenosis. There was trace regurgitation. The tricuspid jet  envelope definition was inadequate for estimation of RV systolic pressure.    PULMONIC VALVE: Leaflets exhibited normal thickness, no calcification, and normal cuspal separation. DOPPLER: The transpulmonic velocity was within the normal range. There was no evidence for stenosis. There was no significant  regurgitation.    PERICARDIUM: There was no pericardial effusion. The pericardium was normal in appearance.    AORTA: The root exhibited normal size.    SYSTEMIC VEINS: HEPATIC VEINS: The hepatic veins were not well visualized.    MEASUREMENT TABLES    2D MEASUREMENTS  LVOT   (Reference normals)  Diam   21 mm   (--)    DOPPLER MEASUREMENTS  LVOT   (Reference normals)  Peak richie   136 cm/s   (--)  Mean richie   82 cm/s   (--)  VTI   29.4 cm   (--)  Peak gradient   7 mmHg   (--)  Mean gradient   3.4 mmHg   (--)  Stroke vol   101.83 ml   (--)  Stroke index   0.61 ml/mï¾²   (--)  Aortic valve   (Reference normals)  Peak richie   196 cm/s   (--)  Mean richie   136 cm/s   (--)  VTI   41.3 cm   (--)  Peak gradient   15.3 mmHg   (--)  Mean gradient   8.2 mmHg   (--)  Obstr index, VTI   0.71    (--)  Valve area, VTI   2.46 cmï¾²   (--)  Area index, VTI   1.12 cmï¾²/mï¾²   (--)  Obstr index, Vmax   0.69    (--)  Valve area, Vmax   2.39 cmï¾²   (--)  Area index, Vmax   1.09 cmï¾²/mï¾²   (--)  Obstr index, Vmean   0.6    (--)  Valve area, Vmean   2.08 cmï¾²   (--)  Area index, Vmean   0.95 cmï¾²/mï¾²   (--)    SYSTEM MEASUREMENT TABLES    2D  %FS: 36.3 %  Ao Diam: 2.88 cm  EDV(Teich): 56.76 ml  EF(Teich): 66.89 %  ESV(Teich): 18.79 ml  IVSd: 1.08 cm  LA Area: 18.04 cm2  LA Diam: 3.17 cm  LVEDV MOD A4C: 81.37 ml  LVEF MOD A4C: 53.88 %  LVESV MOD A4C: 37.53 ml  LVIDd: 3.66  cm  LVIDs: 2.33 cm  LVLd A4C: 8.48 cm  LVLs A4C: 7.4 cm  LVOT Diam: 2.12 cm  LVPWd: 1.03 cm  RA Area: 14.58 cm2  RVIDd: 3.11 cm  SV MOD A4C: 43.85 ml  SV(Teich): 37.97 ml    CW  AV Env.Ti: 304.5 ms  AV VTI: 41.27 cm  AV Vmax: 1.96 m/s  AV Vmean: 1.36 m/s  AV maxPG: 15.32 mmHg  AV meanP.2 mmHg    MM  TAPSE: 2.34 cm    PW  JACKIE (VTI): 2.5 cm2  JACKIE Vmax: 2.44 cm2  LVOT Env.Ti: 359.86 ms  LVOT VTI: 29.37 cm  LVOT Vmax: 1.36 m/s  LVOT Vmean: 0.82 m/s  LVOT maxP.37 mmHg  LVOT meanPG: 3.38 mmHg  LVSV Dopp: 103.23 ml  MV A Anjel: 0.43 m/s  MV Dec [place]: 5.59 m/s2  MV DecT: 180.1 ms  MV E Anjel: 1.01 m/s  MV E/A Ratio: 2.35  MV PHT: 52.23 ms  MVA By PHT: 4.21 cm2    [employer] Accredited Echocardiography Laboratory    Prepared and electronically signed by    Wilfred Dougherty MD  Signed 10-James-2020 15:57:32       No results found for this or any previous visit.    Catheterizations:   No results found for this or any previous visit.    Stress Tests:  No results found for this or any previous visit.    Holter monitor -   No results found for this or any previous visit.  No results found for this or any previous visit.    Discussion/Summary:         ASSESSMENT/PLAN:  #Syncope, sinus bradycardia s/p PPM  - Pacemaker syndrome due to device reaching CHAS, and mode at VVI 65 bpm  - S/p gen change on 1/10/2020  - Symptoms resolved post generator change   - Device interrogation showed stable lead parameters  - RV threshold noted to be slightly higher but stable. Minimally ;  - Noise noted on the device leads only on one day; possibly external noise.     #Arthritis  - stable    #Morbidity obesity  - attempting to loose weight     #HTN   - Normotensive at home   - Continue diet and exercise control     #Atrial arrhythmia  - AT noted on device interrogation on 2020  - Termination of the episode not available so unclear how long it lasted  - However she was asymptomatic  - Continues to have SVT episodes  -Most recent  episode shows atrial tachycardia versus atrial flutter  - Discussed medications vs. Ablation vs. Monitoring  - Patient prefers to have ablation   - Office will be in touch with her to schedule ablation procedure. If not inducible then will do AAD   -Continue metoprolol and digoxin

## 2025-01-31 ENCOUNTER — RESULTS FOLLOW-UP (OUTPATIENT)
Dept: CARDIOLOGY CLINIC | Facility: CLINIC | Age: 65
End: 2025-01-31

## 2025-01-31 ENCOUNTER — IN-CLINIC DEVICE VISIT (OUTPATIENT)
Dept: CARDIOLOGY CLINIC | Facility: CLINIC | Age: 65
End: 2025-01-31
Payer: COMMERCIAL

## 2025-01-31 DIAGNOSIS — I49.5 SSS (SICK SINUS SYNDROME) (HCC): Primary | ICD-10-CM

## 2025-01-31 DIAGNOSIS — R55 SYNCOPE AND COLLAPSE: ICD-10-CM

## 2025-01-31 PROCEDURE — 93280 PM DEVICE PROGR EVAL DUAL: CPT | Performed by: INTERNAL MEDICINE

## 2025-02-12 ENCOUNTER — OFFICE VISIT (OUTPATIENT)
Dept: FAMILY MEDICINE CLINIC | Facility: CLINIC | Age: 65
End: 2025-02-12
Payer: COMMERCIAL

## 2025-02-12 VITALS
WEIGHT: 250 LBS | DIASTOLIC BLOOD PRESSURE: 74 MMHG | BODY MASS INDEX: 39.24 KG/M2 | SYSTOLIC BLOOD PRESSURE: 126 MMHG | HEART RATE: 68 BPM | HEIGHT: 67 IN | TEMPERATURE: 99 F | OXYGEN SATURATION: 98 %

## 2025-02-12 DIAGNOSIS — E03.8 OTHER SPECIFIED HYPOTHYROIDISM: ICD-10-CM

## 2025-02-12 DIAGNOSIS — T78.40XS ALLERGIC REACTION, SEQUELA: ICD-10-CM

## 2025-02-12 DIAGNOSIS — E66.09 CLASS 2 OBESITY DUE TO EXCESS CALORIES WITHOUT SERIOUS COMORBIDITY WITH BODY MASS INDEX (BMI) OF 37.0 TO 37.9 IN ADULT: Primary | ICD-10-CM

## 2025-02-12 DIAGNOSIS — R73.09 ABNORMAL BLOOD SUGAR: ICD-10-CM

## 2025-02-12 DIAGNOSIS — E78.2 MIXED HYPERLIPIDEMIA: ICD-10-CM

## 2025-02-12 DIAGNOSIS — E66.812 CLASS 2 OBESITY DUE TO EXCESS CALORIES WITHOUT SERIOUS COMORBIDITY WITH BODY MASS INDEX (BMI) OF 37.0 TO 37.9 IN ADULT: Primary | ICD-10-CM

## 2025-02-12 DIAGNOSIS — I47.10 PAROXYSMAL SVT (SUPRAVENTRICULAR TACHYCARDIA) (HCC): ICD-10-CM

## 2025-02-12 DIAGNOSIS — N18.30 STAGE 3 CHRONIC KIDNEY DISEASE, UNSPECIFIED WHETHER STAGE 3A OR 3B CKD (HCC): ICD-10-CM

## 2025-02-12 DIAGNOSIS — Z23 ENCOUNTER FOR IMMUNIZATION: ICD-10-CM

## 2025-02-12 DIAGNOSIS — E55.9 VITAMIN D DEFICIENCY: ICD-10-CM

## 2025-02-12 DIAGNOSIS — Z13.29 SCREENING FOR THYROID DISORDER: ICD-10-CM

## 2025-02-12 DIAGNOSIS — E03.8 SUBCLINICAL HYPOTHYROIDISM: ICD-10-CM

## 2025-02-12 DIAGNOSIS — R00.1 SINUS BRADYCARDIA: ICD-10-CM

## 2025-02-12 PROCEDURE — 90471 IMMUNIZATION ADMIN: CPT

## 2025-02-12 PROCEDURE — 90677 PCV20 VACCINE IM: CPT

## 2025-02-12 PROCEDURE — 99214 OFFICE O/P EST MOD 30 MIN: CPT | Performed by: FAMILY MEDICINE

## 2025-02-12 RX ORDER — EPINEPHRINE 0.3 MG/.3ML
0.3 INJECTION SUBCUTANEOUS AS NEEDED
Qty: 2 EACH | Refills: 3 | Status: SHIPPED | OUTPATIENT
Start: 2025-02-12

## 2025-02-12 RX ORDER — LEVOTHYROXINE SODIUM 50 UG/1
50 TABLET ORAL DAILY
Qty: 90 TABLET | Refills: 3 | Status: SHIPPED | OUTPATIENT
Start: 2025-02-12

## 2025-02-12 NOTE — ASSESSMENT & PLAN NOTE
Orders:  •  EPINEPHrine (EPIPEN) 0.3 mg/0.3 mL SOAJ; Inject 0.3 mL (0.3 mg total) into a muscle as needed for anaphylaxis (twin pack)

## 2025-02-12 NOTE — ASSESSMENT & PLAN NOTE
Weight 250, BMI 39.16.  Patient is lost 6 pounds since last visit.  Continue to work on healthy diet and regular exercise

## 2025-02-12 NOTE — ASSESSMENT & PLAN NOTE
Lab Results   Component Value Date    EGFR 66 07/24/2024    EGFR 50 07/19/2023    EGFR 54 07/18/2022    CREATININE 0.93 07/24/2024    CREATININE 1.17 07/19/2023    CREATININE 1.10 07/18/2022     Orders:  •  CBC and differential; Future  •  Comprehensive metabolic panel; Future

## 2025-02-12 NOTE — ASSESSMENT & PLAN NOTE
Status post pacemaker implantation due to syncopal episode/bradycardia 1998.  Patient subsequently had battery generator replaced in 2007, and again in 2020.  Patient has continued intermittent episodes of SVT.  She will be getting ablation on February 21.

## 2025-02-12 NOTE — PROGRESS NOTES
Name: Carol Silva      : 1960      MRN: [id number]  Encounter Provider: Dmitriy Holley DO  Encounter Date: 2025   Encounter department: [facility]    Assessment & Plan  Class 2 obesity due to excess calories without serious comorbidity with body mass index (BMI) of 37.0 to 37.9 in adult  Weight 250, BMI 39.16.  Patient is lost 6 pounds since last visit.  Continue to work on healthy diet and regular exercise       Sinus bradycardia  Status post pacemaker implantation due to syncopal episode/bradycardia .  Patient subsequently had battery generator replaced in , and again in .  Patient has continued intermittent episodes of SVT.  She will be getting ablation on .       Paroxysmal SVT (supraventricular tachycardia) (HCC)  History of intermittent SVT.  Patient will be getting ablation on        Stage 3 chronic kidney disease, unspecified whether stage 3a or 3b CKD (HCC)  Lab Results   Component Value Date    EGFR 66 2024    EGFR 50 2023    EGFR 54 2022    CREATININE 0.93 2024    CREATININE 1.17 2023    CREATININE 1.10 2022     Orders:  •  CBC and differential; Future  •  Comprehensive metabolic panel; Future    Mixed hyperlipidemia  Continue low-fat diet and exercise  Orders:  •  Lipid Panel with Direct LDL reflex; Future    Abnormal blood sugar  Continue reduced carb diet and exercise  Orders:  •  Comprehensive metabolic panel; Future  •  Hemoglobin A1C; Future    Vitamin D deficiency  Continue OTC vitamin D       Other specified hypothyroidism  Continue levothyroxine 50       Screening for thyroid disorder    Orders:  •  TSH, 3rd generation with Free T4 reflex; Future    Subclinical hypothyroidism  Continue levothyroxine 50  Orders:  •  levothyroxine 50 mcg tablet; Take 1 tablet (50 mcg total) by mouth daily    Allergic reaction, sequela    Orders:  •  EPINEPHrine (EPIPEN) 0.3 mg/0.3 mL SOAJ; Inject 0.3 mL (0.3  mg total) into a muscle as needed for anaphylaxis (twin pack)    Encounter for immunization    Orders:  •  Pneumococcal Conjugate Vaccine 20-valent (Pcv20)         Prevnar 20 given today  Patient had latest COVID booster  Patient had flu shot this season  Last tetanus booster 2022  Patient had Shingrix vaccination  Discussed RSV    Colonoscopy 2023 (next 2033)    Due for CMP, A1c, vitamin D.  Will call with results    6 months, fasting blood work prior        History of Present Illness     Patient presents for recheck of chronic medical problems.  She is scheduled for ablation on February 21.  Otherwise she is doing well.  Compliant on prescribed medications.  Prevnar 20 given today      Review of Systems   Respiratory: Negative.     Cardiovascular: Negative.    Gastrointestinal: Negative.    Genitourinary: Negative.      Past Medical History:   Diagnosis Date   • Abnormal blood chemistry     last assessed: 6/9/2014   • Allergic    • Allergic reaction     last assessed: 9/17/2012   • Allergic rhinitis     last assessed: 6/9/2014   • Arthralgia of left shoulder region     last assessed: 12/3/2013   • Arthritis    • Bradycardia    • Disease of thyroid gland    • Dysautonomia (HCC)    • Elevated blood pressure reading without diagnosis of hypertension     last assessed: 9/30/2016   • Endometriosis    • Familial combined hyperlipidemia     last assessed: 6/9/2014   • Female infertility    • Fibroid    • Functional murmur     description: soft systolic murmur - no valvular disease on 2D echo Last assessed: 4/23/2014   • Heart murmur 2000   • Hypertension    • Obstruction of eustachian tube     unspecified laterality Last assessed: 3/17/2014   • Polycystic ovary syndrome    • PONV (postoperative nausea and vomiting)    • Subclinical hypothyroidism 01/06/2019   • Symptomatic bradycardia    • Syncope    • Wears glasses      Past Surgical History:   Procedure Laterality Date   • APPENDECTOMY     • CARDIAC CATHETERIZATION       Description: Normal EF, No obstructive coronary artery disease, systolic hypertension..done at [facility] Resolved: 1998   • CARDIAC PACEMAKER PLACEMENT Left    •  SECTION     • COLONOSCOPY     • DEBRIDEMENT TENNIS ELBOW     • GANGLION CYST EXCISION Left     hand   • HYSTERECTOMY     • KNEE ARTHROSCOPY Right    • [place] SURGICAL ARTHROSCOPY SHOULDER W/ROTATOR CUFF RPR Left 10/03/2016    Procedure: ARTHROSCOPY SHOULDER, ROTATOR CUFF REPAIR,SUBACROMIAL DECOMPRESSION ; MICROFRACTURE MIKAELA GLENOID HEAD;  Surgeon: Evens Joseph MD;  Location: [facility];  Service: Orthopedics   • TONSILLECTOMY     • TUBAL LIGATION     • UPPER GASTROINTESTINAL ENDOSCOPY     • WISDOM TOOTH EXTRACTION       Family History   Problem Relation Age of Onset   • Breast cancer Mother 74   • Ovarian cancer Mother 82   • Multiple myeloma Mother    • Cancer Mother         Breast, Ovarian   • Liver cancer Father 73   • Hypertension Father    • Diabetes Father    • Arthritis Father            • Cancer Father         Liver cancer   • No Known Problems Sister    • No Known Problems Sister    • No Known Problems Sister    • No Known Problems Sister    • No Known Problems Daughter    • No Known Problems Daughter    • Colon cancer Maternal Grandmother 72   • Thyroid cancer Maternal Grandfather 78   • No Known Problems Paternal Grandmother    • No Known Problems Paternal Grandfather    • No Known Problems Maternal Aunt    • No Known Problems Paternal Aunt    • Liver cancer Paternal Aunt 75   • Allergies Family         bronchitis   • Diabetes Family    • Seizures Family    • Heart disease Family    • Hypertension Family    • Skin cancer Family      Social History     Tobacco Use   • Smoking status: Never     Passive exposure: Never   • Smokeless tobacco: Never   Vaping Use   • Vaping status: Never Used   Substance and Sexual Activity   • Alcohol use: Not Currently     Comment: at ocassions only   • Drug use: No   • Sexual  activity: Yes     Partners: Male     Birth control/protection: Female Sterilization     Current Outpatient Medications on File Prior to Visit   Medication Sig   • aspirin 81 MG tablet Take 81 mg by mouth daily   • azelastine (ASTELIN) 0.1 % nasal spray 2 sprays into each nostril daily Use in each nostril as directed   • Cholecalciferol (Vitamin D3) 50 MCG (2000 UT) TABS 2 tabs daily   • digoxin (LANOXIN) 0.125 mg tablet Take 1 tablet (125 mcg total) by mouth daily   • FLUTICASONE PROPIONATE, NASAL, NA 2 sprays into each nostril as needed     • loratadine (CLARITIN) 10 mg tablet Take 10 mg by mouth daily   • metoprolol succinate (TOPROL-XL) 25 mg 24 hr tablet Take 1 tablet (25 mg total) by mouth daily   • Omega-3 Fatty Acids (fish oil) 1,000 mg Take 4 capsules (4,000 mg total) by mouth daily   • Red Yeast Rice 600 MG CAPS Take 2 capsules (1,200 mg total) by mouth 2 (two) times a day   • [DISCONTINUED] levothyroxine 50 mcg tablet Take 1 tablet (50 mcg total) by mouth daily   • [DISCONTINUED] EPINEPHrine (EPIPEN) 0.3 mg/0.3 mL SOAJ Inject 0.3 mL (0.3 mg total) into a muscle as needed for anaphylaxis (twin pack)     Allergies   Allergen Reactions   • Shellfish Allergy - Food Allergy Anaphylaxis   • Shellfish-Derived Products - Food Allergy Anaphylaxis   • Shrimp Flavor - Food Allergy Anaphylaxis   • Darvon [Propoxyphene] Hives   • Tramadol Hives   • Ultram [Tramadol Hcl] Hives     Immunization History   Administered Date(s) Administered   • COVID-19 Moderna Vac BIVALENT 12 Yr+ IM 0.5 ML 09/25/2022   • COVID-19 PFIZER VACCINE 0.3 ML IM 12/23/2020, 01/11/2021, 09/30/2021   • COVID-19 Pfizer mRNA vacc PF alfredo-sucrose 12 yr and older (Comirnaty) 09/27/2023, 09/30/2024   • COVID-19 Pfizer vac (Alfredo-sucrose, gray cap) 12 yr+ IM 04/16/2022, 09/30/2024   • INFLUENZA 10/01/2016, 10/02/2017, 10/30/2019, 10/20/2020, 09/23/2021, 10/03/2022, 10/02/2023, 10/01/2024   • Influenza Quadrivalent, 6-35 Months IM 10/02/2017   • Influenza,  "Quadrivalent (nasal) 10/01/2016   • Pneumococcal Conjugate Vaccine 20-valent (Pcv20), Polysace 02/12/2025   • Tdap 01/19/2022   • Zoster Vaccine Recombinant 07/17/2020, 10/19/2020     Objective   /74   Pulse 68   Temp 99 °F (37.2 °C)   Ht 5' 7\" (1.702 m)   Wt 113 kg (250 lb)   LMP  (LMP Unknown)   SpO2 98%   BMI 39.16 kg/m²     Physical Exam  Cardiovascular:      Rate and Rhythm: Normal rate and regular rhythm.      Heart sounds: Normal heart sounds.      Comments: Carotids: no bruits  Ext: no edema  Pulmonary:      Effort: Pulmonary effort is normal. No respiratory distress.      Breath sounds: No wheezing or rales.   Psychiatric:         Behavior: Behavior normal.         Thought Content: Thought content normal.         "

## 2025-02-12 NOTE — ASSESSMENT & PLAN NOTE
Continue reduced carb diet and exercise  Orders:  •  Comprehensive metabolic panel; Future  •  Hemoglobin A1C; Future

## 2025-02-14 ENCOUNTER — APPOINTMENT (OUTPATIENT)
Dept: LAB | Facility: CLINIC | Age: 65
End: 2025-02-14
Payer: COMMERCIAL

## 2025-02-14 DIAGNOSIS — R73.09 ABNORMAL BLOOD SUGAR: ICD-10-CM

## 2025-02-14 DIAGNOSIS — E55.9 VITAMIN D DEFICIENCY: ICD-10-CM

## 2025-02-14 LAB
25(OH)D3 SERPL-MCNC: 70.6 NG/ML (ref 30–100)
ALBUMIN SERPL BCG-MCNC: 3.8 G/DL (ref 3.5–5)
ALP SERPL-CCNC: 80 U/L (ref 34–104)
ALT SERPL W P-5'-P-CCNC: 18 U/L (ref 7–52)
ANION GAP SERPL CALCULATED.3IONS-SCNC: 10 MMOL/L (ref 4–13)
AST SERPL W P-5'-P-CCNC: 18 U/L (ref 13–39)
BASOPHILS # BLD AUTO: 0.05 THOUSANDS/ΜL (ref 0–0.1)
BASOPHILS NFR BLD AUTO: 1 % (ref 0–1)
BILIRUB SERPL-MCNC: 0.43 MG/DL (ref 0.2–1)
BUN SERPL-MCNC: 15 MG/DL (ref 5–25)
CALCIUM SERPL-MCNC: 8.9 MG/DL (ref 8.4–10.2)
CHLORIDE SERPL-SCNC: 103 MMOL/L (ref 96–108)
CO2 SERPL-SCNC: 27 MMOL/L (ref 21–32)
CREAT SERPL-MCNC: 0.85 MG/DL (ref 0.6–1.3)
EOSINOPHIL # BLD AUTO: 0.31 THOUSAND/ΜL (ref 0–0.61)
EOSINOPHIL NFR BLD AUTO: 5 % (ref 0–6)
ERYTHROCYTE [DISTWIDTH] IN BLOOD BY AUTOMATED COUNT: 13 % (ref 11.6–15.1)
EST. AVERAGE GLUCOSE BLD GHB EST-MCNC: 137 MG/DL
GFR SERPL CREATININE-BSD FRML MDRD: 73 ML/MIN/1.73SQ M
GLUCOSE P FAST SERPL-MCNC: 118 MG/DL (ref 65–99)
HBA1C MFR BLD: 6.4 %
HCT VFR BLD AUTO: 38.3 % (ref 34.8–46.1)
HGB BLD-MCNC: 11.7 G/DL (ref 11.5–15.4)
IMM GRANULOCYTES # BLD AUTO: 0.02 THOUSAND/UL (ref 0–0.2)
IMM GRANULOCYTES NFR BLD AUTO: 0 % (ref 0–2)
INR PPP: 0.96 (ref 0.85–1.19)
LYMPHOCYTES # BLD AUTO: 1.81 THOUSANDS/ΜL (ref 0.6–4.47)
LYMPHOCYTES NFR BLD AUTO: 29 % (ref 14–44)
MCH RBC QN AUTO: 28.1 PG (ref 26.8–34.3)
MCHC RBC AUTO-ENTMCNC: 30.5 G/DL (ref 31.4–37.4)
MCV RBC AUTO: 92 FL (ref 82–98)
MONOCYTES # BLD AUTO: 0.44 THOUSAND/ΜL (ref 0.17–1.22)
MONOCYTES NFR BLD AUTO: 7 % (ref 4–12)
NEUTROPHILS # BLD AUTO: 3.7 THOUSANDS/ΜL (ref 1.85–7.62)
NEUTS SEG NFR BLD AUTO: 58 % (ref 43–75)
NRBC BLD AUTO-RTO: 0 /100 WBCS
PLATELET # BLD AUTO: 309 THOUSANDS/UL (ref 149–390)
PMV BLD AUTO: 10.4 FL (ref 8.9–12.7)
POTASSIUM SERPL-SCNC: 4.3 MMOL/L (ref 3.5–5.3)
PROT SERPL-MCNC: 6.7 G/DL (ref 6.4–8.4)
PROTHROMBIN TIME: 13.1 SECONDS (ref 12.3–15)
RBC # BLD AUTO: 4.17 MILLION/UL (ref 3.81–5.12)
SODIUM SERPL-SCNC: 140 MMOL/L (ref 135–147)
WBC # BLD AUTO: 6.33 THOUSAND/UL (ref 4.31–10.16)

## 2025-02-14 PROCEDURE — 82306 VITAMIN D 25 HYDROXY: CPT

## 2025-02-14 PROCEDURE — 83036 HEMOGLOBIN GLYCOSYLATED A1C: CPT

## 2025-02-16 ENCOUNTER — RESULTS FOLLOW-UP (OUTPATIENT)
Dept: FAMILY MEDICINE CLINIC | Facility: CLINIC | Age: 65
End: 2025-02-16

## 2025-02-17 ENCOUNTER — RESULTS FOLLOW-UP (OUTPATIENT)
Dept: CARDIOLOGY CLINIC | Facility: CLINIC | Age: 65
End: 2025-02-17

## 2025-02-18 ENCOUNTER — TELEPHONE (OUTPATIENT)
Age: 65
End: 2025-02-18

## 2025-02-18 NOTE — TELEPHONE ENCOUNTER
Caller: Carol Silva    Doctor: Dr. Baker    Reason for call: She received an email saying she is scheduled  for a hospital follow up appointment on 4/3/2025. She will be out of town until 4/13/2025.  I was going to reschedule the appointment but it said it was linked to a case.  Please call her to reschedule that appointment.    Thank you    Call back#: [phone]

## 2025-02-18 NOTE — TELEPHONE ENCOUNTER
----- Message from Thanh Baker MD sent at 2/17/2025  7:13 PM EST -----  Please call the patient about normal lab results.     Thank you.

## 2025-02-20 ENCOUNTER — ANESTHESIA EVENT (OUTPATIENT)
Dept: ANESTHESIOLOGY | Facility: HOSPITAL | Age: 65
End: 2025-02-20

## 2025-02-20 ENCOUNTER — ANESTHESIA (OUTPATIENT)
Dept: ANESTHESIOLOGY | Facility: HOSPITAL | Age: 65
End: 2025-02-20

## 2025-02-21 ENCOUNTER — HOSPITAL ENCOUNTER (OUTPATIENT)
Facility: HOSPITAL | Age: 65
Setting detail: OUTPATIENT SURGERY
Discharge: HOME | End: 2025-02-21
Attending: INTERNAL MEDICINE | Admitting: INTERNAL MEDICINE
Payer: COMMERCIAL

## 2025-02-21 ENCOUNTER — ANESTHESIA EVENT (OUTPATIENT)
Dept: NON INVASIVE DIAGNOSTICS | Facility: HOSPITAL | Age: 65
End: 2025-02-21
Payer: COMMERCIAL

## 2025-02-21 ENCOUNTER — ANESTHESIA (OUTPATIENT)
Dept: NON INVASIVE DIAGNOSTICS | Facility: HOSPITAL | Age: 65
End: 2025-02-21
Payer: COMMERCIAL

## 2025-02-21 VITALS
HEART RATE: 59 BPM | BODY MASS INDEX: 37.98 KG/M2 | HEIGHT: 67 IN | RESPIRATION RATE: 17 BRPM | TEMPERATURE: 98.2 F | DIASTOLIC BLOOD PRESSURE: 64 MMHG | OXYGEN SATURATION: 96 % | WEIGHT: 242 LBS | SYSTOLIC BLOOD PRESSURE: 123 MMHG

## 2025-02-21 DIAGNOSIS — I47.10 SVT (SUPRAVENTRICULAR TACHYCARDIA) (HCC): ICD-10-CM

## 2025-02-21 PROBLEM — Z95.0 STATUS POST PLACEMENT OF CARDIAC PACEMAKER: Status: ACTIVE | Noted: 2025-02-21

## 2025-02-21 LAB
ANION GAP SERPL CALCULATED.3IONS-SCNC: 8 MMOL/L (ref 4–13)
ATRIAL RATE: 62 BPM
ATRIAL RATE: 63 BPM
BASOPHILS # BLD MANUAL: 0 THOUSAND/UL (ref 0–0.1)
BASOPHILS NFR MAR MANUAL: 0 % (ref 0–1)
BUN SERPL-MCNC: 15 MG/DL (ref 5–25)
CALCIUM SERPL-MCNC: 9.1 MG/DL (ref 8.4–10.2)
CHLORIDE SERPL-SCNC: 104 MMOL/L (ref 96–108)
CO2 SERPL-SCNC: 26 MMOL/L (ref 21–32)
CREAT SERPL-MCNC: 0.95 MG/DL (ref 0.6–1.3)
EOSINOPHIL # BLD MANUAL: 0.3 THOUSAND/UL (ref 0–0.4)
EOSINOPHIL NFR BLD MANUAL: 4 % (ref 0–6)
ERYTHROCYTE [DISTWIDTH] IN BLOOD BY AUTOMATED COUNT: 12.8 % (ref 11.6–15.1)
GFR SERPL CREATININE-BSD FRML MDRD: 64 ML/MIN/1.73SQ M
GIANT PLATELETS BLD QL SMEAR: PRESENT
GLUCOSE P FAST SERPL-MCNC: 108 MG/DL (ref 65–99)
GLUCOSE SERPL-MCNC: 108 MG/DL (ref 65–140)
HCT VFR BLD AUTO: 39.6 % (ref 34.8–46.1)
HGB BLD-MCNC: 12.9 G/DL (ref 11.5–15.4)
INR PPP: 1.09 (ref 0.85–1.19)
LYMPHOCYTES # BLD AUTO: 2.18 THOUSAND/UL (ref 0.6–4.47)
LYMPHOCYTES # BLD AUTO: 24 % (ref 14–44)
MCH RBC QN AUTO: 28.5 PG (ref 26.8–34.3)
MCHC RBC AUTO-ENTMCNC: 32.6 G/DL (ref 31.4–37.4)
MCV RBC AUTO: 87 FL (ref 82–98)
MONOCYTES # BLD AUTO: 0.68 THOUSAND/UL (ref 0–1.22)
MONOCYTES NFR BLD: 9 % (ref 4–12)
NEUTROPHILS # BLD MANUAL: 4.36 THOUSAND/UL (ref 1.85–7.62)
NEUTS SEG NFR BLD AUTO: 58 % (ref 43–75)
OVALOCYTES BLD QL SMEAR: PRESENT
P AXIS: 4 DEGREES
P AXIS: 51 DEGREES
PLATELET # BLD AUTO: 331 THOUSANDS/UL (ref 149–390)
PLATELET BLD QL SMEAR: ADEQUATE
PMV BLD AUTO: 9.5 FL (ref 8.9–12.7)
POIKILOCYTOSIS BLD QL SMEAR: PRESENT
POTASSIUM SERPL-SCNC: 4.1 MMOL/L (ref 3.5–5.3)
PR INTERVAL: 192 MS
PR INTERVAL: 278 MS
PROTHROMBIN TIME: 14.4 SECONDS (ref 12.3–15)
QRS AXIS: 50 DEGREES
QRS AXIS: 58 DEGREES
QRSD INTERVAL: 88 MS
QRSD INTERVAL: 92 MS
QT INTERVAL: 450 MS
QT INTERVAL: 462 MS
QTC INTERVAL: 461 MS
QTC INTERVAL: 470 MS
RBC # BLD AUTO: 4.53 MILLION/UL (ref 3.81–5.12)
RBC MORPH BLD: PRESENT
SODIUM SERPL-SCNC: 138 MMOL/L (ref 135–147)
T WAVE AXIS: 24 DEGREES
T WAVE AXIS: 35 DEGREES
VARIANT LYMPHS # BLD AUTO: 5 %
VENTRICULAR RATE: 62 BPM
VENTRICULAR RATE: 63 BPM
WBC # BLD AUTO: 7.52 THOUSAND/UL (ref 4.31–10.16)

## 2025-02-21 PROCEDURE — C1894 INTRO/SHEATH, NON-LASER: HCPCS | Performed by: INTERNAL MEDICINE

## 2025-02-21 PROCEDURE — 76937 US GUIDE VASCULAR ACCESS: CPT | Performed by: INTERNAL MEDICINE

## 2025-02-21 PROCEDURE — 93623 PRGRMD STIMJ&PACG IV RX NFS: CPT | Performed by: INTERNAL MEDICINE

## 2025-02-21 PROCEDURE — C1766 INTRO/SHEATH,STRBLE,NON-PEEL: HCPCS | Performed by: INTERNAL MEDICINE

## 2025-02-21 PROCEDURE — 85027 COMPLETE CBC AUTOMATED: CPT | Performed by: PHYSICIAN ASSISTANT

## 2025-02-21 PROCEDURE — C1732 CATH, EP, DIAG/ABL, 3D/VECT: HCPCS | Performed by: INTERNAL MEDICINE

## 2025-02-21 PROCEDURE — 93653 COMPRE EP EVAL TX SVT: CPT | Performed by: INTERNAL MEDICINE

## 2025-02-21 PROCEDURE — C1730 CATH, EP, 19 OR FEW ELECT: HCPCS | Performed by: INTERNAL MEDICINE

## 2025-02-21 PROCEDURE — 93010 ELECTROCARDIOGRAM REPORT: CPT | Performed by: INTERNAL MEDICINE

## 2025-02-21 PROCEDURE — 93005 ELECTROCARDIOGRAM TRACING: CPT

## 2025-02-21 PROCEDURE — 93286 PERI-PX EVAL PM/LDLS PM IP: CPT | Performed by: INTERNAL MEDICINE

## 2025-02-21 PROCEDURE — 93620 COMP EP EVL R AT VEN PAC&REC: CPT | Performed by: INTERNAL MEDICINE

## 2025-02-21 PROCEDURE — NC001 PR NO CHARGE: Performed by: PHYSICIAN ASSISTANT

## 2025-02-21 PROCEDURE — 85007 BL SMEAR W/DIFF WBC COUNT: CPT | Performed by: PHYSICIAN ASSISTANT

## 2025-02-21 PROCEDURE — 80048 BASIC METABOLIC PNL TOTAL CA: CPT | Performed by: PHYSICIAN ASSISTANT

## 2025-02-21 PROCEDURE — 85610 PROTHROMBIN TIME: CPT | Performed by: PHYSICIAN ASSISTANT

## 2025-02-21 RX ORDER — ACETAMINOPHEN 325 MG/1
975 TABLET ORAL EVERY 6 HOURS PRN
Status: DISCONTINUED | OUTPATIENT
Start: 2025-02-21 | End: 2025-02-21 | Stop reason: HOSPADM

## 2025-02-21 RX ORDER — SODIUM CHLORIDE 9 MG/ML
20 INJECTION, SOLUTION INTRAVENOUS ONCE
Status: COMPLETED | OUTPATIENT
Start: 2025-02-21 | End: 2025-02-21

## 2025-02-21 RX ORDER — CEFAZOLIN SODIUM 2 G/50ML
SOLUTION INTRAVENOUS AS NEEDED
Status: DISCONTINUED | OUTPATIENT
Start: 2025-02-21 | End: 2025-02-21

## 2025-02-21 RX ORDER — DIPHENHYDRAMINE HYDROCHLORIDE 50 MG/ML
INJECTION INTRAMUSCULAR; INTRAVENOUS AS NEEDED
Status: DISCONTINUED | OUTPATIENT
Start: 2025-02-21 | End: 2025-02-21

## 2025-02-21 RX ORDER — FENTANYL CITRATE 50 UG/ML
INJECTION, SOLUTION INTRAMUSCULAR; INTRAVENOUS AS NEEDED
Status: DISCONTINUED | OUTPATIENT
Start: 2025-02-21 | End: 2025-02-21

## 2025-02-21 RX ORDER — LIDOCAINE HYDROCHLORIDE 10 MG/ML
INJECTION, SOLUTION EPIDURAL; INFILTRATION; INTRACAUDAL; PERINEURAL CODE/TRAUMA/SEDATION MEDICATION
Status: DISCONTINUED | OUTPATIENT
Start: 2025-02-21 | End: 2025-02-21 | Stop reason: HOSPADM

## 2025-02-21 RX ORDER — PROPOFOL 10 MG/ML
INJECTION, EMULSION INTRAVENOUS AS NEEDED
Status: DISCONTINUED | OUTPATIENT
Start: 2025-02-21 | End: 2025-02-21

## 2025-02-21 RX ORDER — MIDAZOLAM HYDROCHLORIDE 2 MG/2ML
INJECTION, SOLUTION INTRAMUSCULAR; INTRAVENOUS AS NEEDED
Status: DISCONTINUED | OUTPATIENT
Start: 2025-02-21 | End: 2025-02-21

## 2025-02-21 RX ADMIN — PROPOFOL 40 MG: 10 INJECTION, EMULSION INTRAVENOUS at 11:27

## 2025-02-21 RX ADMIN — FENTANYL CITRATE 50 MCG: 50 INJECTION, SOLUTION INTRAMUSCULAR; INTRAVENOUS at 10:09

## 2025-02-21 RX ADMIN — FENTANYL CITRATE 25 MCG: 50 INJECTION, SOLUTION INTRAMUSCULAR; INTRAVENOUS at 11:17

## 2025-02-21 RX ADMIN — MIDAZOLAM HYDROCHLORIDE 2 MG: 2 INJECTION, SOLUTION INTRAMUSCULAR; INTRAVENOUS at 10:09

## 2025-02-21 RX ADMIN — DIPHENHYDRAMINE HYDROCHLORIDE 12.5 MG: 50 INJECTION INTRAMUSCULAR; INTRAVENOUS at 10:08

## 2025-02-21 RX ADMIN — PROPOFOL 90 MCG/KG/MIN: 10 INJECTION, EMULSION INTRAVENOUS at 10:09

## 2025-02-21 RX ADMIN — SODIUM CHLORIDE: 0.9 INJECTION, SOLUTION INTRAVENOUS at 09:56

## 2025-02-21 RX ADMIN — CEFAZOLIN SODIUM 2000 MG: 2 SOLUTION INTRAVENOUS at 10:31

## 2025-02-21 NOTE — ANESTHESIA POSTPROCEDURE EVALUATION
Post-Op Assessment Note    CV Status:  Stable  Pain Score: 0    Pain management: adequate       Mental Status:  Awake and alert   Hydration Status:  Stable   PONV Controlled:  None   Airway Patency:  Patent and adequate     Post Op Vitals Reviewed: Yes    No anethesia notable event occurred.    Staff: CRNA, Anesthesiologist           Last Filed PACU Vitals:  Vitals Value Taken Time   Temp 98    Pulse 67    /68    Resp 20    SpO2 100

## 2025-02-21 NOTE — H&P
H&P Exam - Electrophysiology  Carol Silva 64 y.o. female MRN: [id number]  Unit/Bed#: BE CATH LAB ROOM Encounter: [id number]    Assessment & Plan     Assessment & Plan  Paroxysmal SVT (supraventricular tachycardia) (HCC)  - maintained on beta blocker therapy of metoprolol succinate 25mg daily and digoxin 125mcg daily  - has had atrial arrhythmias noted on device interrogation 4/2020, asymptomatic (concern for atrial tachycardia versus flutter)    Patient presents today to undergo EP study/SVT ablation.  If this is not inducible, will need to consider antiarrhythmic therapy.  Status post placement of [employer] dual chamber pacemaker  - implanted first due to syncope 5/1998  - atrial lead noise noted on last interrogation  - underwent generator change 2007 and 1/10/2020  Sinus bradycardia    Mixed hyperlipidemia    Class 2 obesity due to excess calories without serious comorbidity with body mass index (BMI) of 37.0 to 37.9 in adult      History of Present Illness   HPI:  Carol Silva is a 64 y.o. year old female with  history of syncope status post dual-chamber pacemaker, hypertension, hyperlipidemia, hypothyroidism, obesity with BMI of 37, and arthritis.     Patient follows with writer again as an outpatient but was more recently seen by Dr. Baker of electrophysiology.  She was sent in back in 2020 due to her pacemaker that was implanted for syncope changing to VVI 65 when it had reached [facility].    Since then, her pacemaker has been followed and she has been noted to have atrial arrhythmias since April 2020.  As these have become more frequent, she was seen back in the office and discussion was had between pursuing ablation versus medications.    Patient reports that she has had 1 episode of SVT with heart rate of 110 bpm but not as fast as other she has felt.    EKG:           Review of Systems  ROS as noted above, otherwise 12 point review of systems was performed and is negative.     Historical Information   Past  Medical History:   Diagnosis Date    Abnormal blood chemistry     last assessed: 2014    Allergic     Allergic reaction     last assessed: 2012    Allergic rhinitis     last assessed: 2014    Arthralgia of left shoulder region     last assessed: 12/3/2013    Arthritis     Bradycardia     Disease of thyroid gland     Dysautonomia (HCC)     Elevated blood pressure reading without diagnosis of hypertension     last assessed: 2016    Endometriosis     Familial combined hyperlipidemia     last assessed: 2014    Female infertility     Fibroid     Functional murmur     description: soft systolic murmur - no valvular disease on 2D echo Last assessed: 2014    Heart murmur 2000    Hypertension     Obstruction of eustachian tube     unspecified laterality Last assessed: 3/17/2014    Polycystic ovary syndrome     PONV (postoperative nausea and vomiting)     Subclinical hypothyroidism 2019    Symptomatic bradycardia     Syncope     Wears glasses      Past Surgical History:   Procedure Laterality Date    APPENDECTOMY      CARDIAC CATHETERIZATION      Description: Normal EF, No obstructive coronary artery disease, systolic hypertension..done at [facility] Resolved: 1998    CARDIAC PACEMAKER PLACEMENT Left      SECTION      COLONOSCOPY      DEBRIDEMENT TENNIS ELBOW      GANGLION CYST EXCISION Left     hand    HYSTERECTOMY      KNEE ARTHROSCOPY Right     IA SURGICAL ARTHROSCOPY SHOULDER W/ROTATOR CUFF RPR Left 10/03/2016    Procedure: ARTHROSCOPY SHOULDER, ROTATOR CUFF REPAIR,SUBACROMIAL DECOMPRESSION ; MICROFRACTURE MIKAELA GLENOID HEAD;  Surgeon: Evens Joseph MD;  Location: [facility];  Service: Orthopedics    TONSILLECTOMY      TUBAL LIGATION      UPPER GASTROINTESTINAL ENDOSCOPY      WISDOM TOOTH EXTRACTION       Family History:   Family History   Problem Relation Age of Onset    Breast cancer Mother 74    Ovarian cancer Mother 82    Multiple myeloma Mother     Cancer  Mother         Breast, Ovarian    Liver cancer Father 73    Hypertension Father     Diabetes Father     Arthritis Father             Cancer Father         Liver cancer    No Known Problems Sister     No Known Problems Sister     No Known Problems Sister     No Known Problems Sister     No Known Problems Daughter     No Known Problems Daughter     Colon cancer Maternal Grandmother 72    Thyroid cancer Maternal Grandfather 78    No Known Problems Paternal Grandmother     No Known Problems Paternal Grandfather     No Known Problems Maternal Aunt     No Known Problems Paternal Aunt     Liver cancer Paternal Aunt 75    Allergies Family         bronchitis    Diabetes Family     Seizures Family     Heart disease Family     Hypertension Family     Skin cancer Family        Social History   Social History     Substance and Sexual Activity   Alcohol Use Not Currently    Comment: at ocassions only     Social History     Substance and Sexual Activity   Drug Use No     Social History     Tobacco Use   Smoking Status Never    Passive exposure: Never   Smokeless Tobacco Never       Meds/Allergies   all medications and allergies reviewed  Home Medications:   Medications Prior to Admission:     aspirin 81 MG tablet    azelastine (ASTELIN) 0.1 % nasal spray    Cholecalciferol (Vitamin D3) 50 MCG (2000) TABS    digoxin (LANOXIN) 0.125 mg tablet    EPINEPHrine (EPIPEN) 0.3 mg/0.3 mL SOAJ    FLUTICASONE PROPIONATE, NASAL, NA    levothyroxine 50 mcg tablet    loratadine (CLARITIN) 10 mg tablet    metoprolol succinate (TOPROL-XL) 25 mg 24 hr tablet    Omega-3 Fatty Acids (fish oil) 1,000 mg    Red Yeast Rice 600 MG CAPS    Allergies   Allergen Reactions    Shellfish Allergy - Food Allergy Anaphylaxis    Shellfish-Derived Products - Food Allergy Anaphylaxis    Shrimp Flavor - Food Allergy Anaphylaxis    Darvon [Propoxyphene] Hives    Tramadol Hives    Ultram [Tramadol Hcl] Hives       Objective   Vitals: Blood pressure (!)  "184/71, pulse 64, temperature 98.3 °F (36.8 °C), temperature source Temporal, resp. rate 17, height 5' 7\" (1.702 m), weight 110 kg (242 lb), SpO2 98%, not currently breastfeeding.  Orthostatic Blood Pressures      Flowsheet Row Most Recent Value   Blood Pressure 184/71  [RN aware] filed at 2025 0841            No intake or output data in the 24 hours ending 25 0851    Invasive Devices       None                   Physical Exam   GEN: NAD, alert and oriented, well appearing  SKIN: dry without significant lesions or rashes  HEENT: NCAT, PERRL, EOMs intact  NECK: No JVD appreciated  CARDIOVASCULAR: regular  LUNGS: Good respiratory effort with no audible wheezes  PSYCH: Normal mood and affect  NEURO: CN ll-Xll grossly intact      Lab Results: I have personally reviewed pertinent lab results.              Invalid input(s): \"LABGLOM\"              Imaging: Results Review Statement: No pertinent imaging studies reviewed.  Results for orders placed during the hospital encounter of 01/10/20    Echo complete with contrast if indicated    [place] [address]  [phone]    Transthoracic Echocardiogram  2D, M-mode, Doppler, and Color Doppler    Study date:  10-James-2020    Patient: CARIN WASHBURN  MR number: [id number]  Account number: [id number]  : 1960  Age: 59 years  Gender: Female  Status: Emergency  Location: Bedside  Height: 66 in  Weight: 251 lb  BP: 118/ 84 mmHg    Indications: Atrial fibrillation.    Diagnoses: I48.0 - Atrial fibrillation    Sonographer:  Rylee Giang RDCS  Primary Physician:  Dmitriy Holley DO  Referring Physician:  Laura Wilkinson PA-C  Group:  [facility]  Cardiology Fellow:  Hank Demarco MD  Interpreting Physician:  Wilfred Dougherty MD    SUMMARY    LEFT VENTRICLE:  Size was normal.  Systolic function was normal. Ejection fraction was estimated to be 55 %.  Wall thickness was " mildly increased.  Features were consistent with a pseudonormal left ventricular filling pattern, with concomitant abnormal relaxation and increased filling pressure (grade 2 diastolic dysfunction).    VENTRICULAR SEPTUM:  There was dyssynergic motion. These changes are consistent with right ventricular pacing.    RIGHT VENTRICLE:  The size was normal.  Systolic function was normal.    TRICUSPID VALVE:  There was trace regurgitation.    HISTORY: PRIOR HISTORY: pacemaker    PROCEDURE: The procedure was performed at the bedside. This was a routine study. The transthoracic approach was used. The study included complete 2D imaging, M-mode, complete spectral Doppler, and color Doppler. The heart rate was 65 bpm,  at the start of the study. Images were obtained from the parasternal, apical, subcostal, and suprasternal notch acoustic windows. Image quality was adequate.    LEFT VENTRICLE: Size was normal. Systolic function was normal. Ejection fraction was estimated to be 55 %. There were no regional wall motion abnormalities. Wall thickness was mildly increased. There was mild concentric hypertrophy. The  changes were consistent with concentric remodeling (increased wall thickness with normal wall mass). DOPPLER: There was a reduced contribution of atrial contraction to ventricular filling, due to increased ventricular diastolic pressure or  atrial contractile dysfunction. Features were consistent with a pseudonormal left ventricular filling pattern, with concomitant abnormal relaxation and increased filling pressure (grade 2 diastolic dysfunction).    VENTRICULAR SEPTUM: There was dyssynergic motion. These changes are consistent with right ventricular pacing.    RIGHT VENTRICLE: The size was normal. Systolic function was normal. A pacing wire was present.    LEFT ATRIUM: Size was at the upper limits of normal.    RIGHT ATRIUM: Size was normal. A pacing wire was present.    MITRAL VALVE: Valve structure was normal. There  was normal leaflet separation. DOPPLER: The transmitral velocity was within the normal range. There was no evidence for stenosis. There was no significant regurgitation.    AORTIC VALVE: The valve was trileaflet. Leaflets exhibited normal thickness and normal cuspal separation. DOPPLER: Transaortic velocity was minimally increased. There was no evidence for stenosis. There was no regurgitation.    TRICUSPID VALVE: The valve structure was normal. There was normal leaflet separation. DOPPLER: The transtricuspid velocity was within the normal range. There was no evidence for stenosis. There was trace regurgitation. The tricuspid jet  envelope definition was inadequate for estimation of RV systolic pressure.    PULMONIC VALVE: Leaflets exhibited normal thickness, no calcification, and normal cuspal separation. DOPPLER: The transpulmonic velocity was within the normal range. There was no evidence for stenosis. There was no significant  regurgitation.    PERICARDIUM: There was no pericardial effusion. The pericardium was normal in appearance.    AORTA: The root exhibited normal size.    SYSTEMIC VEINS: HEPATIC VEINS: The hepatic veins were not well visualized.    MEASUREMENT TABLES    2D MEASUREMENTS  LVOT   (Reference normals)  Diam   21 mm   (--)    DOPPLER MEASUREMENTS  LVOT   (Reference normals)  Peak richie   136 cm/s   (--)  Mean richie   82 cm/s   (--)  VTI   29.4 cm   (--)  Peak gradient   7 mmHg   (--)  Mean gradient   3.4 mmHg   (--)  Stroke vol   101.83 ml   (--)  Stroke index   0.61 ml/mï¾²   (--)  Aortic valve   (Reference normals)  Peak richie   196 cm/s   (--)  Mean richie   136 cm/s   (--)  VTI   41.3 cm   (--)  Peak gradient   15.3 mmHg   (--)  Mean gradient   8.2 mmHg   (--)  Obstr index, VTI   0.71    (--)  Valve area, VTI   2.46 cmï¾²   (--)  Area index, VTI   1.12 cmï¾²/mï¾²   (--)  Obstr index, Vmax   0.69    (--)  Valve area, Vmax   2.39 cmï¾²   (--)  Area index, Vmax   1.09 cmï¾²/mï¾²   (--)  Obstr index,  Vmean   0.6    (--)  Valve area, Vmean   2.08 cmï¾²   (--)  Area index, Vmean   0.95 cmï¾²/mï¾²   (--)    SYSTEM MEASUREMENT TABLES    2D  %FS: 36.3 %  Ao Diam: 2.88 cm  EDV(Teich): 56.76 ml  EF(Teich): 66.89 %  ESV(Teich): 18.79 ml  IVSd: 1.08 cm  LA Area: 18.04 cm2  LA Diam: 3.17 cm  LVEDV MOD A4C: 81.37 ml  LVEF MOD A4C: 53.88 %  LVESV MOD A4C: 37.53 ml  LVIDd: 3.66 cm  LVIDs: 2.33 cm  LVLd A4C: 8.48 cm  LVLs A4C: 7.4 cm  LVOT Diam: 2.12 cm  LVPWd: 1.03 cm  RA Area: 14.58 cm2  RVIDd: 3.11 cm  SV MOD A4C: 43.85 ml  SV(Teich): 37.97 ml    CW  AV Env.Ti: 304.5 ms  AV VTI: 41.27 cm  AV Vmax: 1.96 m/s  AV Vmean: 1.36 m/s  AV maxPG: 15.32 mmHg  AV meanP.2 mmHg    MM  TAPSE: 2.34 cm    PW  JACKIE (VTI): 2.5 cm2  JACKIE Vmax: 2.44 cm2  LVOT Env.Ti: 359.86 ms  LVOT VTI: 29.37 cm  LVOT Vmax: 1.36 m/s  LVOT Vmean: 0.82 m/s  LVOT maxP.37 mmHg  LVOT meanPG: 3.38 mmHg  LVSV Dopp: 103.23 ml  MV A Anjel: 0.43 m/s  MV Dec [place]: 5.59 m/s2  MV DecT: 180.1 ms  MV E Anjel: 1.01 m/s  MV E/A Ratio: 2.35  MV PHT: 52.23 ms  MVA By PHT: 4.21 cm2    [employer] Accredited Echocardiography Laboratory    Prepared and electronically signed by    Wilfred Dougherty MD  Signed 10-James-2020 15:57:32      Code Status: Level 1 - Full Code    ** Please Note: Dictation voice to text software may have been used in the creation of this document. **

## 2025-02-21 NOTE — LETTER
[facility] CARDIAC CATH LAB  [address]  [place] [address]  Dept: [phone]    February 21, 2025     Patient: Carol Silva   YOB: 1960   Date of Visit: 2/21/2025       To Whom it May Concern:    Carol Silva is under my professional care. She was seen in the hospital from 2/21/2025 to 02/21/25. She should work from home the week of 2/23/2025.    If you have any questions or concerns, please don't hesitate to call [phone].         Sincerely,          Laura Wilkinson PA-C

## 2025-02-21 NOTE — ASSESSMENT & PLAN NOTE
- maintained on beta blocker therapy of metoprolol succinate 25mg daily and digoxin 125mcg daily  - has had atrial arrhythmias noted on device interrogation 4/2020, asymptomatic (concern for atrial tachycardia versus flutter)    Patient presents today to undergo EP study/SVT ablation.  If this is not inducible, will need to consider antiarrhythmic therapy.

## 2025-02-21 NOTE — Clinical Note
Site (pad location): anterior thigh. Laterality: bilateral. Grounding pad site assessment: skin integrity intact.

## 2025-02-21 NOTE — ASSESSMENT & PLAN NOTE
- implanted first due to syncope 5/1998  - atrial lead noise noted on last interrogation  - underwent generator change 2007 and 1/10/2020

## 2025-02-21 NOTE — ANESTHESIA PREPROCEDURE EVALUATION
"Procedure:  Cardiac eps/svt ablation (C[facility])    Relevant Problems   CARDIO   (+) Mixed hyperlipidemia   (+) Ocular migraine   (+) Paroxysmal SVT (supraventricular tachycardia) (HCC)   (+) Sinus bradycardia      ENDO   (+) Other specified hypothyroidism      /RENAL   (+) Stage 3 chronic kidney disease (HCC)      NEURO/PSYCH   (+) Chronic right shoulder pain   (+) Numbness and tingling of right thumb   (+) Ocular migraine      Did not take metoprolol (held since 4 day ago)  Took levothyroxine this morning  Pacemaker in place  \"MDT-DUAL CHAMBER PPM (AAI-DDD MODE) / NOT MRI CONDITIONAL  DEVICE INTERROGATED IN THE [facility] OFFICE. BATTERY VOLTAGE ADEQUATE (8.4 YRS). AP: 49.6%. : 1.5% (MVP-ON). ALL LEAD PARAMETERS WITHIN NORMAL LIMITS. 1 AT/AF EPISODE W/ EGM SHOWING PAT, DURATION 3 MINS 5 SECS. AF BURDEN: <0.1%. PT TAKES METOPROLOL SUCC, DIGOXIN, ASA 81MG. EF: 55% (ECHO 1/10/20). PT SCHEDULED FOR SVT ABL 2/21/25. NO PROGRAMMING CHANGES MADE TO DEVICE PARAMETERS. NORMAL DEVICE FUNCTION. CH\"      Physical Exam    Airway    Mallampati score: III  TM Distance: >3 FB  Neck ROM: full     Dental   No notable dental hx     Cardiovascular  Rhythm: regular, Rate: abnormal, Cardiovascular exam normal    Pulmonary  Pulmonary exam normal Breath sounds clear to auscultation    Other Findings  post-pubertal.      Anesthesia Plan  ASA Score- 3     Anesthesia Type- IV sedation with anesthesia with ASA Monitors.         Additional Monitors:     Airway Plan:            Plan Factors-Exercise tolerance (METS): >4 METS.    Chart reviewed.   Existing labs reviewed. Patient summary reviewed.          Obstructive sleep apnea risk education given perioperatively.        Induction- intravenous.    Postoperative Plan-     Perioperative Resuscitation Plan - Level 1 - Full Code.       Informed Consent- Anesthetic plan and risks discussed with patient and spouse.  I personally reviewed this patient with the CRNA. Discussed and agreed on the " Anesthesia Plan with the CRNA..      NPO Status:  Vitals Value Taken Time   Date of last liquid 02/21/25 02/21/25 0831   Time of last liquid 0715 02/21/25 0831   Date of last solid 02/20/25 02/21/25 0831   Time of last solid 1830 02/21/25 0831

## 2025-02-21 NOTE — DISCHARGE INSTR - AVS FIRST PAGE
MEDICATION INSTRUCTIONS/CHANGES:  Please stop taking digoxin.  Please continue metoprolol, aspirin, and other non-cardiac meds.  Please hold fish oil for 3 days.     RESTRICTIONS:   No heavy lifting (more than 5-10 pounds) or strenuous activity for one week.    No soaking in a bath tub/hot tub/swimming pool for one week or until groin heals. You may shower - please let soap and water run over the groins, no scrubbing, and pat the area dry. Please place band-aid on groins daily for up to five days, but you may remove sooner if no issues are noted.     If you notice ongoing bleeding, swelling, or firm lumps in groin near ablation incision, please contact Dr. Baker's office - [phone]. If you have any significant issues after hours or on the weekends, please call the on call cardiology number at [phone].    Recommendation is to work from home the week of 2/23/2025 given recuperation from cardiac procedure.

## 2025-02-24 NOTE — ANESTHESIA POSTPROCEDURE EVALUATION
Post-Op Assessment Note    CV Status:  Stable    Pain management: adequate       Mental Status:  Alert and awake   Hydration Status:  Euvolemic   PONV Controlled:  Controlled   Airway Patency:  Patent     Post Op Vitals Reviewed: Yes    No anethesia notable event occurred.    Staff: CRNA, Anesthesiologist           Last Filed PACU Vitals:  Vitals Value Taken Time   Temp 98.2 °F (36.8 °C) 02/21/25 1230   Pulse 59 02/21/25 1415   /64 02/21/25 1415   Resp 17 02/21/25 1230   SpO2 96 % 02/21/25 1230

## 2025-03-21 DIAGNOSIS — Z11.59 SCREENING FOR MEASLES: Primary | ICD-10-CM

## 2025-03-25 ENCOUNTER — APPOINTMENT (OUTPATIENT)
Dept: LAB | Facility: CLINIC | Age: 65
End: 2025-03-25
Payer: COMMERCIAL

## 2025-03-25 DIAGNOSIS — Z11.59 SCREENING FOR MEASLES: ICD-10-CM

## 2025-03-25 PROCEDURE — 86765 RUBEOLA ANTIBODY: CPT

## 2025-03-25 PROCEDURE — 36415 COLL VENOUS BLD VENIPUNCTURE: CPT

## 2025-03-26 ENCOUNTER — RESULTS FOLLOW-UP (OUTPATIENT)
Dept: FAMILY MEDICINE CLINIC | Facility: CLINIC | Age: 65
End: 2025-03-26

## 2025-03-26 LAB
MEV IGG SER QL IA: 218 AU/ML
MEV IGG SER QL IA: POSITIVE

## 2025-04-11 NOTE — PROGRESS NOTES
Electrophysiology Follow Up  [facility]  [facility] [address], [place] [address]    Name: Carol Silva  : 1960  MRN: [id number]    Assessment & Plan  Paroxysmal SVT (supraventricular tachycardia) (HCC)  Atrial arrhythmias noted on device interrogation 2020   Patient asymptomatic  Concern for possible AT versus atrial flutter  2025 - underwent EPS w/ successful AT ablation  Currently maintained on metoprolol succinate 25mg daily  Status post placement of Medtronic dual chamber pacemaker  First implanted due to syncope 1998  Underwent GEN change  and 1/10/2020  Has had some noted atrial lead noise on prior interrogations  Stage 3 chronic kidney disease, unspecified whether stage 3a or 3b CKD (HCC)  Lab Results   Component Value Date    EGFR 64 2025    EGFR 73 2025    EGFR 66 2024    CREATININE 0.95 2025    CREATININE 0.85 2025    CREATININE 0.93 2024   Renal function around baseline per latest lab evaluation  Continue PCP follow-up  Mixed hyperlipidemia  Not currently maintained on statin  Continue PCP follow-up       Discussion/Plan:    Patient recently underwent EPS w/ AT ablation on 25 by Dr. Baker    Since this procedure they have been maintained on Metoprolol as above    Since this procedure She reports she has been feeling improved and currently denies acute cardiac complaint. When prompted, she does note some ongoing fatigue associated when she takes her metoprolol, but post-ablation after the digoxin was discontinued she's been feeling much better.    She affirms the groin access sites have healed well    EKG in office today showing atrial paced rhythm w/ ventricular rate 65bpm    I interrogated her pacemaker in office today showing no recent significant SVT episodes post-ablation.     She is currently programmed MVP-R 60bpm when pre-ablation she was MVP-R 50bpm. She questions if this will  be a permanent change (I will discuss this more w/ Dr. Baker).    Will reduce her metoprolol to 12.5mg daily    To continue scheduled device monitoring moving forwards    Patient has been instructed to follow up in our EP office in 6 months or as needed. She will call our office with any questions or concerns in the meantime.    Rhythm History:   Atrial fibrillation:      Atrial flutter:      SVT:      VT/VF/PVC:     Device history:   Pacemaker:     Defibrillator:     BIV PPM:     BIV ICD:     ILR:    Interim History/HPI:   Interim history: Carol Silva is a 64 y.o. female with a PMH of bradycardia s/p Medtronic pacemaker, SVT s/p AT ablation, CKD, and HLD.    She presents today for routine outpatient follow up given a recent AT ablation on 2/21/25. Since this procedure She reports she has been feeling improved and currently denies acute cardiac complaint. When prompted, she does note some ongoing fatigue associated when she takes her metoprolol, but post-ablation after the digoxin was discontinued she's been feeling much better. She affirms the groin access sites have healed well.     EKG: atrial paced rhythm w/ ventricular rate 65bpm    Review of Systems   Constitutional:  Negative for appetite change, chills, fatigue, fever and unexpected weight change.   Respiratory:  Negative for chest tightness and shortness of breath.    Cardiovascular:  Negative for chest pain, palpitations and leg swelling.   Neurological:  Negative for dizziness, syncope, weakness and light-headedness.         OBJECTIVE:   Vitals:   LMP  (LMP Unknown)   There is no height or weight on file to calculate BMI.        Physical Exam:   Physical Exam  Constitutional:       General: She is not in acute distress.     Appearance: Normal appearance. She is obese. She is not ill-appearing.   HENT:      Head: Normocephalic and atraumatic.      Nose: Nose normal.   Eyes:      General:         Right eye: No discharge.         Left eye: No discharge.    Neck:      Vascular: No carotid bruit.   Cardiovascular:      Rate and Rhythm: Normal rate and regular rhythm.      Pulses: Normal pulses.      Heart sounds: Normal heart sounds.   Pulmonary:      Effort: Pulmonary effort is normal.      Breath sounds: Normal breath sounds.   Musculoskeletal:      Right lower leg: No edema.      Left lower leg: No edema.   Skin:     General: Skin is warm and dry.      Capillary Refill: Capillary refill takes less than 2 seconds.   Neurological:      Mental Status: She is alert.            Medications:      Current Outpatient Medications:     aspirin 81 MG tablet, Take 81 mg by mouth daily, Disp: , Rfl:     azelastine (ASTELIN) 0.1 % nasal spray, 2 sprays into each nostril daily Use in each nostril as directed, Disp: 1 mL, Rfl: 2    Cholecalciferol (Vitamin D3) 50 MCG (2000 UT) TABS, 2 tabs daily, Disp: 60 tablet, Rfl: 10    EPINEPHrine (EPIPEN) 0.3 mg/0.3 mL SOAJ, Inject 0.3 mL (0.3 mg total) into a muscle as needed for anaphylaxis (twin pack), Disp: 2 each, Rfl: 3    FLUTICASONE PROPIONATE, NASAL, NA, 2 sprays into each nostril as needed  , Disp: , Rfl:     levothyroxine 50 mcg tablet, Take 1 tablet (50 mcg total) by mouth daily, Disp: 90 tablet, Rfl: 3    loratadine (CLARITIN) 10 mg tablet, Take 10 mg by mouth daily, Disp: , Rfl:     metoprolol succinate (TOPROL-XL) 25 mg 24 hr tablet, Take 1 tablet (25 mg total) by mouth daily, Disp: 30 tablet, Rfl: 11    Omega-3 Fatty Acids (fish oil) 1,000 mg, Take 4 capsules (4,000 mg total) by mouth daily, Disp: 120 capsule, Rfl: 10    Red Yeast Rice 600 MG CAPS, Take 2 capsules (1,200 mg total) by mouth 2 (two) times a day, Disp: 120 capsule, Rfl: 10       Historical Information   Past Medical History:   Diagnosis Date    Abnormal blood chemistry     last assessed: 6/9/2014    Allergic     Allergic reaction     last assessed: 9/17/2012    Allergic rhinitis     last assessed: 6/9/2014    Arthralgia of left shoulder region     last assessed:  12/3/2013    Arthritis     Bradycardia     Disease of thyroid gland     Dysautonomia (HCC)     Elevated blood pressure reading without diagnosis of hypertension     last assessed: 2016    Endometriosis     Familial combined hyperlipidemia     last assessed: 2014    Female infertility     Fibroid     Functional murmur     description: soft systolic murmur - no valvular disease on 2D echo Last assessed: 2014    Heart murmur 2000    Hypertension     Obstruction of eustachian tube     unspecified laterality Last assessed: 3/17/2014    Polycystic ovary syndrome     PONV (postoperative nausea and vomiting)     Subclinical hypothyroidism 2019    Symptomatic bradycardia     Syncope     Wears glasses        Past Surgical History:   Procedure Laterality Date    APPENDECTOMY      CARDIAC CATHETERIZATION      Description: Normal EF, No obstructive coronary artery disease, systolic hypertension..done at [facility] Resolved: 1998    CARDIAC ELECTROPHYSIOLOGY PROCEDURE N/A 2025    Procedure: Cardiac eps/svt ablation;  Surgeon: Thanh Baker MD;  Location:  CARDIAC CATH LAB;  Service: Cardiology    CARDIAC PACEMAKER PLACEMENT Left      SECTION      COLONOSCOPY      DEBRIDEMENT TENNIS ELBOW      GANGLION CYST EXCISION Left     hand    HYSTERECTOMY      KNEE ARTHROSCOPY Right     CO SURGICAL ARTHROSCOPY SHOULDER W/ROTATOR CUFF RPR Left 10/03/2016    Procedure: ARTHROSCOPY SHOULDER, ROTATOR CUFF REPAIR,SUBACROMIAL DECOMPRESSION ; MICROFRACTURE MIKAELA GLENOID HEAD;  Surgeon: Evens Joseph MD;  Location: [facility];  Service: Orthopedics    TONSILLECTOMY      TUBAL LIGATION      UPPER GASTROINTESTINAL ENDOSCOPY      WISDOM TOOTH EXTRACTION         Social History     Substance and Sexual Activity   Alcohol Use Not Currently    Comment: at ocassions only     Social History     Substance and Sexual Activity   Drug Use No     Social History     Tobacco Use   Smoking Status Never     Passive exposure: Never   Smokeless Tobacco Never       Family History   Problem Relation Age of Onset    Breast cancer Mother 74    Ovarian cancer Mother 82    Multiple myeloma Mother     Cancer Mother         Breast, Ovarian    Liver cancer Father 73    Hypertension Father     Diabetes Father     Arthritis Father             Cancer Father         Liver cancer    No Known Problems Sister     No Known Problems Sister     No Known Problems Sister     No Known Problems Sister     No Known Problems Daughter     No Known Problems Daughter     Colon cancer Maternal Grandmother 72    Thyroid cancer Maternal Grandfather 78    No Known Problems Paternal Grandmother     No Known Problems Paternal Grandfather     No Known Problems Maternal Aunt     No Known Problems Paternal Aunt     Liver cancer Paternal Aunt 75    Allergies Family         bronchitis    Diabetes Family     Seizures Family     Heart disease Family     Hypertension Family     Skin cancer Family          Labs & Results:  Below is the patient's most recent value for Albumin, ALT, AST, BUN, Calcium, Chloride, Cholesterol, CO2, Creatinine, GFR, Glucose, HDL, Hematocrit, Hemoglobin, Hemoglobin A1C, LDL, Magnesium, Phosphorus, Platelets, Potassium, PSA, Sodium, Triglycerides, and WBC.   Lab Results   Component Value Date    ALT 18 2025    AST 18 2025    BUN 15 2025    CALCIUM 9.1 2025     2025    CHOL 204 2015    CO2 26 2025    CREATININE 0.95 2025    HDL 48 (L) 2024    HCT 39.6 2025    HGB 12.9 2025    HGBA1C 6.4 (H) 2025     2025    K 4.1 2025     2015    TRIG 98 2024    WBC 7.52 2025     Note: for a comprehensive list of the patient's lab results, access the Results Review activity.

## 2025-04-11 NOTE — ASSESSMENT & PLAN NOTE
Lab Results   Component Value Date    EGFR 64 02/21/2025    EGFR 73 02/14/2025    EGFR 66 07/24/2024    CREATININE 0.95 02/21/2025    CREATININE 0.85 02/14/2025    CREATININE 0.93 07/24/2024   Renal function around baseline per latest lab evaluation  Continue PCP follow-up

## 2025-04-11 NOTE — ASSESSMENT & PLAN NOTE
Birth Control Pills Counseling: Birth Control Pill Counseling: I discussed with the patient the potential side effects of OCPs including but not limited to increased risk of stroke, heart attack, thrombophlebitis, deep venous thrombosis, hepatic adenomas, breast changes, GI upset, headaches, and depression.  The patient verbalized understanding of the proper use and possible adverse effects of OCPs. All of the patient's questions and concerns were addressed. Not currently maintained on statin  Continue PCP follow-up   Topical Sulfur Applications Counseling: Topical Sulfur Counseling: Patient counseled that this medication may cause skin irritation or allergic reactions.  In the event of skin irritation, the patient was advised to reduce the amount of the drug applied or use it less frequently.   The patient verbalized understanding of the proper use and possible adverse effects of topical sulfur application.  All of the patient's questions and concerns were addressed. Tetracycline Counseling: Patient counseled regarding possible photosensitivity and increased risk for sunburn.  Patient instructed to avoid sunlight, if possible.  When exposed to sunlight, patients should wear protective clothing, sunglasses, and sunscreen.  The patient was instructed to call the office immediately if the following severe adverse effects occur:  hearing changes, easy bruising/bleeding, severe headache, or vision changes.  The patient verbalized understanding of the proper use and possible adverse effects of tetracycline.  All of the patient's questions and concerns were addressed. Patient understands to avoid pregnancy while on therapy due to potential birth defects. High Dose Vitamin A Pregnancy And Lactation Text: High dose vitamin A therapy is contraindicated during pregnancy and breast feeding. Topical Retinoid Pregnancy And Lactation Text: This medication is Pregnancy Category C. It is unknown if this medication is excreted in breast milk. Aklief counseling:  Patient advised to apply a pea-sized amount only at bedtime and wait 30 minutes after washing their face before applying.  If too drying, patient may add a non-comedogenic moisturizer.  The most commonly reported side effects including irritation, redness, scaling, dryness, stinging, burning, itching, and increased risk of sunburn.  The patient verbalized understanding of the proper use and possible adverse effects of retinoids.  All of the patient's questions and concerns were addressed. Doxycycline Counseling:  Patient counseled regarding possible photosensitivity and increased risk for sunburn.  Patient instructed to avoid sunlight, if possible.  When exposed to sunlight, patients should wear protective clothing, sunglasses, and sunscreen.  The patient was instructed to call the office immediately if the following severe adverse effects occur:  hearing changes, easy bruising/bleeding, severe headache, or vision changes.  The patient verbalized understanding of the proper use and possible adverse effects of doxycycline.  All of the patient's questions and concerns were addressed. Topical Clindamycin Counseling: Patient counseled that this medication may cause skin irritation or allergic reactions.  In the event of skin irritation, the patient was advised to reduce the amount of the drug applied or use it less frequently.   The patient verbalized understanding of the proper use and possible adverse effects of clindamycin.  All of the patient's questions and concerns were addressed. Erythromycin Counseling:  I discussed with the patient the risks of erythromycin including but not limited to GI upset, allergic reaction, drug rash, diarrhea, increase in liver enzymes, and yeast infections. Minocycline Pregnancy And Lactation Text: This medication is Pregnancy Category D and not consider safe during pregnancy. It is also excreted in breast milk. Azelaic Acid Counseling: Patient counseled that medicine may cause skin irritation and to avoid applying near the eyes.  In the event of skin irritation, the patient was advised to reduce the amount of the drug applied or use it less frequently.   The patient verbalized understanding of the proper use and possible adverse effects of azelaic acid.  All of the patient's questions and concerns were addressed. Use Enhanced Medication Counseling?: No Azithromycin Pregnancy And Lactation Text: This medication is considered safe during pregnancy and is also secreted in breast milk. Winlevi Pregnancy And Lactation Text: This medication is considered safe during pregnancy and breastfeeding. Isotretinoin Counseling: Patient should get monthly blood tests, not donate blood, not drive at night if vision affected, not share medication, and not undergo elective surgery for 6 months after tx completed. Side effects reviewed, pt to contact office should one occur. Topical Sulfur Applications Pregnancy And Lactation Text: This medication is Pregnancy Category C and has an unknown safety profile during pregnancy. It is unknown if this topical medication is excreted in breast milk. Spironolactone Pregnancy And Lactation Text: This medication can cause feminization of the male fetus and should be avoided during pregnancy. The active metabolite is also found in breast milk. High Dose Vitamin A Counseling: Side effects reviewed, pt to contact office should one occur. Benzoyl Peroxide Counseling: Patient counseled that medicine may cause skin irritation and bleach clothing.  In the event of skin irritation, the patient was advised to reduce the amount of the drug applied or use it less frequently.   The patient verbalized understanding of the proper use and possible adverse effects of benzoyl peroxide.  All of the patient's questions and concerns were addressed. Bactrim Pregnancy And Lactation Text: This medication is Pregnancy Category D and is known to cause fetal risk.  It is also excreted in breast milk. Topical Retinoid counseling:  Patient advised to apply a pea-sized amount only at bedtime and wait 30 minutes after washing their face before applying.  If too drying, patient may add a non-comedogenic moisturizer. The patient verbalized understanding of the proper use and possible adverse effects of retinoids.  All of the patient's questions and concerns were addressed. Dapsone Pregnancy And Lactation Text: This medication is Pregnancy Category C and is not considered safe during pregnancy or breast feeding. Birth Control Pills Pregnancy And Lactation Text: This medication should be avoided if pregnant and for the first 30 days post-partum. Topical Clindamycin Pregnancy And Lactation Text: This medication is Pregnancy Category B and is considered safe during pregnancy. It is unknown if it is excreted in breast milk. Doxycycline Pregnancy And Lactation Text: This medication is Pregnancy Category D and not consider safe during pregnancy. It is also excreted in breast milk but is considered safe for shorter treatment courses. Minocycline Counseling: Patient advised regarding possible photosensitivity and discoloration of the teeth, skin, lips, tongue and gums.  Patient instructed to avoid sunlight, if possible.  When exposed to sunlight, patients should wear protective clothing, sunglasses, and sunscreen.  The patient was instructed to call the office immediately if the following severe adverse effects occur:  hearing changes, easy bruising/bleeding, severe headache, or vision changes.  The patient verbalized understanding of the proper use and possible adverse effects of minocycline.  All of the patient's questions and concerns were addressed. Tazorac Counseling:  Patient advised that medication is irritating and drying.  Patient may need to apply sparingly and wash off after an hour before eventually leaving it on overnight.  The patient verbalized understanding of the proper use and possible adverse effects of tazorac.  All of the patient's questions and concerns were addressed. Sarecycline Counseling: Patient advised regarding possible photosensitivity and discoloration of the teeth, skin, lips, tongue and gums.  Patient instructed to avoid sunlight, if possible.  When exposed to sunlight, patients should wear protective clothing, sunglasses, and sunscreen.  The patient was instructed to call the office immediately if the following severe adverse effects occur:  hearing changes, easy bruising/bleeding, severe headache, or vision changes.  The patient verbalized understanding of the proper use and possible adverse effects of sarecycline.  All of the patient's questions and concerns were addressed. Aklief Pregnancy And Lactation Text: It is unknown if this medication is safe to use during pregnancy.  It is unknown if this medication is excreted in breast milk.  Breastfeeding women should use the topical cream on the smallest area of the skin for the shortest time needed while breastfeeding.  Do not apply to nipple and areola. Detail Level: Zone Azithromycin Counseling:  I discussed with the patient the risks of azithromycin including but not limited to GI upset, allergic reaction, drug rash, diarrhea, and yeast infections. Tazorac Pregnancy And Lactation Text: This medication is not safe during pregnancy. It is unknown if this medication is excreted in breast milk. Erythromycin Pregnancy And Lactation Text: This medication is Pregnancy Category B and is considered safe during pregnancy. It is also excreted in breast milk. Winlevi Counseling:  I discussed with the patient the risks of topical clascoterone including but not limited to erythema, scaling, itching, and stinging. Patient voiced their understanding. Spironolactone Counseling: Patient advised regarding risks of diarrhea, abdominal pain, hyperkalemia, birth defects (for female patients), liver toxicity and renal toxicity. The patient may need blood work to monitor liver and kidney function and potassium levels while on therapy. The patient verbalized understanding of the proper use and possible adverse effects of spironolactone.  All of the patient's questions and concerns were addressed. Azelaic Acid Pregnancy And Lactation Text: This medication is considered safe during pregnancy and breast feeding. Bactrim Counseling:  I discussed with the patient the risks of sulfa antibiotics including but not limited to GI upset, allergic reaction, drug rash, diarrhea, dizziness, photosensitivity, and yeast infections.  Rarely, more serious reactions can occur including but not limited to aplastic anemia, agranulocytosis, methemoglobinemia, blood dyscrasias, liver or kidney failure, lung infiltrates or desquamative/blistering drug rashes. Benzoyl Peroxide Pregnancy And Lactation Text: This medication is Pregnancy Category C. It is unknown if benzoyl peroxide is excreted in breast milk. Dapsone Counseling: I discussed with the patient the risks of dapsone including but not limited to hemolytic anemia, agranulocytosis, rashes, methemoglobinemia, kidney failure, peripheral neuropathy, headaches, GI upset, and liver toxicity.  Patients who start dapsone require monitoring including baseline LFTs and weekly CBCs for the first month, then every month thereafter.  The patient verbalized understanding of the proper use and possible adverse effects of dapsone.  All of the patient's questions and concerns were addressed. Isotretinoin Pregnancy And Lactation Text: This medication is Pregnancy Category X and is considered extremely dangerous during pregnancy. It is unknown if it is excreted in breast milk.

## 2025-04-11 NOTE — ASSESSMENT & PLAN NOTE
Atrial arrhythmias noted on device interrogation 4/2020   Patient asymptomatic  Concern for possible AT versus atrial flutter  2/21/2025 - underwent EPS w/ successful AT ablation  Currently maintained on metoprolol succinate 25mg daily

## 2025-04-11 NOTE — ASSESSMENT & PLAN NOTE
First implanted due to syncope 5/1998  Underwent GEN change 2007 and 1/10/2020  Has had some noted atrial lead noise on prior interrogations

## 2025-04-16 ENCOUNTER — OFFICE VISIT (OUTPATIENT)
Dept: CARDIOLOGY CLINIC | Facility: CLINIC | Age: 65
End: 2025-04-16

## 2025-04-16 VITALS
BODY MASS INDEX: 35.91 KG/M2 | HEART RATE: 65 BPM | SYSTOLIC BLOOD PRESSURE: 130 MMHG | DIASTOLIC BLOOD PRESSURE: 74 MMHG | HEIGHT: 67 IN | WEIGHT: 228.8 LBS

## 2025-04-16 DIAGNOSIS — N18.30 STAGE 3 CHRONIC KIDNEY DISEASE, UNSPECIFIED WHETHER STAGE 3A OR 3B CKD (HCC): ICD-10-CM

## 2025-04-16 DIAGNOSIS — I47.10 PAROXYSMAL SVT (SUPRAVENTRICULAR TACHYCARDIA) (HCC): Primary | ICD-10-CM

## 2025-04-16 DIAGNOSIS — I47.10 SVT (SUPRAVENTRICULAR TACHYCARDIA) (HCC): ICD-10-CM

## 2025-04-16 DIAGNOSIS — E78.2 MIXED HYPERLIPIDEMIA: ICD-10-CM

## 2025-04-16 DIAGNOSIS — Z95.0 STATUS POST PLACEMENT OF CARDIAC PACEMAKER: ICD-10-CM

## 2025-04-16 LAB
ATRIAL RATE: 65 BPM
P AXIS: 59 DEGREES
PR INTERVAL: 268 MS
QRS AXIS: 81 DEGREES
QRSD INTERVAL: 92 MS
QT INTERVAL: 416 MS
QTC INTERVAL: 433 MS
T WAVE AXIS: 56 DEGREES
VENTRICULAR RATE: 65 BPM

## 2025-04-16 RX ORDER — METOPROLOL SUCCINATE 25 MG/1
12.5 TABLET, EXTENDED RELEASE ORAL DAILY
Start: 2025-04-16

## 2025-04-16 NOTE — Clinical Note
Hello,  This patient has a MDT DC ppm w/ recent AT ablation by yourself in February.  Post-ablation her PPM has been programmed MVP-R at 60bpm.  Pre-ablation she was MVP-R at 50bpm.   Is this something we can turn back down? Or do you want to keep at the higher rate.  Otherwise during my visit today I decreased her metoprolol given some fatigue.  Thanks!

## 2025-04-16 NOTE — LETTER
April 16, 2025     Patient: Carol Silva  YOB: 1960  Date of Visit: 4/16/2025      To Whom it May Concern:    Carol Silva is under my professional care. Carol was seen in my office on 4/16/2025. Carol has no current cardiac restrictions that would prevent her from donating blood.    If you have any questions or concerns, please don't hesitate to call.         Sincerely,          Denis Alatorre PA-C        CC: No Recipients

## 2025-04-21 ENCOUNTER — TELEPHONE (OUTPATIENT)
Dept: CARDIOLOGY CLINIC | Facility: CLINIC | Age: 65
End: 2025-04-21

## 2025-05-05 ENCOUNTER — REMOTE DEVICE CLINIC VISIT (OUTPATIENT)
Dept: CARDIOLOGY CLINIC | Facility: CLINIC | Age: 65
End: 2025-05-05
Payer: COMMERCIAL

## 2025-05-05 ENCOUNTER — RESULTS FOLLOW-UP (OUTPATIENT)
Dept: CARDIOLOGY CLINIC | Facility: CLINIC | Age: 65
End: 2025-05-05

## 2025-05-05 ENCOUNTER — APPOINTMENT (OUTPATIENT)
Dept: LAB | Facility: MEDICAL CENTER | Age: 65
End: 2025-05-05

## 2025-05-05 DIAGNOSIS — Z95.0 PRESENCE OF CARDIAC PACEMAKER: Primary | ICD-10-CM

## 2025-05-05 DIAGNOSIS — Z00.8 HEALTH EXAMINATION IN POPULATION SURVEY: ICD-10-CM

## 2025-05-05 LAB
CHOLEST SERPL-MCNC: 143 MG/DL (ref ?–200)
EST. AVERAGE GLUCOSE BLD GHB EST-MCNC: 120 MG/DL
HBA1C MFR BLD: 5.8 %
HDLC SERPL-MCNC: 38 MG/DL
LDLC SERPL CALC-MCNC: 90 MG/DL (ref 0–100)
NONHDLC SERPL-MCNC: 105 MG/DL
TRIGL SERPL-MCNC: 73 MG/DL (ref ?–150)

## 2025-05-05 PROCEDURE — 93294 REM INTERROG EVL PM/LDLS PM: CPT | Performed by: INTERNAL MEDICINE

## 2025-05-05 PROCEDURE — 36415 COLL VENOUS BLD VENIPUNCTURE: CPT

## 2025-05-05 PROCEDURE — 83036 HEMOGLOBIN GLYCOSYLATED A1C: CPT

## 2025-05-05 PROCEDURE — 80061 LIPID PANEL: CPT

## 2025-05-05 NOTE — PROGRESS NOTES
Results for orders placed or performed in visit on 05/05/25   Cardiac EP device report    Narrative    MDT-DUAL CHAMBER PPM (AAI-DDD MODE) / NOT MRI CONDITIONAL  CARELINK TRANSMISSION: BATTERY VOLTAGE ADEQUATE (6.8 YR). AP 93.4%.  0.3%. 1 DEVICE CLASSIFIED EPISODE OF NSVT W/ EASY EGM SHOWNG SVT @ 176 BPM. 1 AT/AF EPISODE WITH EASY EGM SHOWING NOISE. PT TAKES METOPROLOL SUCCINATE; NO SVT SINCE ABLATION 2/21/25. NORMAL DEVICE FUNCTION. C

## 2025-06-10 ENCOUNTER — OFFICE VISIT (OUTPATIENT)
Dept: CARDIOLOGY CLINIC | Facility: CLINIC | Age: 65
End: 2025-06-10
Payer: COMMERCIAL

## 2025-06-10 VITALS — WEIGHT: 211.6 LBS | SYSTOLIC BLOOD PRESSURE: 130 MMHG | BODY MASS INDEX: 33.14 KG/M2 | DIASTOLIC BLOOD PRESSURE: 72 MMHG

## 2025-06-10 DIAGNOSIS — E78.2 MIXED HYPERLIPIDEMIA: ICD-10-CM

## 2025-06-10 DIAGNOSIS — I47.10 PAROXYSMAL SVT (SUPRAVENTRICULAR TACHYCARDIA) (HCC): ICD-10-CM

## 2025-06-10 DIAGNOSIS — Z95.0 STATUS POST PLACEMENT OF CARDIAC PACEMAKER: ICD-10-CM

## 2025-06-10 DIAGNOSIS — R00.1 SINUS BRADYCARDIA: Primary | ICD-10-CM

## 2025-06-10 PROCEDURE — 99214 OFFICE O/P EST MOD 30 MIN: CPT | Performed by: INTERNAL MEDICINE

## 2025-06-10 NOTE — PROGRESS NOTES
Cardiology Follow Up    Carol ROWLAND Shira  1960  [id number]  [facility] CARDIAC CATH LAB  [address] [facility] [address]  [phone]    1. Sinus bradycardia        2. Status post placement of Medtronic dual chamber pacemaker        3. Paroxysmal SVT (supraventricular tachycardia) (HCC)        4. Mixed hyperlipidemia            Interval History: She underwent atrial tachycardia ablation in February.  I have not seen her since 2019.  Today she feels well.  She tolerates all activity without symptoms.  She denies chest pain shortness of breath orthopnea paroxysmal nocturnal dyspnea palpitations or syncope.    Problem List[1]  Past Medical History[2]  Social History     Socioeconomic History    Marital status: /Civil Union     Spouse name: Not on file    Number of children: 3    Years of education: Not on file    Highest education level: Not on file   Occupational History    Not on file   Tobacco Use    Smoking status: Never     Passive exposure: Never    Smokeless tobacco: Never   Vaping Use    Vaping status: Never Used   Substance and Sexual Activity    Alcohol use: Yes     Comment: at ocassions only    Drug use: No    Sexual activity: Yes     Partners: Male     Birth control/protection: Female Sterilization   Other Topics Concern    Not on file   Social History Narrative    Living independently with spouse    Caffeine use      Social Drivers of Health     Financial Resource Strain: Not on file   Food Insecurity: Not on file   Transportation Needs: Not on file   Physical Activity: Not on file   Stress: Not on file   Social Connections: Not on file   Intimate Partner Violence: Not on file   Housing Stability: Not on file      Family History[3]  Past Surgical History[4]  Current Medications[5]  Allergies   Allergen Reactions    Shellfish Allergy - Food Allergy Anaphylaxis    Shellfish-Derived Products - Food Allergy Anaphylaxis     Shrimp Flavor - Food Allergy Anaphylaxis    Darvon [Propoxyphene] Hives    Tramadol Hives    Ultram [Tramadol Hcl] Hives       Labs:  Appointment on 05/05/2025   Component Date Value    Cholesterol 05/05/2025 143     Triglycerides 05/05/2025 73     HDL, Direct 05/05/2025 38 (L)     LDL Calculated 05/05/2025 90     Non-HDL-Chol (CHOL-HDL) 05/05/2025 105     Hemoglobin A1C 05/05/2025 5.8 (H)     EAG 05/05/2025 120    Office Visit on 04/16/2025   Component Date Value    Ventricular Rate 04/16/2025 65     Atrial Rate 04/16/2025 65     SD Interval 04/16/2025 268     QRSD Interval 04/16/2025 92     QT Interval 04/16/2025 416     QTC Interval 04/16/2025 433     P Axis 04/16/2025 59     QRS [place] 04/16/2025 81     T Wave [place] 04/16/2025 56    Appointment on 03/25/2025   Component Date Value    Rubeola IgG 03/25/2025 Positive     Rubeola IgG Index 03/25/2025 218.00    Admission on 02/21/2025, Discharged on 02/21/2025   Component Date Value    Sodium 02/21/2025 138     Potassium 02/21/2025 4.1     Chloride 02/21/2025 104     CO2 02/21/2025 26     ANION GAP 02/21/2025 8     BUN 02/21/2025 15     Creatinine 02/21/2025 0.95     Glucose 02/21/2025 108     Glucose, Fasting 02/21/2025 108 (H)     Calcium 02/21/2025 9.1     eGFR 02/21/2025 64     WBC 02/21/2025 7.52     RBC 02/21/2025 4.53     Hemoglobin 02/21/2025 12.9     Hematocrit 02/21/2025 39.6     MCV 02/21/2025 87     MCH 02/21/2025 28.5     MCHC 02/21/2025 32.6     RDW 02/21/2025 12.8     MPV 02/21/2025 9.5     Platelets 02/21/2025 331     Protime 02/21/2025 14.4     INR 02/21/2025 1.09     Ventricular Rate 02/21/2025 62     Atrial Rate 02/21/2025 62     SD Interval 02/21/2025 192     QRSD Interval 02/21/2025 88     QT Interval 02/21/2025 462     QTC Interval 02/21/2025 470     P Axis 02/21/2025 4     QRS [place] 02/21/2025 58     T Wave [place] 02/21/2025 24     Ventricular Rate 02/21/2025 63     Atrial Rate 02/21/2025 63     SD Interval 02/21/2025 278     QRSD Interval  02/21/2025 92     QT Interval 02/21/2025 450     QTC Interval 02/21/2025 461     P Axis 02/21/2025 51     QRS Axis 02/21/2025 50     T Wave [place] 02/21/2025 35     Segmented % 02/21/2025 58     Lymphocytes % 02/21/2025 24     Monocytes % 02/21/2025 9     Eosinophils % 02/21/2025 4     Basophils % 02/21/2025 0     Atypical Lymphocytes % 02/21/2025 5 (H)     Absolute Neutrophils 02/21/2025 4.36     Absolute Lymphocytes 02/21/2025 2.18     Absolute Monocytes 02/21/2025 0.68     Absolute Eosinophils 02/21/2025 0.30     Absolute Basophils 02/21/2025 0.00     RBC Morphology 02/21/2025 Present     Platelet Estimate 02/21/2025 Adequate     Giant PLTs 02/21/2025 Present     Ovalocytes 02/21/2025 Present     Poikilocytes 02/21/2025 Present    Appointment on 02/14/2025   Component Date Value    Hemoglobin A1C 02/14/2025 6.4 (H)     EAG 02/14/2025 137     Vit D, 25-Hydroxy 02/14/2025 70.6    Telephone on 12/30/2024   Component Date Value    Protime 02/14/2025 13.1     INR 02/14/2025 0.96     WBC 02/14/2025 6.33     RBC 02/14/2025 4.17     Hemoglobin 02/14/2025 11.7     Hematocrit 02/14/2025 38.3     MCV 02/14/2025 92     MCH 02/14/2025 28.1     MCHC 02/14/2025 30.5 (L)     RDW 02/14/2025 13.0     MPV 02/14/2025 10.4     Platelets 02/14/2025 309     nRBC 02/14/2025 0     Segmented % 02/14/2025 58     Immature Grans % 02/14/2025 0     Lymphocytes % 02/14/2025 29     Monocytes % 02/14/2025 7     Eosinophils Relative 02/14/2025 5     Basophils Relative 02/14/2025 1     Absolute Neutrophils 02/14/2025 3.70     Absolute Immature Grans 02/14/2025 0.02     Absolute Lymphocytes 02/14/2025 1.81     Absolute Monocytes 02/14/2025 0.44     Eosinophils Absolute 02/14/2025 0.31     Basophils Absolute 02/14/2025 0.05     Sodium 02/14/2025 140     Potassium 02/14/2025 4.3     Chloride 02/14/2025 103     CO2 02/14/2025 27     ANION GAP 02/14/2025 10     BUN 02/14/2025 15     Creatinine 02/14/2025 0.85     Glucose, Fasting 02/14/2025 118 (H)      Calcium 02/14/2025 8.9     AST 02/14/2025 18     ALT 02/14/2025 18     Alkaline Phosphatase 02/14/2025 80     Total Protein 02/14/2025 6.7     Albumin 02/14/2025 3.8     Total Bilirubin 02/14/2025 0.43     eGFR 02/14/2025 73      Imaging: No results found.    Review of Systems:  Review of Systems   Constitutional:  Negative for appetite change, chills, fatigue, fever and unexpected weight change.   Respiratory:  Negative for chest tightness and shortness of breath.    Cardiovascular:  Negative for chest pain, palpitations and leg swelling.   Neurological:  Negative for dizziness, syncope, weakness and light-headedness.       Physical Exam:  Physical Exam  Constitutional:       General: She is not in acute distress.     Appearance: Normal appearance. She is obese. She is not ill-appearing.   HENT:      Head: Normocephalic and atraumatic.      Nose: Nose normal.     Eyes:      General:         Right eye: No discharge.         Left eye: No discharge.     Neck:      Vascular: No carotid bruit.     Cardiovascular:      Rate and Rhythm: Normal rate and regular rhythm.      Pulses: Normal pulses.      Heart sounds: Normal heart sounds.   Pulmonary:      Effort: Pulmonary effort is normal.      Breath sounds: Normal breath sounds.     Musculoskeletal:      Right lower leg: No edema.      Left lower leg: No edema.     Skin:     General: Skin is warm and dry.      Capillary Refill: Capillary refill takes less than 2 seconds.     Neurological:      Mental Status: She is alert.         Discussion/Summary: A longstanding history of symptomatic bradycardia for which she has had permanent pacemaker placement.  She is predominantly atrial paced.  She is asymptomatic and functional class I.  She will continue with regular pacemaker evaluation.  She has had a recent atrial tacycardia ablation in February.  She is doing well following this procedure.  Her blood pressure is well-controlled.  Her most recent fasting lipid profile showed  her LDL to be 90.  She is walking 6 miles daily.  I have made no changes today and I will see her again in 1 year.       [1]   Patient Active Problem List  Diagnosis    Mixed hyperlipidemia    Sinus bradycardia    Numbness and tingling of right thumb    Cervical radiculopathy    Abnormal blood sugar    Allergic rhinitis    Allergic reaction    Other specified hypothyroidism    Paroxysmal SVT (supraventricular tachycardia) (HCC)    Vitamin D deficiency    Hand pain, right    Herpes zoster without complication    Class 2 obesity due to excess calories without serious comorbidity with body mass index (BMI) of 37.0 to 37.9 in adult    Chronic right shoulder pain    Left hand pain    Stage 3 chronic kidney disease (HCC)    Ocular migraine    Family history of colonic polyps    Menopausal vaginal dryness    Status post placement of Medtronic dual chamber pacemaker   [2]   Past Medical History:  Diagnosis Date    Abnormal blood chemistry     last assessed: 6/9/2014    Allergic     Allergic reaction     last assessed: 9/17/2012    Allergic rhinitis     last assessed: 6/9/2014    Arthralgia of left shoulder region     last assessed: 12/3/2013    Arthritis     Bradycardia     Disease of thyroid gland     Dysautonomia (HCC)     Elevated blood pressure reading without diagnosis of hypertension     last assessed: 9/30/2016    Endometriosis     Familial combined hyperlipidemia     last assessed: 6/9/2014    Female infertility     Fibroid     Functional murmur     description: soft systolic murmur - no valvular disease on 2D echo Last assessed: 4/23/2014    Heart disease     Pacemaker    Heart murmur 2000    Hypertension     Obstruction of eustachian tube     unspecified laterality Last assessed: 3/17/2014    Polycystic ovary syndrome     PONV (postoperative nausea and vomiting)     Subclinical hypothyroidism 01/06/2019    Symptomatic bradycardia     Syncope     Varicella     Wears glasses    [3]   Family History  Problem Relation  Name Age of Onset    Breast cancer Mother melina 74    Ovarian cancer Mother melina 82    Multiple myeloma Mother melina     Cancer Mother melina         Breast, Ovarian    Liver cancer Father Fernando 73    Hypertension Father Fernando     Diabetes Father Fernando     Arthritis Father Fernando             Cancer Father Fernando         Liver cancer    Heart attack Father Fernando     Rheum arthritis Father Fernando     No Known Problems Sister jocelyn     No Known Problems Sister geo     No Known Problems Sister giselle     No Known Problems Sister      No Known Problems Daughter jimbo     No Known Problems Daughter danielle     Colon cancer Maternal Grandmother Reta 72    Thyroid cancer Maternal Grandfather Jaylen 78    Colon cancer Maternal Grandfather Jaylen     No Known Problems Paternal Grandmother      No Known Problems Paternal Grandfather      No Known Problems Maternal Aunt enrrique     No Known Problems Paternal Aunt harrison     Liver cancer Paternal Aunt des 75    Allergies Family          bronchitis    Diabetes Family      Seizures Family      Heart disease Family      Hypertension Family      Skin cancer Family      Heart attack Sister Lulu Jenkins    [4]   Past Surgical History:  Procedure Laterality Date    APPENDECTOMY      CARDIAC CATHETERIZATION      Description: Normal EF, No obstructive coronary artery disease, systolic hypertension..done at [facility] Resolved: 1998    CARDIAC ELECTROPHYSIOLOGY PROCEDURE N/A 2025    Procedure: Cardiac eps/svt ablation;  Surgeon: Thanh Baker MD;  Location: [facility];  Service: Cardiology    CARDIAC PACEMAKER PLACEMENT Left      SECTION      COLONOSCOPY      DEBRIDEMENT TENNIS ELBOW      ELBOW SURGERY      Right    GANGLION CYST EXCISION Left     hand    HYSTERECTOMY      KNEE ARTHROSCOPY Right     FL SURGICAL ARTHROSCOPY SHOULDER W/ROTATOR CUFF RPR Left 10/03/2016    Procedure: ARTHROSCOPY  SHOULDER, ROTATOR CUFF REPAIR,SUBACROMIAL DECOMPRESSION ; MICROFRACTURE MIKAELA GLENOID HEAD;  Surgeon: Evens Joseph MD;  Location: [facility];  Service: Orthopedics    SHOULDER SURGERY  2016    Left    TONSILLECTOMY      TUBAL LIGATION      UPPER GASTROINTESTINAL ENDOSCOPY      WISDOM TOOTH EXTRACTION     [5]   Current Outpatient Medications:     aspirin 81 MG tablet, Take 81 mg by mouth in the morning., Disp: , Rfl:     azelastine (ASTELIN) 0.1 % nasal spray, 2 sprays into each nostril daily Use in each nostril as directed, Disp: 1 mL, Rfl: 2    Cholecalciferol (Vitamin D3) 50 MCG (2000 UT) TABS, 2 tabs daily, Disp: 60 tablet, Rfl: 10    EPINEPHrine (EPIPEN) 0.3 mg/0.3 mL SOAJ, Inject 0.3 mL (0.3 mg total) into a muscle as needed for anaphylaxis (twin pack), Disp: 2 each, Rfl: 3    FLUTICASONE PROPIONATE, NASAL, NA, 2 sprays into each nostril as needed, Disp: , Rfl:     levothyroxine 50 mcg tablet, Take 1 tablet (50 mcg total) by mouth daily, Disp: 90 tablet, Rfl: 3    loratadine (CLARITIN) 10 mg tablet, Take 10 mg by mouth in the morning., Disp: , Rfl:     metoprolol succinate (TOPROL-XL) 25 mg 24 hr tablet, Take 0.5 tablets (12.5 mg total) by mouth daily, Disp: , Rfl:     Omega-3 Fatty Acids (fish oil) 1,000 mg, Take 4 capsules (4,000 mg total) by mouth daily, Disp: 120 capsule, Rfl: 10    Red Yeast Rice 600 MG CAPS, Take 2 capsules (1,200 mg total) by mouth 2 (two) times a day, Disp: 120 capsule, Rfl: 10

## 2025-07-20 ENCOUNTER — OFFICE VISIT (OUTPATIENT)
Dept: URGENT CARE | Facility: MEDICAL CENTER | Age: 65
End: 2025-07-20
Payer: COMMERCIAL

## 2025-07-20 VITALS
SYSTOLIC BLOOD PRESSURE: 128 MMHG | OXYGEN SATURATION: 99 % | DIASTOLIC BLOOD PRESSURE: 75 MMHG | HEART RATE: 77 BPM | TEMPERATURE: 98.2 F | RESPIRATION RATE: 18 BRPM

## 2025-07-20 DIAGNOSIS — H69.92 ACUTE DYSFUNCTION OF LEFT EUSTACHIAN TUBE: Primary | ICD-10-CM

## 2025-07-20 PROCEDURE — 99213 OFFICE O/P EST LOW 20 MIN: CPT | Performed by: PHYSICIAN ASSISTANT

## 2025-07-20 RX ORDER — PREDNISONE 10 MG/1
TABLET ORAL
Qty: 18 TABLET | Refills: 0 | Status: SHIPPED | OUTPATIENT
Start: 2025-07-20

## 2025-07-20 NOTE — PROGRESS NOTES
[facility]  Name: Carol Silva      : 1960      MRN: [id number]  Encounter Provider: Sidney Carias PA-C  Encounter Date: 2025   Encounter department: [facility] [place]  :  Assessment & Plan  Acute dysfunction of left eustachian tube    Orders:    predniSONE 10 mg tablet; 4 x 3 days, 3 x 1, 2 x 1, 1 x 1        Patient Instructions  Follow up with PCP in 3-5 days as needed.    If tests are performed, our office will contact you with results only if changes need to made to the care plan discussed with you at the visit. [facility]    Chief Complaint:   Chief Complaint   Patient presents with    Earache     Patient c/o left ear pain with pressure x 3-4 days      History of Present Illness   Patient complains of ear fullness and decreased hearing in the left ear.    Earache   There is pain in the left ear. This is a recurrent problem. The current episode started in the past 7 days. The problem occurs constantly. The problem has been gradually worsening. There has been no fever. The pain is at a severity of 3/10. Associated symptoms include hearing loss. Pertinent negatives include no abdominal pain, coughing, diarrhea, ear discharge, headaches, neck pain, rash, rhinorrhea, sore throat or vomiting. She has tried nothing for the symptoms. The treatment provided no relief.         Review of Systems   HENT:  Positive for ear pain and hearing loss. Negative for ear discharge, rhinorrhea and sore throat.    Respiratory:  Negative for cough.    Gastrointestinal:  Negative for abdominal pain, diarrhea and vomiting.   Musculoskeletal:  Negative for neck pain.   Skin:  Negative for rash.   Neurological:  Negative for headaches.   All other systems reviewed and are negative.    Past Medical History   Past Medical History[1]  Past Surgical History[2]  Family History[3]  she reports that she has never smoked. She has never been exposed to tobacco smoke.  "She has never used smokeless tobacco. She reports current alcohol use. She reports that she does not use drugs.  Current Outpatient Medications   Medication Instructions    aspirin 81 mg, Daily    azelastine (ASTELIN) 0.1 % nasal spray 2 sprays, Nasal, Daily, Use in each nostril as directed     Cholecalciferol (Vitamin D3) 50 MCG (2000 UT) TABS 2 tabs daily    EPINEPHrine (EPIPEN) 0.3 mg, Intramuscular, As needed, (twin pack)    fish oil 4,000 mg, Oral, Daily    FLUTICASONE PROPIONATE, NASAL, NA 2 sprays, As needed    levothyroxine 50 mcg, Oral, Daily    loratadine (CLARITIN) 10 mg, Daily    metoprolol succinate (TOPROL-XL) 12.5 mg, Oral, Daily    predniSONE 10 mg tablet 4 x 3 days, 3 x 1, 2 x 1, 1 x 1    Red Yeast Rice 1,200 mg, Oral, 2 times daily   Allergies[4]     Objective   /75   Pulse 77   Temp 98.2 °F (36.8 °C)   Resp 18   LMP  (LMP Unknown)   SpO2 99%      Physical Exam  Vitals and nursing note reviewed.   Constitutional:       Appearance: Normal appearance. She is normal weight.   HENT:      Right Ear: Tympanic membrane, ear canal and external ear normal.      Left Ear: Ear canal and external ear normal.      Nose: Nose normal.      Mouth/Throat:      Mouth: Mucous membranes are moist.     Eyes:      Conjunctiva/sclera: Conjunctivae normal.       Cardiovascular:      Rate and Rhythm: Normal rate and regular rhythm.      Pulses: Normal pulses.      Heart sounds:      Friction rub present.   Pulmonary:      Effort: Pulmonary effort is normal.      Breath sounds: Normal breath sounds.   Lymphadenopathy:      Cervical: No cervical adenopathy.     Neurological:      Mental Status: She is alert.     Psychiatric:         Mood and Affect: Mood normal.         Behavior: Behavior normal.       Left TM bulging.  Portions of the record may have been created with voice recognition software.  Occasional wrong word or \"sound a like\" substitutions may have occurred due to the inherent limitations of voice " recognition software.  Read the chart carefully and recognize, using context, where substitutions have occurred.This encounter was created for [employer] orders only .          [1]   Past Medical History:  Diagnosis Date    Abnormal blood chemistry     last assessed: 2014    Allergic     Allergic reaction     last assessed: 2012    Allergic rhinitis     last assessed: 2014    Arthralgia of left shoulder region     last assessed: 12/3/2013    Arthritis     Bradycardia     Disease of thyroid gland     Dysautonomia (HCC)     Elevated blood pressure reading without diagnosis of hypertension     last assessed: 2016    Endometriosis     Familial combined hyperlipidemia     last assessed: 2014    Female infertility     Fibroid     Functional murmur     description: soft systolic murmur - no valvular disease on 2D echo Last assessed: 2014    Heart disease     Pacemaker    Heart murmur     Hypertension     Obstruction of eustachian tube     unspecified laterality Last assessed: 3/17/2014    Polycystic ovary syndrome     PONV (postoperative nausea and vomiting)     Subclinical hypothyroidism 2019    Symptomatic bradycardia     Syncope     Varicella     Wears glasses    [2]   Past Surgical History:  Procedure Laterality Date    APPENDECTOMY      CARDIAC CATHETERIZATION      Description: Normal EF, No obstructive coronary artery disease, systolic hypertension..done at [facility] Resolved: 1998    CARDIAC ELECTROPHYSIOLOGY PROCEDURE N/A 2025    Procedure: Cardiac eps/svt ablation;  Surgeon: Thanh Baker MD;  Location: [facility];  Service: Cardiology    CARDIAC PACEMAKER PLACEMENT Left      SECTION      COLONOSCOPY      DEBRIDEMENT TENNIS ELBOW      ELBOW SURGERY      Right    GANGLION CYST EXCISION Left     hand    HYSTERECTOMY      KNEE ARTHROSCOPY Right     VA SURGICAL ARTHROSCOPY SHOULDER W/ROTATOR CUFF RPR Left 10/03/2016    Procedure:  ARTHROSCOPY SHOULDER, ROTATOR CUFF REPAIR,SUBACROMIAL DECOMPRESSION ; MICROFRACTURE MIKAELA GLENOID HEAD;  Surgeon: Evens Joseph MD;  Location: [facility];  Service: Orthopedics    SHOULDER SURGERY  2016    Left    TONSILLECTOMY      TUBAL LIGATION      UPPER GASTROINTESTINAL ENDOSCOPY      WISDOM TOOTH EXTRACTION     [3]   Family History  Problem Relation Name Age of Onset    Breast cancer Mother melina 74    Ovarian cancer Mother melina 82    Multiple myeloma Mother melina     Cancer Mother melina         Breast, Ovarian    Liver cancer Father Fernando 73    Hypertension Father Fernando     Diabetes Father Fernando     Arthritis Father Fernando             Cancer Father Fernando         Liver cancer    Heart attack Father Fernando     Rheum arthritis Father Fernando     No Known Problems Sister jocelyn     No Known Problems Sister geo     No Known Problems Sister giselle     No Known Problems Sister      No Known Problems Daughter jimbo     No Known Problems Daughter danielle     Colon cancer Maternal Grandmother Reta 72    Thyroid cancer Maternal Grandfather Jaylen 78    Colon cancer Maternal Grandfather Jaylen     No Known Problems Paternal Grandmother      No Known Problems Paternal Grandfather      No Known Problems Maternal Aunt enrrique     No Known Problems Paternal Aunt harrison     Liver cancer Paternal Aunt des 75    Allergies Family          bronchitis    Diabetes Family      Seizures Family      Heart disease Family      Hypertension Family      Skin cancer Family      Heart attack Sister Lulu Alice    [4]   Allergies  Allergen Reactions    Shellfish Allergy - Food Allergy Anaphylaxis    Shellfish-Derived Products - Food Allergy Anaphylaxis    Shrimp Flavor - Food Allergy Anaphylaxis    Darvon [Propoxyphene] Hives    Tramadol Hives    Ultram [Tramadol Hcl] Hives

## 2025-08-04 ENCOUNTER — REMOTE DEVICE CLINIC VISIT (OUTPATIENT)
Dept: CARDIOLOGY CLINIC | Facility: CLINIC | Age: 65
End: 2025-08-04
Payer: COMMERCIAL

## 2025-08-04 DIAGNOSIS — Z95.0 CARDIAC PACEMAKER IN SITU: Primary | ICD-10-CM

## 2025-08-04 PROCEDURE — 93294 REM INTERROG EVL PM/LDLS PM: CPT | Performed by: INTERNAL MEDICINE

## 2025-08-04 PROCEDURE — 93296 REM INTERROG EVL PM/IDS: CPT | Performed by: INTERNAL MEDICINE

## (undated) DEVICE — Device: Brand: REFERENCE PATCH CARTO 3

## (undated) DEVICE — PINNACLE INTRODUCER SHEATH: Brand: PINNACLE

## (undated) DEVICE — Device: Brand: THERMOCOOL SMARTTOUCH SF

## (undated) DEVICE — CATH EP 5FR QUAD SUPREME JSN

## (undated) DEVICE — Device: Brand: WEBSTER CS

## (undated) DEVICE — Device: Brand: PENTARAY NAV

## (undated) DEVICE — GUIDE SHEATH AGILIS TRANSSEPTAL 8.5FR 71CM MED CURL

## (undated) DEVICE — Device: Brand: SMARTABLATE